# Patient Record
Sex: MALE | Race: WHITE | Employment: OTHER | URBAN - METROPOLITAN AREA
[De-identification: names, ages, dates, MRNs, and addresses within clinical notes are randomized per-mention and may not be internally consistent; named-entity substitution may affect disease eponyms.]

---

## 2017-02-02 NOTE — PROGRESS NOTES
66 y.o. white male who presents for evaluation. He seems to be doing well. Still walking 2 miles no problems about 4-5x/week. Sees cardiology at [facility] yearly now    Reports no GI or  issues. Denies polyuria, polydipsia, nocturia, vision change. FBS in the sub 100 range and no hypo episodes    Past Medical History   Diagnosis Date    Atypical chest pain      2007, 2015    Carotid duplex 01/28/2011     No significant occlusive disease bilaterally.  Colon polyps 2015     Dr Geovani Stewart [facility]    Diabetes mellitus ([address] 1/15     on basis of hba1c    Dyslipidemia     Gilbert's syndrome     Gout 2002    History of echocardiogram 12/23/2013     EF 55-60%. No WMA. BRANDIE. Mild MR. Mild TR. No significant valvular heart disease.  Hypovitaminosis D 2012     Nephrolithiasis      ureteral stone 2004 Dr. Rosalie Cogan, lithotripsy Bettina Gray 2005    Normal nuclear cardiac imaging test 12/23/2013     No evidence of ischemia or prior infarction. EF 63%. No RWMA. Neg EKG on max EST. Ex time 10 min 48 sec.  Osteoarthritis     Peripheral neuropathy Dr. Loretta Dandy, Dr. Rey Greenberg 10/11    Prediabetes     S/P cardiac cath 7/31/14     rca diffuse<20%, distal left main<20%, diffuse 20-30% LAD, no instent restenosis, lvedp 10, ef 55%, minimal anteroapical hypokinesis    S/P cardiac catheterization 07/31/2014     Patent coronary arteries. Prior pLAD stent patent. LVEDP 10 mmHg. EF 55%. Minimal anteroapical hypk.  Venous insufficiency s/p laser vein ablation Dr. Coughlin Her 7/12      left leg     Past Surgical History   Procedure Laterality Date    Hx appendectomy      Hx other surgical       surgery for undescended testicles    Hx lithotripsy  07/05    Hx coronary stent placement       severe 95% LAD disease stented to residual 0%.     Hx gi  1/08     US abd negative    Hx orthopaedic  2007     let meniscus tear Dr. Amara Henry Vascular surgery procedure unlist  2006     negative community screening for PAD/AAA   [employer] Vascular surgery procedure unlist  2008     <50% B ICA    Hx colonoscopy       Dr Moura Older 1/05 negative; Dr Karl Ayoub polyps 7/15    Pr cardiac surg procedure unlist  12/13     thallium negative, ef 63%    Pr cardiac surg procedure unlist  12/13     echo nl lv, ef 60%, mild MR, mild TR     Social History     Social History    Marital status:      Spouse name: N/A    Number of children: 2    Years of education: N/A     Occupational History     Retired     Social History Main Topics    Smoking status: Never Smoker    Smokeless tobacco: Never Used    Alcohol use Yes      Comment: rarely    Drug use: No    Sexual activity: Yes     Partners: Female      Comment: Wife     Other Topics Concern    Not on file     Social History Narrative     Current Outpatient Prescriptions   Medication Sig    metoprolol succinate (TOPROL XL) 25 mg XL tablet Take 1 Tab by mouth daily.  atorvastatin (LIPITOR) 80 mg tablet Take 1 Tab by mouth daily.  metFORMIN ER (GLUCOPHAGE XR) 500 mg tablet Take 1 Tab by mouth daily (with dinner).  clopidogrel (PLAVIX) 75 mg tablet Take 1 Tab by mouth daily.  ezetimibe (ZETIA) 10 mg tablet Take 1 Tab by mouth daily.  glucose blood VI test strips (PRECISION XTRA TEST) strip Use daily as directed    Lancets misc Use daily as directed    Cholecalciferol, Vitamin D3, 5,000 unit Tab Take  by mouth daily.  Comp. Stocking,Thigh,Long,X-Sml Misc 1 Package by Does Not Apply route daily. 30-40 mm/hg    GLUCOSAMINE HCL/CHONDRO PINEDA A (GLUCOSAMINE-CHONDROITIN PO) Take  by mouth daily.  UBIDECARENONE (COENZYME Q10 PO) Take  by mouth daily.  aspirin 81 mg tablet Take 81 mg by mouth daily. No current facility-administered medications for this visit.       LAST MEDICARE WELLNESS EXAM: 3/28/14, 4/10/15, 4/13/16    REVIEW OF SYSTEMS: sees Dr. Eber Tai 2015 Dr Cyndee Baez at [facility], no podiatry  Ophtho - no vision change or eye pain  Oral - no mouth pain, tongue or tooth problems  Ears - no hearing loss, ear pain, fullness  Throat - no swallowing problems  Cardiac - no CP, PND, orthopnea, edema, palpitations or syncope  Chest - no breast masses  Resp - no wheezing, chronic coughing, dyspnea  GI - no heartburn, nausea, vomiting, change in bowel habits, bleeding, hemorrhoids  Urinary - no dysuria, hematuria, flank pain, urgency, frequency  Constitutional - no wt loss, night sweats, unexplained fevers    Visit Vitals    /78    Pulse 60    Temp 97.9 °F (36.6 °C) (Oral)    Ht 5' 11\" (1.803 m)    Wt 163 lb (73.9 kg)    SpO2 100%    BMI 22.73 kg/m2     A&O x3  Affect is appropriate. Mood stable  No apparent distress  Anicteric, no JVD, adenopathy or thyromegaly. No carotid bruits or radiated murmur  Lungs clear to auscultation, no wheezes or rales  Heart showed regular rate and rhythm. No murmur, rubs, gallops  Abdomen soft nontender, no hepatosplenomegaly or masses.    Ext without c/c/e    LABS  From 1/11 showed    gluc 104, cr 0.90,             alt  38,                                   chol 173, tg 82, hdl 49, ldl-c 108, psa 0.90  From 7/11 showed                                 ck 48, javed 7.7  From 2/12 showed    gluc 108                               mma 116,    b12 409, fol 13.7, homo 8.9, nl esr, shannan neg, nl tsh, 2 hr gtt 98   From 4/12 showed        hba1c 6.2,                 chol 155, tg 93,   hdl 68, ldl-c 68  From 10/12 showed  gluc 98,   cr 0.80,             alt 29, hba1c 6.4  From 3/13 showed    gluc 89,   cr 0.80, gfr 91,  alt 14,                   ldl-p 867, chol 160, tg 50,   hdl 75, ldl-c 75,   psa 0.80  From 8/13 showed                                chol 167, tg 57,   hdl 79, ldl-c 77,  wbc 3.7, hb 13.1, plt 154, vit d 53  From 3/14 showed    gluc 103, cr 0.80, gfr 91,  alt 47, hba1c 6.2,               chol 164, tg 74,   hdl 74, ldl-c 72,  wbc 8.6, hb 14.0, plt 186  From 7/14 showed    gluc 89,   cr 0.94, gfr>60,     hba1c 6.4,               chol 157, tg 87,   hdl 74, ldl-c 67,  wbc 4.3, hb 13.4, plt 168, ck/trop neg  From 9/14 showed         hba1c 6.2,               psa 0.76,             ua neg  From 3/15 showed    gluc 105, cr 0.80,      alt 34, hba1c 6.4,    chol 175, tg 76,   hdl 78, ldl-c 82,   wbc 3.9, hb 14.5, plt 176  From 4/15 showed                     wbc 2.4,               plt 95  From 6/15 showed                     wbc 3.8, hb 14.4, plt 123, fe 90, %sat 30, feritin 334, b12 365, fol 14.7   From 9/15 showed         hba1c 6.3,    chol 165, tg 91,   hdl 63, ldl-c 84  From 1/16 showed    gluc 114, cr 0.80, gfr 90,  alt 63, hba1c 6.5,    chol 166, tg 100, hdl 73, ldl-c 73,  wbc 3.9, hb 14.5, plt 165  From 4/16 showed         hba1c 6.5,    chol 164, tg 61,   hdl 75, ldl-c 77,      umar 4.0  From 1/17 showed    gluc 101, cr 0.79, gfr>60, alt 33, hba1c 6.5,     chol 155, tg 70,   hdl 75, ldl-c 66,      umar 6.0    Patient Active Problem List   Diagnosis Code    Hyperlipidemia E78.5    Peripheral neuropathy Dr. Katy De La Fuente, Dr. Olive Dunbar G62.9    Hypovitaminosis D 2012 E55.9    Chest pain, atypical R07.89    Arthritis, degenerative M19.90    Coronary artery disease involving native coronary artery without angina pectoris s/p LAD stent 2007 Dr Cierra Carrillo I25.10    Advance directive discussed with patient Z71.89    Diabetes mellitus type 2, controlled ([facility] [address] E11.9    Colon polyp Dr Shanika Grover 7/15 K63.5    Sinus bradycardia R00.1     Assessment and plan:  1. CHD. F/U Dr. Cierra Carrillo, continue current regimen  2. Nephrolithiasis. Tx prn, hydration  3. Neuropathy. F/U neurology as needed, stable  4. Hypovit D. Off supplements  5. DM. Continue current regimen. 6.  Dyslipidemia. Continue current regimen. RTC 8/17    Above conditions discussed at length and patient vocalized understanding.   All questions answered to patient satifaction

## 2017-02-06 ENCOUNTER — OFFICE VISIT (OUTPATIENT)
Dept: INTERNAL MEDICINE CLINIC | Age: 72
End: 2017-02-06

## 2017-02-06 ENCOUNTER — TELEPHONE (OUTPATIENT)
Dept: INTERNAL MEDICINE CLINIC | Age: 72
End: 2017-02-06

## 2017-02-06 VITALS
BODY MASS INDEX: 22.82 KG/M2 | HEART RATE: 60 BPM | SYSTOLIC BLOOD PRESSURE: 110 MMHG | HEIGHT: 71 IN | TEMPERATURE: 97.9 F | DIASTOLIC BLOOD PRESSURE: 78 MMHG | WEIGHT: 163 LBS | OXYGEN SATURATION: 100 %

## 2017-02-06 DIAGNOSIS — E78.5 HYPERLIPIDEMIA, UNSPECIFIED HYPERLIPIDEMIA TYPE: ICD-10-CM

## 2017-02-06 DIAGNOSIS — Z23 ENCOUNTER FOR IMMUNIZATION: ICD-10-CM

## 2017-02-06 DIAGNOSIS — E11.9 CONTROLLED TYPE 2 DIABETES MELLITUS WITHOUT COMPLICATION, WITHOUT LONG-TERM CURRENT USE OF INSULIN (HCC): Primary | ICD-10-CM

## 2017-02-06 DIAGNOSIS — I25.10 CORONARY ARTERY DISEASE INVOLVING NATIVE CORONARY ARTERY OF NATIVE HEART WITHOUT ANGINA PECTORIS: ICD-10-CM

## 2017-02-06 DIAGNOSIS — G62.9 PERIPHERAL POLYNEUROPATHY: ICD-10-CM

## 2017-02-06 NOTE — PROGRESS NOTES
Front staff - pls sched rpe with labs in Aug - print out lab slip    Jessie Ramirez -pls get records dr Madyson Anderson

## 2017-02-06 NOTE — PROGRESS NOTES
1. Have you been to the ER, urgent care clinic or hospitalized since your last visit? NO.     2. Have you seen or consulted any other health care providers outside of the [address] [place] [address] since your last visit (Include any pap smears or colon screening)? NO      Do you have an Advanced Directive? Yes    Would you like information on Advanced Directives?  NO

## 2017-02-06 NOTE — MR AVS SNAPSHOT
Visit Information Date & Time Provider Department Dept. Phone Encounter #  
 2/6/2017  9:30 AM Bhavin Herman MD Internist of [address] [place] [address] [phone] Your Appointments 4/4/2017  8:00 AM  
Follow Up with Sonu Uribe MD  
[facility] ([address]) Appt Note: 1 year with an ekg [place] [address] [address] [address] [phone] [address]  
  
    
 8/23/2017 10:30 AM  
PHYSICAL with Bhavin Herman MD  
Internist of [address]) Appt Note: rpe rd  
 [address] [facility], [address] [address] [address]  
  
   
 [address], [address] Upcoming Health Maintenance Date Due DTaP/Tdap/Td series (1 - Tdap) 1/2/2005 EYE EXAM RETINAL OR DILATED Q1 1/13/2012 Pneumococcal 65+ Low/Medium Risk (2 of 2 - PPSV23) 10/1/2016 FOOT EXAM Q1 1/18/2017 HEMOGLOBIN A1C Q6M 2/15/2017 LIPID PANEL Q1 4/5/2017 MICROALBUMIN Q1 4/11/2017 FOBT Q 1 YEAR AGE 50-75 4/11/2017 MEDICARE YEARLY EXAM 4/14/2017 GLAUCOMA SCREENING Q2Y 4/11/2018 Allergies as of 2/6/2017  Review Complete On: 8/22/2016 By: Bhavin Herman MD  
 No Known Allergies Current Immunizations  Reviewed on 4/14/2016 Name Date Influenza Vaccine 10/1/2015, 10/1/2014, 11/1/2013, 10/1/2012 Influenza Vaccine Whole 9/1/2010 Pneumococcal Conjugate (PCV-13) 10/1/2015 Pneumococcal Polysaccharide (PPSV-23)  Incomplete Pneumococcal Vaccine (Unspecified Type) 1/23/2008 TD Vaccine 1/1/2005 Zoster 1/1/2008 Not reviewed this visit You Were Diagnosed With   
  
 Codes Comments Encounter for immunization     ICD-10-CM: I63 ICD-9-CM: V03.89 Vitals BP Pulse Temp Height(growth percentile) Weight(growth percentile) SpO2 110/78 60 97.9 °F (36.6 °C) (Oral) 5' 11\" (1.803 m) 163 lb (73.9 kg) 100% BMI Smoking Status 22.73 kg/m2 Never Smoker Vitals History BMI and BSA Data Body Mass Index Body Surface Area  
 22.73 kg/m 2 1.92 m 2 Preferred Pharmacy Pharmacy Name Phone [facility] [phone] Your Updated Medication List  
  
   
This list is accurate as of: 2/6/17 10:33 AM.  Always use your most recent med list.  
  
  
  
  
 aspirin 81 mg tablet Take 81 mg by mouth daily. atorvastatin 80 mg tablet Commonly known as:  LIPITOR Take 1 Tab by mouth daily. cholecalciferol (VITAMIN D3) 5,000 unit Tab tablet Commonly known as:  VITAMIN D3 Take  by mouth daily. clopidogrel 75 mg Tab Commonly known as:  PLAVIX Take 1 Tab by mouth daily. COENZYME Q10 PO Take  by mouth daily. Comp. Stocking,Thigh,Long,X-Sml Misc  
1 Package by Does Not Apply route daily. 30-40 mm/hg  
  
 ezetimibe 10 mg tablet Commonly known as:  Valdez Duane Take 1 Tab by mouth daily. GLUCOSAMINE-CHONDROITIN PO Take  by mouth daily. glucose blood VI test strips strip Commonly known as:  PRECISION XTRA TEST Use daily as directed Lancets Misc Use daily as directed  
  
 metFORMIN  mg tablet Commonly known as:  GLUCOPHAGE XR Take 1 Tab by mouth daily (with dinner). metoprolol succinate 25 mg XL tablet Commonly known as:  TOPROL XL Take 1 Tab by mouth daily. We Performed the Following ADMIN PNEUMOCOCCAL VACCINE [ [facility]] PNEUMOCOCCAL POLYSACCHARIDE VACCINE, 23-VALENT, ADULT OR IMMUNOSUPPRESSED PT DOSE, [25392 CPT(R)] Introducing [facility]! Dear Lucía Amin: Thank you for requesting a Watchsend account. Our records indicate that you already have an active Watchsend account. You can access your account anytime at [email or web address] Did you know that you can access your hospital and ER discharge instructions at any time in [employer]fe[employer]? You can also review all of your test results from your hospital stay or ER visit. Additional Information If you have questions, please visit the Frequently Asked Questions section of the Pro[employer] website at [email or web address] Remember, Profex is NOT to be used for urgent needs. For medical emergencies, dial 911. Now available from your iPhone and Android! Please provide this summary of care documentation to your next provider. Your primary care clinician is listed as Mya La. If you have any questions after today's visit, please call [phone].

## 2017-05-22 DIAGNOSIS — I65.23 CAROTID STENOSIS, BILATERAL: ICD-10-CM

## 2017-05-22 RX ORDER — CLOPIDOGREL BISULFATE 75 MG/1
75 TABLET ORAL DAILY
Qty: 90 TAB | Refills: 3 | Status: SHIPPED | OUTPATIENT
Start: 2017-05-22 | End: 2018-04-24 | Stop reason: SDUPTHER

## 2017-05-22 RX ORDER — EZETIMIBE 10 MG/1
10 TABLET ORAL DAILY
Qty: 90 TAB | Refills: 3 | Status: SHIPPED | OUTPATIENT
Start: 2017-05-22 | End: 2018-04-24 | Stop reason: SDUPTHER

## 2017-05-23 ENCOUNTER — OFFICE VISIT (OUTPATIENT)
Dept: CARDIOLOGY CLINIC | Age: 72
End: 2017-05-23

## 2017-05-23 VITALS
HEART RATE: 54 BPM | BODY MASS INDEX: 23.24 KG/M2 | WEIGHT: 166 LBS | DIASTOLIC BLOOD PRESSURE: 64 MMHG | HEIGHT: 71 IN | SYSTOLIC BLOOD PRESSURE: 106 MMHG | OXYGEN SATURATION: 98 %

## 2017-05-23 DIAGNOSIS — I25.10 CORONARY ARTERY DISEASE INVOLVING NATIVE CORONARY ARTERY OF NATIVE HEART WITHOUT ANGINA PECTORIS: Primary | ICD-10-CM

## 2017-05-23 DIAGNOSIS — E11.9 CONTROLLED TYPE 2 DIABETES MELLITUS WITHOUT COMPLICATION, WITHOUT LONG-TERM CURRENT USE OF INSULIN (HCC): ICD-10-CM

## 2017-05-23 DIAGNOSIS — E78.5 HYPERLIPIDEMIA, UNSPECIFIED HYPERLIPIDEMIA TYPE: ICD-10-CM

## 2017-05-23 DIAGNOSIS — R00.1 SINUS BRADYCARDIA: ICD-10-CM

## 2017-05-23 NOTE — PROGRESS NOTES
1. Have you been to the ER, urgent care clinic since your last visit? Hospitalized since your last visit? No     2. Have you seen or consulted any other health care providers outside of the [address] [place] [address] since your last visit? Include any pap smears or colon screening.  No

## 2017-05-23 NOTE — PROGRESS NOTES
HISTORY OF PRESENT ILLNESS  Mihir Hand is a 67 y.o. male. ASSESSMENT and PLAN    Mr. Leanne Waggoner has history of CAD. He has never had chest pains or angina equivalent. Back in 4670, he had 64 slice CT scan for unclear reasons. This reveals significant calcification. He subsequently underwent evaluation for CAD despite the fact that he had no symptoms. His evaluation revealed severe LAD disease. He subtotally had LAD stent performed in 2007 and has done fairly well since that time. He remains active physically. Because of recurrent episodes of chest pain, he was read[facility] to [facility]. This was in July of 2014 and he subsequent underwent repeat coronary angiography which revealed patent LAD stent, without significant, occlusive CAD. Reassurance was given. He presented to the hospital in [place] with abdominal discomfort. He was concerned about possible coronary syndrome. This was in early part of May 2017. He ruled out. He had a stress echocardiogram which was reported to him as normal.     CAD:   Remains clinically stable. He has not had any symptoms of chest pain or increased shortness of breath.  BP:   Well-controlled.  HR:    He has asymptomatic sinus bradycardia on low-dose Toprol-XL, 25 mg daily. If his heart rate slows further, or he becomes symptomatic, I would discontinue beta-blocker.  CHF:   Currently, there is no evidence of decompensated CHF noted.  Weight:   His weight today is 166 pounds. This is near his baseline.  Cholesterol:   Target LDL <70. He remains on Lipitor 80 mg daily and Zetia 10 mg daily.  Anti-platelet:   Remains on ASA, and Plavix. I would continue his current medication regimen. I would not pursue further coronary risk stratification at this time with his recent unremarkable stress echocardiogram.  I will plan on seeing him back annually. Thank you. Encounter Diagnoses   Name Primary?     Coronary artery disease involving native coronary artery of native heart without angina pectoris Yes    Sinus bradycardia     Hyperlipidemia, unspecified hyperlipidemia type     Controlled type 2 diabetes mellitus without complication, without long-term current use of insulin (HCC)      current treatment plan is effective, no change in therapy  lab results and schedule of future lab studies reviewed with patient  reviewed diet, exercise and weight control  cardiovascular risk and specific lipid/LDL goals reviewed  use of aspirin to prevent MI and TIA's discussed      HPI   Today, Mr. Candy Pineda has no complaints of chest pains, increased shortness of breath or decreased exercise capacity. However, last Saturday, he was in [place] where he experienced some chest discomfort as well as abdominal discomfort. He was ruled out at the hospital done in [place] and subsequently underwent stress echocardiogram.  This was reported to him as unremarkable. That information is being requested. Review of Systems   Respiratory: Negative for shortness of breath. Cardiovascular: Positive for chest pain. Negative for palpitations, orthopnea, claudication, leg swelling and PND. All other systems reviewed and are negative. Physical Exam   Constitutional: He is oriented to person, place, and time. He appears well-developed and well-nourished. HENT:   Head: Normocephalic. Eyes: Conjunctivae are normal.   Neck: Neck supple. No JVD present. Carotid bruit is not present. No thyromegaly present. Cardiovascular: Regular rhythm. Bradycardia present. Pulmonary/Chest: Breath sounds normal.   Abdominal: Bowel sounds are normal.   Musculoskeletal: He exhibits no edema. Neurological: He is alert and oriented to person, place, and time. Skin: Skin is warm and dry. Nursing note and vitals reviewed.       PCP: Karin Alpers, MD    Past Medical History:   Diagnosis Date    Atypical chest pain     2007, 2015    Cardiac catheterization 07/31/2014    LAD diffuse 20-30%. Prior pLAD stent patent. Otherwise < 20% coronary artery disease. LVEDP 10 mmHg. EF 55%. Minimal anteroapical hypk.  Cardiac echocardiogram 12/23/2013    EF 55-60%. No WMA. BRANDIE. Mild MR. Mild TR. No significant valvular heart disease.  Cardiac nuclear imaging test 12/23/2013    No evidence of ischemia or prior infarction. EF 63%. No RWMA. Neg EKG on max EST. Ex time 10 min 48 sec.  Carotid duplex 04/08/2016    Mild <50% bilateral ICA stenosis.  Colon polyps 2015    Dr Yaniv Mccray [facility]    Diabetes mellitus ([facility] [address] 1/15    on basis of hba1c    Dyslipidemia     Gilbert's syndrome     Gout 2002    Hypovitaminosis D 2012     Nephrolithiasis     ureteral stone 2004 Dr. Yvan Sheehan, lithotripsy Dalton Denis 2005    Osteoarthritis     Peripheral neuropathy Dr. Lele Villalobos, Dr. Micaela Fisher 10/11    Prediabetes     Venous insufficiency s/p laser vein ablation Dr. Archana Castro 7/12     left leg       Past Surgical History:   Procedure Laterality Date   [address]  12/13    thallium negative, ef 63%    CARDIAC SURG PROCEDURE UNLIST  12/13    echo nl lv, ef 60%, mild MR, mild TR    HX APPENDECTOMY      HX COLONOSCOPY      Dr Que Wolf 1/05 negative; Dr Jen Knutson polyps 7/15    HX CORONARY STENT PLACEMENT      severe 95% LAD disease stented to residual 0%.  HX GI  1/08    US abd negative    HX LITHOTRIPSY  07/05    HX ORTHOPAEDIC  2007    let meniscus tear Dr. Landry Peaks [address]      surgery for undescended testicles    VASCULAR SURGERY PROCEDURE UNLIST  2006    negative community screening for PAD/AAA    VASCULAR SURGERY PROCEDURE UNLIST  2008    <50% B ICA       Current Outpatient Prescriptions   Medication Sig Dispense Refill    ezetimibe (ZETIA) 10 mg tablet Take 1 Tab by mouth daily. 90 Tab 3    clopidogrel (PLAVIX) 75 mg tab Take 1 Tab by mouth daily. 90 Tab 3    metoprolol succinate (TOPROL XL) 25 mg XL tablet Take 1 Tab by mouth daily.  80 Tab 3    atorvastatin (LIPITOR) 80 mg tablet Take 1 Tab by mouth daily. 90 Tab 3    metFORMIN ER (GLUCOPHAGE XR) 500 mg tablet Take 1 Tab by mouth daily (with dinner). 90 Tab 3    glucose blood VI test strips (PRECISION XTRA TEST) strip Use daily as directed 100 Strip 11    Lancets misc Use daily as directed 1 Each 11    Cholecalciferol, Vitamin D3, 5,000 unit Tab Take  by mouth daily.  Comp. Stocking,Thigh,Long,X-Sml Misc 1 Package by Does Not Apply route daily. 30-40 mm/hg 1 Package 1    GLUCOSAMINE HCL/CHONDRO PINEDA A (GLUCOSAMINE-CHONDROITIN PO) Take  by mouth daily.  UBIDECARENONE (COENZYME Q10 PO) Take  by mouth daily.  aspirin 81 mg tablet Take 81 mg by mouth daily. The patient has a family history of    Social History   Substance Use Topics    Smoking status: Never Smoker    Smokeless tobacco: Never Used    Alcohol use Yes      Comment: rarely       Lab Results   Component Value Date/Time    Cholesterol, total 157 07/30/2014 04:14 AM    HDL Cholesterol 74 07/30/2014 04:14 AM    LDL, calculated 65.6 07/30/2014 04:14 AM    Triglyceride 87 07/30/2014 04:14 AM    CHOL/HDL Ratio 2.1 07/30/2014 04:14 AM        BP Readings from Last 3 Encounters:   05/23/17 106/64   02/06/17 110/78   08/22/16 98/60        Pulse Readings from Last 3 Encounters:   05/23/17 (!) 54   02/06/17 60   08/22/16 61       Wt Readings from Last 3 Encounters:   05/23/17 75.3 kg (166 lb)   02/06/17 73.9 kg (163 lb)   08/22/16 69.9 kg (154 lb)         EKG: unchanged from previous tracings, sinus bradycardia.

## 2017-05-23 NOTE — MR AVS SNAPSHOT
Visit Information Date & Time Provider Department Dept. Phone Encounter #  
 5/23/2017  8:40 AM Dilma Guajardo MD [facility] [id number] Your Appointments 8/23/2017 10:30 AM  
PHYSICAL with Rika Rice MD  
Internist of [facility]) Appt Note: [address]  
 [address], [address] [place] [address]  
  
   
 [address], [address] Upcoming Health Maintenance Date Due  
 FOOT EXAM Q1 1/18/2017 FOBT Q 1 YEAR AGE 50-75 4/11/2017 MEDICARE YEARLY EXAM 4/14/2017 HEMOGLOBIN A1C Q6M 7/27/2017 INFLUENZA AGE 9 TO ADULT 8/1/2017 MICROALBUMIN Q1 1/27/2018 LIPID PANEL Q1 1/27/2018 EYE EXAM RETINAL OR DILATED Q1 1/31/2018 GLAUCOMA SCREENING Q2Y 1/31/2019 DTaP/Tdap/Td series (2 - Td) 2/6/2027 Allergies as of 5/23/2017  Review Complete On: 2/6/2017 By: Rika Rice MD  
 No Known Allergies Current Immunizations  Reviewed on 4/14/2016 Name Date Influenza Vaccine 10/1/2015, 10/1/2014, 11/1/2013, 10/1/2012 Influenza Vaccine Whole 9/1/2010 Pneumococcal Conjugate (PCV-13) 10/1/2015 Pneumococcal Polysaccharide (PPSV-23) 2/6/2017 Pneumococcal Vaccine (Unspecified Type) 1/23/2008 TD Vaccine 1/1/2005 Zoster 1/1/2008 Not reviewed this visit You Were Diagnosed With   
  
 Codes Comments Sinus bradycardia    -  Primary ICD-10-CM: R00.1 ICD-9-CM: 427.89 Coronary artery disease involving native coronary artery of native heart without angina pectoris     ICD-10-CM: I25.10 ICD-9-CM: 414.01 Hyperlipidemia, unspecified hyperlipidemia type     ICD-10-CM: E78.5 ICD-9-CM: 272.4 Controlled type 2 diabetes mellitus without complication, without long-term current use of insulin ([facility]     ICD-10-CM: E11.9 ICD-9-CM: 250.00 Vitals BP Pulse Height(growth percentile) Weight(growth percentile) SpO2 BMI 106/64 (!) 54 5' 11\" (1.803 m) 166 lb (75.3 kg) 98% 23.15 kg/m2 Smoking Status Never Smoker Vitals History BMI and BSA Data Body Mass Index Body Surface Area  
 23.15 kg/m 2 1.94 m 2 Preferred Pharmacy Pharmacy Name Phone [facility] [phone] Your Updated Medication List  
  
   
This list is accurate as of: 5/23/17  9:16 AM.  Always use your most recent med list.  
  
  
  
  
 aspirin 81 mg tablet Take 81 mg by mouth daily. atorvastatin 80 mg tablet Commonly known as:  LIPITOR Take 1 Tab by mouth daily. cholecalciferol (VITAMIN D3) 5,000 unit Tab tablet Commonly known as:  VITAMIN D3 Take  by mouth daily. clopidogrel 75 mg Tab Commonly known as:  PLAVIX Take 1 Tab by mouth daily. COENZYME Q10 PO Take  by mouth daily. Comp. Stocking,Thigh,Long,X-Sml Misc  
1 Package by Does Not Apply route daily. 30-40 mm/hg  
  
 ezetimibe 10 mg tablet Commonly known as:  [place]acheco Take 1 Tab by mouth daily. GLUCOSAMINE-CHONDROITIN PO Take  by mouth daily. glucose blood VI test strips strip Commonly known as:  PRECISION XTRA TEST Use daily as directed Lancets Misc Use daily as directed  
  
 metFORMIN  mg tablet Commonly known as:  GLUCOPHAGE XR Take 1 Tab by mouth daily (with dinner). metoprolol succinate 25 mg XL tablet Commonly known as:  TOPROL XL Take 1 Tab by mouth daily. We Performed the Following AMB POC EKG ROUTINE W/ 12 LEADS, INTER & REP [35665 CPT(R)] Introducing [facility]! Dear Francisco Ibarra: Thank you for requesting a [employer] account. Our records indicate that you already have an active [employer] account. You can access your account anytime at [email or web address] Did you know that you can access your hospital and ER discharge instructions at any time in NetEffect? You can also review all of your test results from your hospital stay or ER visit. Additional Information If you have questions, please visit the Frequently Asked Questions section of the NetEffect website at [email or web address] Remember, NetEffect is NOT to be used for urgent needs. For medical emergencies, dial 911. Now available from your iPhone and Android! Please provide this summary of care documentation to your next provider. Your primary care clinician is listed as Judy Aleman. If you have any questions after today's visit, please call [phone].

## 2017-07-07 PROBLEM — E11.9 CONTROLLED TYPE 2 DIABETES MELLITUS WITHOUT COMPLICATION, WITHOUT LONG-TERM CURRENT USE OF INSULIN (HCC): Status: ACTIVE | Noted: 2017-07-07

## 2017-08-14 RX ORDER — METFORMIN HYDROCHLORIDE 500 MG/1
500 TABLET, EXTENDED RELEASE ORAL
Qty: 90 TAB | Refills: 3 | Status: SHIPPED | OUTPATIENT
Start: 2017-08-14 | End: 2018-09-07 | Stop reason: SDUPTHER

## 2017-09-12 ENCOUNTER — OFFICE VISIT (OUTPATIENT)
Dept: CARDIOLOGY CLINIC | Age: 72
End: 2017-09-12

## 2017-09-12 VITALS
OXYGEN SATURATION: 98 % | WEIGHT: 169 LBS | BODY MASS INDEX: 23.66 KG/M2 | DIASTOLIC BLOOD PRESSURE: 74 MMHG | HEIGHT: 71 IN | SYSTOLIC BLOOD PRESSURE: 122 MMHG | HEART RATE: 71 BPM

## 2017-09-12 DIAGNOSIS — R07.89 CHEST DISCOMFORT: ICD-10-CM

## 2017-09-12 DIAGNOSIS — R10.84 ABDOMINAL PAIN, GENERALIZED: ICD-10-CM

## 2017-09-12 DIAGNOSIS — R07.9 CHEST PAIN, UNSPECIFIED TYPE: ICD-10-CM

## 2017-09-12 DIAGNOSIS — I25.10 CORONARY ARTERY DISEASE INVOLVING NATIVE CORONARY ARTERY OF NATIVE HEART WITHOUT ANGINA PECTORIS: Primary | ICD-10-CM

## 2017-09-12 NOTE — PROGRESS NOTES
1. Have you been to the ER, urgent care clinic since your last visit? Hospitalized since your last visit? Yes, 9/8/17 for chest pain     2. Have you seen or consulted any other health care providers outside of the [address] [place] [address] since your last visit? Include any pap smears or colon screening.   No

## 2017-09-12 NOTE — PROGRESS NOTES
HISTORY OF PRESENT ILLNESS  Maria Isabel Ferreira is a 67 y.o. male. ASSESSMENT and PLAN    Mr. Tyler Philip has history of CAD. He has never had chest pains or angina equivalent. Back in 0483, he had 64 slice CT scan for unclear reasons. This revealed significant calcification. He subsequently underwent evaluation for CAD despite the fact that he had no symptoms. His evaluation revealed severe LAD disease. He subsequently had LAD stent performed in 2007 and has done fairly well since that time. He remains active physically. Because of recurrent episodes of chest pain, he was [facility]. This was in July of 2014 and he subsequent underwent repeat coronary angiography which revealed patent LAD stent, without significant, occlusive CAD. Reassurance was given. He presented to the hospital in [place] with abdominal discomfort. He was concerned about possible coronary syndrome. This was in early part of May 2017. He ruled out. He had a stress echocardiogram which was reported to him as normal.  In September 2017, he presented to [facility] emergency room with atypical chest pains. They ruled out acute coronary event and subsequently discharge the patient home. He has had multiple presentations to the emergency room for atypical chest pains over the last 2 years.  CAD:   Symptomatically stable. His atypical chest pains at rest is unlikely from his CAD. However, for completeness, prior to seeking other etiologies, I have requested exercise nuclear scan. If the exercise nuclear scan is unremarkable, I would pursue other etiologies; I will defer this to his PCP.  Abdominal pain: Abdominal and thoracic CT scan will be performed.  BP:   Well-controlled.  HR:    Stable.  CHF:   There is no evidence of decompensated CHF noted.  Weight:   His weight is 169 pounds. It is at baseline.  Cholesterol:   Target LDL <70.   He continues on Lipitor 80 and Zetia 10 daily.   Anti-platelet:   Remains on ASA, and Plavix. Because of his multiple presentations, lengthy discussion was carried on about his cardiac status and his atypical chest pains. All questions were answered. Greater than 25 minutes spent on discussion alone. Obviously, reassurance was given. If the above nuclear scan and CT scan are unremarkable, I will plan on seeing him back annually. Thank you. Encounter Diagnoses   Name Primary?  Coronary artery disease involving native coronary artery of native heart without angina pectoris Yes    Chest discomfort     Chest pain, unspecified type     Abdominal pain, generalized      current treatment plan is effective, no change in therapy  lab results and schedule of future lab studies reviewed with patient  reviewed diet, exercise and weight control  cardiovascular risk and specific lipid/LDL goals reviewed  use of aspirin to prevent MI and TIA's discussed      HPI  Today, Mr. Belkis Palacios has no complaints of exertional chest pains. Over the last 2-3 years, he has had episodes of rest chest discomfort for which she has gone to at least 4 different emergency rooms. Each of the presentations, he was told that he did not have ACS and was subsequently discharged home. He denies any exertional chest pains, increased shortness of breath, or decreased exercise capacity. He only has chest pains at rest without significant regularity. He denies any orthopnea or PND. He denies any dizziness. Review of Systems   Respiratory: Negative for shortness of breath. Cardiovascular: Positive for chest pain and palpitations. Negative for orthopnea, claudication, leg swelling and PND. All other systems reviewed and are negative. Physical Exam   Constitutional: He is oriented to person, place, and time. He appears well-developed and well-nourished. HENT:   Head: Normocephalic. Eyes: Conjunctivae are normal.   Neck: Neck supple. No JVD present.  Carotid bruit is not present. No thyromegaly present. Cardiovascular: Normal rate and regular rhythm. Pulmonary/Chest: Breath sounds normal.   Abdominal: Bowel sounds are normal.   Musculoskeletal: He exhibits no edema. Neurological: He is alert and oriented to person, place, and time. Skin: Skin is warm and dry. Nursing note and vitals reviewed. PCP: Josy Bardales MD    Past Medical History:   Diagnosis Date    Atypical chest pain     2007, 2015    Cardiac catheterization 07/31/2014    LAD diffuse 20-30%. Prior pLAD stent patent. Otherwise < 20% coronary artery disease. LVEDP 10 mmHg. EF 55%. Minimal anteroapical hypk.  Cardiac echocardiogram 12/23/2013    EF 55-60%. No WMA. BRANDIE. Mild MR. Mild TR. No significant valvular heart disease.  Cardiac nuclear imaging test 12/23/2013    No evidence of ischemia or prior infarction. EF 63%. No RWMA. Neg EKG on max EST. Ex time 10 min 48 sec.  Carotid duplex 04/08/2016    Mild <50% bilateral ICA stenosis.  Colon polyps 2015    Dr Vance Hill [facility]    Diabetes mellitus ([facility] 1/15    on basis of hba1c    Dyslipidemia     Gilbert's syndrome     Gout 2002    Hypovitaminosis D 2012     Nephrolithiasis     ureteral stone 2004 Dr. Deborah Wilkerson, lithotripsy [facility] 2005    Osteoarthritis     Peripheral neuropathy Dr. Jermaine Romero, Dr. Thompson Camacho 10/11    Prediabetes     Venous insufficiency s/p laser vein ablation Dr. Rosemarie Anderson 7/12     left leg       Past Surgical History:   Procedure Laterality Date   [address]  12/13    thallium negative, ef 63%    CARDIAC SURG PROCEDURE UNLIST  12/13    echo nl lv, ef 60%, mild MR, mild TR    HX APPENDECTOMY      HX COLONOSCOPY      Dr Nimisha Samuel 1/05 negative; Dr Mercer Angle polyps 7/15    HX CORONARY STENT PLACEMENT      severe 95% LAD disease stented to residual 0%.     HX GI  1/08    US abd negative    HX LITHOTRIPSY  07/05    HX ORTHOPAEDIC  2007    let meniscus tear Dr. Salo Ritchie HX OTHER SURGICAL      surgery for undescended testicles    VASCULAR SURGERY PROCEDURE UNLIST  2006    negative community screening for PAD/AAA    VASCULAR SURGERY PROCEDURE UNLIST  2008    <50% B ICA       Current Outpatient Prescriptions   Medication Sig Dispense Refill    metFORMIN ER (GLUCOPHAGE XR) 500 mg tablet Take 1 Tab by mouth daily (with dinner). 90 Tab 3    glucose blood VI test strips (PRECISION XTRA TEST) strip Pt to test blood sugar once daily. Dx: E11.9 100 Strip 3    ezetimibe (ZETIA) 10 mg tablet Take 1 Tab by mouth daily. 90 Tab 3    clopidogrel (PLAVIX) 75 mg tab Take 1 Tab by mouth daily. 90 Tab 3    metoprolol succinate (TOPROL XL) 25 mg XL tablet Take 1 Tab by mouth daily. 90 Tab 3    atorvastatin (LIPITOR) 80 mg tablet Take 1 Tab by mouth daily. 90 Tab 3    Lancets misc Use daily as directed 1 Each 11    Cholecalciferol, Vitamin D3, 5,000 unit Tab Take  by mouth daily.  Comp. Stocking,Thigh,Long,X-Sml Misc 1 Package by Does Not Apply route daily. 30-40 mm/hg 1 Package 1    GLUCOSAMINE HCL/CHONDRO PINEDA A (GLUCOSAMINE-CHONDROITIN PO) Take  by mouth daily.  UBIDECARENONE (COENZYME Q10 PO) Take  by mouth daily.  aspirin 81 mg tablet Take 81 mg by mouth daily.          The patient has a family history of    Social History   Substance Use Topics    Smoking status: Never Smoker    Smokeless tobacco: Never Used    Alcohol use Yes      Comment: rarely       Lab Results   Component Value Date/Time    Cholesterol, total 157 07/30/2014 04:14 AM    HDL Cholesterol 74 07/30/2014 04:14 AM    LDL, calculated 65.6 07/30/2014 04:14 AM    Triglyceride 87 07/30/2014 04:14 AM    CHOL/HDL Ratio 2.1 07/30/2014 04:14 AM        BP Readings from Last 3 Encounters:   09/12/17 122/74   05/23/17 106/64   02/06/17 110/78        Pulse Readings from Last 3 Encounters:   09/12/17 71   05/23/17 (!) 54   02/06/17 60       Wt Readings from Last 3 Encounters:   09/12/17 76.7 kg (169 lb)   05/23/17 75.3 kg (166 lb)   02/06/17 73.9 kg (163 lb)         EKG: No ECG done today.

## 2017-09-25 ENCOUNTER — HOSPITAL ENCOUNTER (OUTPATIENT)
Dept: NON INVASIVE DIAGNOSTICS | Age: 72
Discharge: HOME | End: 2017-09-25
Attending: INTERNAL MEDICINE
Payer: MEDICARE

## 2017-09-25 DIAGNOSIS — I25.10 CORONARY ARTERY DISEASE INVOLVING NATIVE CORONARY ARTERY OF NATIVE HEART WITHOUT ANGINA PECTORIS: ICD-10-CM

## 2017-09-25 PROCEDURE — 78452 HT MUSCLE IMAGE SPECT MULT: CPT | Performed by: INTERNAL MEDICINE

## 2017-09-25 PROCEDURE — A9500 TC99M SESTAMIBI: HCPCS

## 2017-09-25 PROCEDURE — 93017 CV STRESS TEST TRACING ONLY: CPT | Performed by: INTERNAL MEDICINE

## 2017-09-25 PROCEDURE — 74011000258 HC RX REV CODE- 258: Performed by: INTERNAL MEDICINE

## 2017-09-25 RX ORDER — SODIUM CHLORIDE 9 MG/ML
100 INJECTION, SOLUTION INTRAVENOUS ONCE
Status: COMPLETED | OUTPATIENT
Start: 2017-09-25 | End: 2017-09-25

## 2017-09-25 RX ADMIN — SODIUM CHLORIDE 100 ML/HR: 900 INJECTION, SOLUTION INTRAVENOUS at 08:50

## 2017-09-25 NOTE — PROGRESS NOTES
Patient was given 11.0 milliCuries of 99mTc-Sestamibi for the resting images. Patient was also given 33.0 milliCuries of 99mTc-Sestamibi for the stress images. Patient walked on treadmill during nuclear stress test.  Patient's armband was discarded and shredded.

## 2017-09-26 LAB
ATTENDING PHYSICIAN, CST07: NORMAL
DIAGNOSIS, 93000: NORMAL
DUKE TM SCORE RESULT, CST14: NORMAL
DUKE TREADMILL SCORE, CST13: NORMAL
ECG INTERP BEFORE EX, CST11: NORMAL
ECG INTERP DURING EX, CST12: NORMAL
FUNCTIONAL CAPACITY, CST17: NORMAL
KNOWN CARDIAC CONDITION, CST08: NORMAL
MAX. DIASTOLIC BP, CST04: 60 MMHG
MAX. HEART RATE, CST05: 131 BPM
MAX. SYSTOLIC BP, CST03: 130 MMHG
OVERALL BP RESPONSE TO EXERCISE, CST16: NORMAL
OVERALL HR RESPONSE TO EXERCISE, CST15: NORMAL
PEAK EX METS, CST10: 13.4 METS
PROTOCOL NAME, CST01: NORMAL
TEST INDICATION, CST09: NORMAL

## 2017-09-26 NOTE — PROCEDURES
[address] STRESS    Name:  Gabby Smith  MR#:  [id number]  :  1945  Account #:  [de-identified]  Date of Adm:  2017  Date of Service:  2017      NUCLEAR MYOCARDIAL PERFUSION STUDY    ORDERING PHYSICIAN: Jorje Gilman MD    PRIMARY CARE PHYSICIAN: Josué Morales MD    REASON FOR STUDY: Chronic coronary artery disease. EKG RESTING: Demonstrated sinus bradycardia with rate and some  premature atrial complexes, as well as an RSR-prime normal variant in  V1 and V2. PROCEDURE: The patient received 11 mCi of technetium-99m  sestamibi intravenously via a left arm IV and had a resting myocardial  perfusion study completed. He subsequently underwent stress testing  using the standard Emir protocol. Total exercise time was 10 minutes  and 52 seconds. Achieved heart rate was 131, 89% of predicted  maximum. REASON FOR TERMINATION: Fatigue and shortness of breath. ABNORMALITY SYMPTOMS: No chest pain. ST CHANGES: 1 mm of upsloping ST depression inferiorly, which was  felt to be within normal limits. BLOOD PRESSURE RESPONSE: Normal.    ARRHYTHMIAS: Occasional PVCs. STRESS TEST FINDINGS: Negative maximal stress test. The patient  received 33 mCi of technetium-99m sestamibi intravenously and had a  stress myocardial perfusion study completed and compared to the  resting study. FINDINGS: There appeared to be normal perfusion throughout the left  ventricular myocardium without signs of ischemia or infarction. There  was a transient ischemic dilatation index at 1.09, which is borderline  positive for possible 3-vessel disease in a stress nuclear myocardial  perfusion study. Gated SPECT imaging demonstrated normal left  ventricular volumes with some very mild inferoseptal hypokinesia and  otherwise normal wall motion and normal overall left ventricular  function, with an ejection fraction of 59%.     SUMMARY  1. Mildly abnormal but presumably remaining low risk Lexiscan  Cardiolite myocardial perfusion study. 2. Normal perfusion throughout the left ventricular myocardium without  signs of ischemia or infarction. 3. There was a transient ischemic dilatation index at 1.09, which is  borderline positive for transient ischemic dilatation on a stress  myocardial perfusion study to suggest the possibility of 3-vessel  coronary artery disease. 4. Normal left ventricular volumes with normal wall motion except for  some very mild distal inferoseptal hypokinesia and normal overall  ejection fraction of 59%. Compared to the study of December 23, 2013, the patient exercised over a similar period of time to a slightly  higher heart rate with a completely normal-appearing study and  ejection fraction of 63% on gated SPECT imaging at that time. The  current study also appeared to be presumably within normal limits with  an ejection fraction of 59%, but the transient ischemic dilatation index  at 1.09 appears to be new on this study and, although borderline, could  suggest the possibility of 3-vessel disease if clinically suspected and   would then make this an intermediate risk study.         DO DEONTE Jarvis MD  D:  09/25/2017   18:55  T:  09/26/2017   03:11  Job #:  [id number]

## 2017-09-27 ENCOUNTER — HOSPITAL ENCOUNTER (OUTPATIENT)
Dept: CT IMAGING | Age: 72
Discharge: HOME | End: 2017-09-27
Attending: INTERNAL MEDICINE
Payer: MEDICARE

## 2017-09-27 DIAGNOSIS — R07.9 CHEST PAIN, UNSPECIFIED TYPE: ICD-10-CM

## 2017-09-27 DIAGNOSIS — R10.84 ABDOMINAL PAIN, GENERALIZED: ICD-10-CM

## 2017-09-27 DIAGNOSIS — I25.10 CORONARY ARTERY DISEASE INVOLVING NATIVE CORONARY ARTERY OF NATIVE HEART WITHOUT ANGINA PECTORIS: ICD-10-CM

## 2017-09-27 DIAGNOSIS — R07.89 CHEST DISCOMFORT: ICD-10-CM

## 2017-09-27 PROCEDURE — 74011636320 HC RX REV CODE- 636/320: Performed by: INTERNAL MEDICINE

## 2017-09-27 PROCEDURE — 74175 CTA ABDOMEN W/CONTRAST: CPT

## 2017-09-27 RX ADMIN — IOPAMIDOL 100 ML: 612 INJECTION, SOLUTION INTRAVENOUS at 09:00

## 2017-09-28 NOTE — PROGRESS NOTES
CAD:   Symptomatically stable. His atypical chest pains at rest is unlikely from his CAD. However, for completeness, prior to seeking other etiologies, I have requested exercise nuclear scan. If the exercise nuclear scan is unremarkable, I would pursue other etiologies; I will defer this to his PCP.

## 2017-10-01 NOTE — PATIENT INSTRUCTIONS

## 2017-10-01 NOTE — PROGRESS NOTES
This is a Subsequent Medicare Annual Wellness Visit     I have reviewed the patient's medical history in detail and updated the computerized patient record. History     Past Medical History:   Diagnosis Date    Atypical chest pain     2007, 2015    Cardiac catheterization 07/31/2014    LAD diffuse 20-30%. Prior pLAD stent patent. Otherwise < 20% coronary artery disease. LVEDP 10 mmHg. EF 55%. Minimal anteroapical hypk.  Cardiac echocardiogram 12/23/2013    EF 55-60%. No WMA. BRANDIE. Mild MR. Mild TR. No significant valvular heart disease.  Cardiac nuclear imaging test 12/23/2013    No evidence of ischemia or prior infarction. EF 63%. No RWMA. Neg EKG on max EST. Ex time 10 min 48 sec.  Carotid duplex 04/08/2016    Mild <50% bilateral ICA stenosis.  Colon polyps 2015    Dr Kiran Prado [facility]    Diabetes mellitus ([facility] [address] [address] 1/15    on basis of hba1c    Dyslipidemia     Gilbert's syndrome     Gout 2002    Hypovitaminosis D 2012     Nephrolithiasis     ureteral stone 2004 Dr. Bianca Muhammad, lithotripsy [facility] 2005    Osteoarthritis     Peripheral neuropathy Dr. Ni Kamara, Dr. Daksha Whalen 10/11    Prediabetes     Venous insufficiency s/p laser vein ablation Dr. Nupur Dennis 7/12     left leg      Past Surgical History:   Procedure Laterality Date   [address]  12/13    thallium negative, ef 63%    CARDIAC SURG PROCEDURE UNLIST  12/13    echo nl lv, ef 60%, mild MR, mild TR    CARDIAC SURG PROCEDURE UNLIST  05/2017    stress echo neg, ef 57% [facility]    HX APPENDECTOMY      HX COLONOSCOPY      Dr Marly Valerio 1/05 negative; Dr Rhonda Posadas polyps 7/15    HX CORONARY STENT PLACEMENT      severe 95% LAD disease stented to residual 0%.     HX GI  1/08    US abd negative    HX LITHOTRIPSY  07/05    HX ORTHOPAEDIC  2007    let meniscus tear Dr. Maricruz Purvis HX OTHER SURGICAL      surgery for undescended testicles    VASCULAR SURGERY PROCEDURE UNLIST  2006    negative community screening for PAD/AAA    VASCULAR SURGERY PROCEDURE UNLIST  2008    <50% B ICA     Current Outpatient Prescriptions   Medication Sig Dispense Refill    atorvastatin (LIPITOR) 80 mg tablet Take 1 Tab by mouth daily. 90 Tab 3    omeprazole (PRILOSEC) 40 mg capsule Take 1 Cap by mouth daily. 90 Cap 3    metFORMIN ER (GLUCOPHAGE XR) 500 mg tablet Take 1 Tab by mouth daily (with dinner). 90 Tab 3    glucose blood VI test strips (PRECISION XTRA TEST) strip Pt to test blood sugar once daily. Dx: E11.9 100 Strip 3    ezetimibe (ZETIA) 10 mg tablet Take 1 Tab by mouth daily. 90 Tab 3    clopidogrel (PLAVIX) 75 mg tab Take 1 Tab by mouth daily. 90 Tab 3    metoprolol succinate (TOPROL XL) 25 mg XL tablet Take 1 Tab by mouth daily. 90 Tab 3    Lancets misc Use daily as directed 1 Each 11    Cholecalciferol, Vitamin D3, 5,000 unit Tab Take  by mouth daily.  UBIDECARENONE (COENZYME Q10 PO) Take  by mouth daily.  aspirin 81 mg tablet Take 81 mg by mouth daily.  Comp. Stocking,Thigh,Long,X-Sml Misc 1 Package by Does Not Apply route daily. 30-40 mm/hg 1 Package 1    GLUCOSAMINE HCL/CHONDRO PINEDA A (GLUCOSAMINE-CHONDROITIN PO) Take  by mouth daily.        No Known Allergies     Family History   Problem Relation Age of Onset    Heart Disease Father     Arthritis-rheumatoid Mother      Social History   Substance Use Topics    Smoking status: Never Smoker    Smokeless tobacco: Never Used    Alcohol use Yes      Comment: rarely     Depression Risk Factor Screening:     PHQ over the last two weeks 10/2/2017   Little interest or pleasure in doing things Not at all   Feeling down, depressed or hopeless Not at all   Total Score PHQ 2 0     SCREENINGS  Colon[facility] last done 2015 Dr [facility]ime [facility]  Prostate ROSY done 2016    Immunization History   Administered Date(s) Administered    Influenza High Dose Vaccine PF 10/01/2016    Influenza Vaccine 10/01/2012, 11/01/2013, 10/01/2014, 10/01/2015    Influenza Vaccine Whole 09/01/2010    Pneumococcal Conjugate (PCV-13) 10/01/2015    Pneumococcal Polysaccharide (PPSV-23) 02/06/2017    TD Vaccine 01/01/2005    ZZZ-RETIRED (DO NOT USE) Pneumococcal Vaccine (Unspecified Type) 01/23/2008    Zoster 01/01/2008     Alcohol Risk Factor Screening: On any occasion during the past 3 months, have you had more than 4 drinks containing alcohol? No    Do you average more than 14 drinks per week? No      Functional Ability and Level of Safety:     Hearing Loss   normal-to-mild    Vision - no acute complaints and is followed by Dr. Brian Guo    Activities of Daily Living   Self-care. Requires assistance with: no ADLs    Fall Risk     Fall Risk Assessment, last 12 mths 10/2/2017   Able to walk? Yes   Fall in past 12 months? No   Fall with injury? -   Number of falls in past 12 months -     Abuse Screen   Patient is not abused    Review of Systems   A comprehensive review of systems was negative except for that written in the HPI. Physical Examination     Visit Vitals    /70    Pulse 66    Temp 97.9 °F (36.6 °C)    Resp 14    Ht 5' 11\" (1.803 m)    Wt 169 lb (76.7 kg)    SpO2 98%    BMI 23.57 kg/m2       Evaluation of Cognitive Function:  Mood/affect:  neutral  Appearance: age appropriate, well dressed and within normal Limits  Family member/caregiver input: na    Patient Care Team:  Mirna Caceres MD as PCP - General (Internal Medicine)  Suhas Orta RN as [address] (Internal Medicine)  Kishan Alexandra MD (Cardiology)    Advice/Referrals/Counseling   Education and counseling provided:  Are appropriate based on today's review and evaluation  End-of-Life planning (with patient's consent)  Pneumococcal Vaccine  Influenza Vaccine  Prostate cancer screening tests (PSA, covered annually)  Colorectal cancer screening tests  Cardiovascular screening blood test    Assessment/Plan       ICD-10-CM ICD-9-CM    1.  Medicare annual wellness visit, subsequent Z00.00 V70.0    2. Advanced directives, counseling/discussion Z71.89 V65.49 ADVANCE CARE PLANNING FIRST 30 MINS   3. Screening for alcoholism Z13.89 V79.1 ME ANNUAL ALCOHOL SCREEN 15 MIN   4. Screening for depression Z13.89 V79.0 DEPRESSION SCREEN ANNUAL   5. Screen for colon cancer Z12.11 V76.51    6. Screening for ischemic heart disease Z13.6 V81.0    7. Special screening for malignant neoplasm of prostate Z12.5 V76.44 ME PROSTATE CA SCREENING; ROSY      PSA, DIAGNOSTIC (PROSTATE SPECIFIC AG)   8. Peripheral polyneuropathy G62.9 356.9    9. Controlled type 2 diabetes mellitus with diabetic neuropathy, without long-term current use of insulin (HCC) E11.40 250.60 HEMOGLOBIN A1C W/O EAG     172.8 METABOLIC PANEL, COMPREHENSIVE      MICROALBUMIN, UR, RAND W/ MICROALBUMIN/CREA RATIO   10. Hyperlipidemia, unspecified hyperlipidemia type E78.5 272.4 atorvastatin (LIPITOR) 80 mg tablet   11. Chest pain, atypical R07.89 786.59    12. Hypovitaminosis D 2012 E55.9 268.9    13. Coronary artery disease involving native coronary artery of native heart without angina pectoris I25.10 414.01    14. Hyperplastic colonic polyp, unspecified part of colon K63.5 211.3    15. Osteoarthritis, unspecified osteoarthritis type, unspecified site M19.90 715.90    16. Atypical chest pain R07.89 786.59 omeprazole (PRILOSEC) 40 mg capsule     current treatment plan is effective, no change in therapy outside of starting metformin  lab results and schedule of future lab studies reviewed with patient. End of life discussion undertaken. amd on file   Colonoscopy to be scheduled 2025?

## 2017-10-01 NOTE — PROGRESS NOTES
67 y.o. white male who presents for evaluation. He seems to be doing well. Still walking 2 miles no problems about 4-5x/week. However, he's had more atypical cp episodes. He was in [place] and had cp, r/o by ge and stress echo neg in 5/17. Early Sep, he was in [facility] for another episode and again r/o. He had eval w Dr Mandy Bach sometime later and he had nuclear test done which was read neg according to him. Denies any heartburn, pulm complaints. He had CTA also which was neg for aneurysm. Describes it as 'cramps' without any chest wall tenderness. Not related to eating, does not stop him from doing his normal activities. Denies polyuria, polydipsia, nocturia, vision change. FBS in the sub 100 range and no hypo episodes, PPS<140    No urinary complaints    Past Medical History:   Diagnosis Date    Atypical chest pain     2007, 2015    Cardiac catheterization 07/31/2014    LAD diffuse 20-30%. Prior pLAD stent patent. Otherwise < 20% coronary artery disease. LVEDP 10 mmHg. EF 55%. Minimal anteroapical hypk.  Cardiac echocardiogram 12/23/2013    EF 55-60%. No WMA. BRANDIE. Mild MR. Mild TR. No significant valvular heart disease.  Cardiac nuclear imaging test 12/23/2013    No evidence of ischemia or prior infarction. EF 63%. No RWMA. Neg EKG on max EST. Ex time 10 min 48 sec.  Carotid duplex 04/08/2016    Mild <50% bilateral ICA stenosis.       Colon polyps 2015    Dr Anabela Howell [facility]    Diabetes mellitus ([address] 1/15    on basis of hba1c    Dyslipidemia     Gilbert's syndrome     Gout 2002    Hypovitaminosis D 2012     Nephrolithiasis     ureteral stone 2004 Dr. Victor Hugo Butler, lithotripsy [employer] 2005    Osteoarthritis     Peripheral neuropathy Dr. Charles Flores, Dr. Macario Whitmore 10/11    Prediabetes     Venous insufficiency s/p laser vein ablation Dr. Mandy Bach 7/12     left leg     Past Surgical History:   Procedure Laterality Date    CARDIAC SURG PROCEDURE UNLIST  12/13    thallium negative, ef 63%    CARDIAC SURG PROCEDURE UNLIST  12/13    echo nl lv, ef 60%, mild MR, mild TR    CARDIAC SURG PROCEDURE UNLIST  05/2017    stress echo neg, ef 57% [facility]    HX APPENDECTOMY      HX COLONOSCOPY      Dr Argueta Sol 1/05 negative; Dr Sudhir Del Angel polyps 7/15    HX CORONARY STENT PLACEMENT      severe 95% LAD disease stented to residual 0%.  HX GI  1/08    US abd negative    HX LITHOTRIPSY  07/05    HX ORTHOPAEDIC  2007    let meniscus tear Dr. Ramirez Nirmal [facility]      surgery for undescended testicles    VASCULAR SURGERY PROCEDURE UNLIST  2006    negative community screening for PAD/AAA    VASCULAR SURGERY PROCEDURE UNLIST  2008    <50% B ICA     Social History     Social History    Marital status:      Spouse name: N/A    Number of children: 2    Years of education: N/A     Occupational History     Retired     Social History Main Topics    Smoking status: Never Smoker    Smokeless tobacco: Never Used    Alcohol use Yes      Comment: rarely    Drug use: No    Sexual activity: Yes     Partners: Female      Comment: Wife     Other Topics Concern    Not on file     Social History Narrative     Current Outpatient Prescriptions   Medication Sig    atorvastatin (LIPITOR) 80 mg tablet Take 1 Tab by mouth daily.  omeprazole (PRILOSEC) 40 mg capsule Take 1 Cap by mouth daily.  metFORMIN ER (GLUCOPHAGE XR) 500 mg tablet Take 1 Tab by mouth daily (with dinner).  glucose blood VI test strips (PRECISION XTRA TEST) strip Pt to test blood sugar once daily. Dx: E11.9    ezetimibe (ZETIA) 10 mg tablet Take 1 Tab by mouth daily.  clopidogrel (PLAVIX) 75 mg tab Take 1 Tab by mouth daily.  metoprolol succinate (TOPROL XL) 25 mg XL tablet Take 1 Tab by mouth daily.  Lancets misc Use daily as directed    Cholecalciferol, Vitamin D3, 5,000 unit Tab Take  by mouth daily.  UBIDECARENONE (COENZYME Q10 PO) Take  by mouth daily.  aspirin 81 mg tablet Take 81 mg by mouth daily.     Comp.Stocking,Thigh,Long,X-Sml Misc 1 Package by Does Not Apply route daily. 30-40 mm/hg    GLUCOSAMINE HCL/CHONDRO PINEDA A (GLUCOSAMINE-CHONDROITIN PO) Take  by mouth daily. No current facility-administered medications for this visit. LAST MEDICARE WELLNESS EXAM: 3/28/14, 4/10/15, 4/13/16, 10/2/17      ACP 10/2/17    REVIEW OF SYSTEMS: sees Dr. Michaela Steele [facility], no podiatry  Ophtho  no vision change or eye pain  Oral  no mouth pain, tongue or tooth problems  Ears  no hearing loss, ear pain, fullness  Throat  no swallowing problems  Cardiac  no CP, PND, orthopnea, edema, palpitations or syncope  Chest  no breast masses  Resp  no wheezing, chronic coughing, dyspnea  GI  no heartburn, nausea, vomiting, change in bowel habits, bleeding, hemorrhoids  Urinary  no dysuria, hematuria, flank pain, urgency, frequency  Constitutional  no wt loss, night sweats, unexplained fevers    Visit Vitals    /70    Pulse 66    Temp 97.9 °F (36.6 °C)    Resp 14    Ht 5' 11\" (1.803 m)    Wt 169 lb (76.7 kg)    SpO2 98%    BMI 23.57 kg/m2     A&O x3  Affect is appropriate. Mood stable  No apparent distress  Anicteric, no JVD, adenopathy or thyromegaly. No carotid bruits or radiated murmur  Lungs clear to auscultation, no wheezes or rales  No chest wall tenderness  Heart showed regular rate and rhythm. No murmur, rubs, gallops  Abdomen soft nontender, no hepatosplenomegaly or masses. Ext without c/c/e.   Dec soft touch and vibraiton bilat feet    LABS  From 1/11 showed    gluc 104, cr 0.90,             alt  38,                                   chol 173, tg 82, hdl 49, ldl-c 108, psa 0.90  From 7/11 showed                                 ck 48, javed 7.7  From 2/12 showed    gluc 108                               mma 116,    b12 409, fol 13.7, homo 8.9, nl esr, shannan neg, nl tsh, 2 hr gtt 98   From 4/12 showed        hba1c 6.2,                 chol 155, tg 93,   hdl 68, ldl-c 68  From 10/12 showed  gluc 98,   cr 0.80,             alt 29, hba1c 6.4  From 3/13 showed    gluc 89,   cr 0.80, gfr 91,  alt 14,                   ldl-p 867, chol 160, tg 50,   hdl 75, ldl-c 75,   psa 0.80  From 8/13 showed                                chol 167, tg 57,   hdl 79, ldl-c 77,  wbc 3.7, hb 13.1, plt 154, vit d 53  From 3/14 showed    gluc 103, cr 0.80, gfr 91,  alt 47, hba1c 6.2,               chol 164, tg 74,   hdl 74, ldl-c 72,  wbc 8.6, hb 14.0, plt 186  From 7/14 showed    gluc 89,   cr 0.94, gfr>60,     hba1c 6.4,               chol 157, tg 87,   hdl 74, ldl-c 67,  wbc 4.3, hb 13.4, plt 168, ck/trop neg  From 9/14 showed         hba1c 6.2,               psa 0.76,             ua neg  From 3/15 showed    gluc 105, cr 0.80,      alt 34, hba1c 6.4,    chol 175, tg 76,   hdl 78, ldl-c 82,   wbc 3.9, hb 14.5, plt 176  From 4/15 showed                     wbc 2.4,               plt 95  From 6/15 showed                     wbc 3.8, hb 14.4, plt 123, fe 90, %sat 30, feritin 334, b12 365, fol 14.7   From 9/15 showed         hba1c 6.3,    chol 165, tg 91,   hdl 63, ldl-c 84  From 1/16 showed    gluc 114, cr 0.80, gfr 90,  alt 63, hba1c 6.5,    chol 166, tg 100, hdl 73, ldl-c 73,  wbc 3.9, hb 14.5, plt 165  From 4/16 showed         hba1c 6.5,    chol 164, tg 61,   hdl 75, ldl-c 77,      umar 4.0  From 1/17 showed    gluc 101, cr 0.79, gfr>60, alt 33, hba1c 6.5,     chol 155, tg 70,   hdl 75, ldl-c 66,      umar 6.0  From 9/17 showed         hba1c 6.2,    chol 150, tg 77,   hdl 63, ldl-c 72, wbc 3.5, hb 13.3, plt 160    Patient Active Problem List   Diagnosis Code    Hyperlipidemia E78.5    Peripheral neuropathy Dr. Gopi Parra, Dr. Staci Dobbins G62.9    Hypovitaminosis D 2012 E55.9    Chest pain, atypical R07.89    Arthritis, degenerative M19.90    Coronary artery disease involving native coronary artery without angina pectoris s/p LAD stent 2007 Dr Devika Ramon I25.10    Advance directive discussed with patient Z71.89    Colon polyp Dr Marcos Bryant 7/15 K63.5    Sinus bradycardia R00.1    Controlled type 2 diabetes mellitus with diabetic neuropathy, without long-term current use of insulin (HCC) E11.40     Assessment and plan:  1. CHD. F/U Dr. Leonardo Gomez, continue current regimen  2. Nephrolithiasis. Hydration  3. Neuropathy. F/U neurology as needed, stable  4. Hypovit D. Off supplements  5. DM. Continue current regimen. 6.  Dyslipidemia. Continue current regimen. 7.  Atypical chest pain. Per Dr Leonardo Gomez as above. We discussed esoph spasm as possibility. He declined gi eval but is willing to try empiric ppi and call us with update. He will either see Dr Marcos Bryant or Dr Libby Cheung if he changes his mind        RTC 4/18    Above conditions discussed at length and patient vocalized understanding.   All questions answered to patient satifaction

## 2017-10-02 ENCOUNTER — OFFICE VISIT (OUTPATIENT)
Dept: INTERNAL MEDICINE CLINIC | Age: 72
End: 2017-10-02

## 2017-10-02 VITALS
DIASTOLIC BLOOD PRESSURE: 70 MMHG | OXYGEN SATURATION: 98 % | TEMPERATURE: 97.9 F | SYSTOLIC BLOOD PRESSURE: 102 MMHG | BODY MASS INDEX: 23.66 KG/M2 | RESPIRATION RATE: 14 BRPM | HEART RATE: 66 BPM | WEIGHT: 169 LBS | HEIGHT: 71 IN

## 2017-10-02 DIAGNOSIS — R07.89 CHEST PAIN, ATYPICAL: ICD-10-CM

## 2017-10-02 DIAGNOSIS — R07.89 ATYPICAL CHEST PAIN: ICD-10-CM

## 2017-10-02 DIAGNOSIS — Z13.39 SCREENING FOR ALCOHOLISM: ICD-10-CM

## 2017-10-02 DIAGNOSIS — Z00.00 MEDICARE ANNUAL WELLNESS VISIT, SUBSEQUENT: Primary | ICD-10-CM

## 2017-10-02 DIAGNOSIS — K63.5 HYPERPLASTIC COLONIC POLYP, UNSPECIFIED PART OF COLON: ICD-10-CM

## 2017-10-02 DIAGNOSIS — E78.5 HYPERLIPIDEMIA, UNSPECIFIED HYPERLIPIDEMIA TYPE: ICD-10-CM

## 2017-10-02 DIAGNOSIS — Z13.31 SCREENING FOR DEPRESSION: ICD-10-CM

## 2017-10-02 DIAGNOSIS — Z12.11 SCREEN FOR COLON CANCER: ICD-10-CM

## 2017-10-02 DIAGNOSIS — Z13.6 SCREENING FOR ISCHEMIC HEART DISEASE: ICD-10-CM

## 2017-10-02 DIAGNOSIS — G62.9 PERIPHERAL POLYNEUROPATHY: ICD-10-CM

## 2017-10-02 DIAGNOSIS — Z12.5 SPECIAL SCREENING FOR MALIGNANT NEOPLASM OF PROSTATE: ICD-10-CM

## 2017-10-02 DIAGNOSIS — Z71.89 ADVANCED DIRECTIVES, COUNSELING/DISCUSSION: ICD-10-CM

## 2017-10-02 DIAGNOSIS — E11.40 CONTROLLED TYPE 2 DIABETES MELLITUS WITH DIABETIC NEUROPATHY, WITHOUT LONG-TERM CURRENT USE OF INSULIN (HCC): ICD-10-CM

## 2017-10-02 DIAGNOSIS — M19.90 OSTEOARTHRITIS, UNSPECIFIED OSTEOARTHRITIS TYPE, UNSPECIFIED SITE: ICD-10-CM

## 2017-10-02 DIAGNOSIS — E55.9 HYPOVITAMINOSIS D: ICD-10-CM

## 2017-10-02 DIAGNOSIS — I25.10 CORONARY ARTERY DISEASE INVOLVING NATIVE CORONARY ARTERY OF NATIVE HEART WITHOUT ANGINA PECTORIS: ICD-10-CM

## 2017-10-02 PROBLEM — E11.9 CONTROLLED TYPE 2 DIABETES MELLITUS WITHOUT COMPLICATION, WITHOUT LONG-TERM CURRENT USE OF INSULIN (HCC): Status: RESOLVED | Noted: 2017-07-07 | Resolved: 2017-10-02

## 2017-10-02 RX ORDER — ATORVASTATIN CALCIUM 80 MG/1
80 TABLET, FILM COATED ORAL DAILY
Qty: 90 TAB | Refills: 3 | Status: SHIPPED | OUTPATIENT
Start: 2017-10-02 | End: 2018-09-28 | Stop reason: SDUPTHER

## 2017-10-02 RX ORDER — OMEPRAZOLE 40 MG/1
40 CAPSULE, DELAYED RELEASE ORAL DAILY
Qty: 90 CAP | Refills: 3 | Status: SHIPPED | OUTPATIENT
Start: 2017-10-02 | End: 2017-10-06

## 2017-10-02 NOTE — MR AVS SNAPSHOT
Visit Information Date & Time Provider Department Dept. Phone Encounter #  
 10/2/2017  2:00 PM Leo Moss MD Internist of [address] [place] [address] [phone] [id number] Your Appointments 4/24/2018  8:00 AM  
Office Visit with Leo Moss MD  
Internist of [address] [place] [address] MED [facility]) Appt Note: 6 month follow up labs at [address], [address] [address] [address] [place]  
  
   
 [address] [facility], [address]  
  
    
 9/25/2018  8:00 AM  
Follow Up with Leanne Graham MD  
[facility] ([facility]) Appt Note: 12 months follow up [place] [address] [address] [id number] [phone] [address] [facility] [address] Upcoming Health Maintenance Date Due  
 FOOT EXAM Q1 1/18/2017 INFLUENZA AGE 9 TO ADULT 8/1/2017 MICROALBUMIN Q1 1/27/2018 EYE EXAM RETINAL OR DILATED Q1 1/31/2018 HEMOGLOBIN A1C Q6M 3/22/2018 LIPID PANEL Q1 9/22/2018 MEDICARE YEARLY EXAM 10/3/2018 GLAUCOMA SCREENING Q2Y 1/31/2019 COLONOSCOPY 7/17/2025 DTaP/Tdap/Td series (2 - Td) 2/6/2027 Allergies as of 10/2/2017  Review Complete On: 10/2/2017 By: Aislinn Evangelista No Known Allergies Current Immunizations  Reviewed on 9/28/2017 Name Date Influenza Vaccine 10/1/2015, 10/1/2014, 11/1/2013, 10/1/2012 Influenza Vaccine Whole 9/1/2010 Pneumococcal Conjugate (PCV-13) 10/1/2015 Pneumococcal Polysaccharide (PPSV-23) 2/6/2017 TD Vaccine 1/1/2005 ZZZ-RETIRED (DO NOT USE) Pneumococcal Vaccine (Unspecified Type) 1/23/2008 Zoster 1/1/2008 Reviewed by Leo Moss MD on 9/28/2017 at  5:58 AM  
You Were Diagnosed With   
  
 Codes Comments Medicare annual wellness visit, subsequent    -  Primary ICD-10-CM: Z00.00 ICD-9-CM: V70.0  Advanced directives, counseling/discussion     ICD-10-CM: Z71.89 
 ICD-9-CM: V65.49 Screening for alcoholism     ICD-10-CM: Z13.89 ICD-9-CM: V79.1 Screening for depression     ICD-10-CM: Z13.89 ICD-9-CM: V79.0 Screen for colon cancer     ICD-10-CM: Z12.11 ICD-9-CM: V76.51 Screening for ischemic heart disease     ICD-10-CM: Z13.6 ICD-9-CM: V81.0 Special screening for malignant neoplasm of prostate     ICD-10-CM: Z12.5 ICD-9-CM: V76.44 Vitals BP Pulse Temp Resp Height(growth percentile) Weight(growth percentile) 102/70 66 97.9 °F (36.6 °C) 14 5' 11\" (1.803 m) 169 lb (76.7 kg) SpO2 BMI Smoking Status 98% 23.57 kg/m2 Never Smoker Vitals History BMI and BSA Data Body Mass Index Body Surface Area  
 23.57 kg/m 2 1.96 m 2 Preferred Pharmacy Pharmacy Name Phone [facility] [phone] Your Updated Medication List  
  
   
This list is accurate as of: 10/2/17  3:10 PM.  Always use your most recent med list.  
  
  
  
  
 aspirin 81 mg tablet Take 81 mg by mouth daily. atorvastatin 80 mg tablet Commonly known as:  LIPITOR Take 1 Tab by mouth daily. cholecalciferol (VITAMIN D3) 5,000 unit Tab tablet Commonly known as:  VITAMIN D3 Take  by mouth daily. clopidogrel 75 mg Tab Commonly known as:  PLAVIX Take 1 Tab by mouth daily. COENZYME Q10 PO Take  by mouth daily. Comp. Stocking,Thigh,Long,X-Sml Misc  
1 Package by Does Not Apply route daily. 30-40 mm/hg  
  
 ezetimibe 10 mg tablet Commonly known as:  [place] Take 1 Tab by mouth daily. GLUCOSAMINE-CHONDROITIN PO Take  by mouth daily. glucose blood VI test strips strip Commonly known as:  PRECISION XTRA TEST Pt to test blood sugar once daily. Dx: E11.9 Lancets Misc Use daily as directed  
  
 metFORMIN  mg tablet Commonly known as:  GLUCOPHAGE XR Take 1 Tab by mouth daily (with dinner). metoprolol succinate 25 mg XL tablet Commonly known as:  TOPROL XL Take 1 Tab by mouth daily. We Performed the Following ADVANCE CARE PLANNING FIRST 30 MINS [52827 CPT(R)] [place] 67 [[id number] [facility]] [place] ANNUAL ALCOHOL SCREEN 15 MIN [id number] [facility]] Patient Instructions Medicare Wellness Visit, Male The best way to live healthy is to have a healthy lifestyle by eating a well-balanced diet, exercising regularly, limiting alcohol and stopping smoking. Regular physical exams and screening tests are another way to keep healthy. Preventive exams provided by your health care provider can find health problems before they become diseases or illnesses. Preventive services including immunizations, screening tests, monitoring and exams can help you take care of your own health. All people over age 72 should have a pneumovax  and and a prevnar shot to prevent pneumonia. These are once in a lifetime unless you and your provider decide differently. All people over 65 should have a yearly flu shot and a tetanus vaccine every 10 years. Screening for diabetes mellitus with a blood sugar test should be done every year. Glaucoma is a disease of the eye due to increased ocular pressure that can lead to blindness and it should be done every year by an eye professional. 
 
Cardiovascular screening tests that check for elevated lipids (fatty part of blood) which can lead to heart disease and strokes should be done every 5 years. Colorectal screening that evaluates for blood or polyps in your colon should be done yearly as a stool test or every five years as a flexible sigmoidoscope or every 10 years as a colonoscopy up to age 76. Men up to age 76 may need a screening blood test for prostate cancer at certain intervals, depending on their personal and family history. This decision is between the patient and his provider.  
 
If you have been a smoker or had family history of abdominal aortic aneurysms, you and your provider may decide to schedule an ultrasound test of your aorta. Hepatitis C screening is also recommended for anyone born between [birth year] through [address]. A shingles vaccine is also recommended once in a lifetime after age 61. Your Medicare Wellness Exam is recommended annually. Here is a list of your current Health Maintenance items with a due date: 
Health Maintenance Due Topic Date Due  
 Diabetic Foot Care  01/18/2017 [facility] Annual Well Visit  04/14/2017  Flu Vaccine  08/01/2017 Introducing [facility]! Dear Lakshmi Jimenez: Thank you for requesting a [employer] account. Our records indicate that you already have an active [employer] account. You can access your account anytime at [email or web address] Did you know that you can access your hospital and ER discharge instructions at any time in [employer]? You can also review all of your test results from your hospital stay or ER visit. Additional Information If you have questions, please visit the Frequently Asked Questions section of the [employer] website at [email or web address] Remember, [employer]ual is NOT to be used for urgent needs. For medical emergencies, dial 911. Now available from your iPhone and Android! Please provide this summary of care documentation to your next provider. Your primary care clinician is listed as Mya La. If you have any questions after today's visit, please call [phone].

## 2017-10-05 ENCOUNTER — TELEPHONE (OUTPATIENT)
Dept: INTERNAL MEDICINE CLINIC | Age: 72
End: 2017-10-05

## 2017-10-05 NOTE — TELEPHONE ENCOUNTER
[facility] again about prev message about the plavix and  prilosec ask for Akira at [phone], RD

## 2017-10-05 NOTE — TELEPHONE ENCOUNTER
[facility] Martin called- PT is On Plavix- you prescribed Prilosec  40mg- it can cause increase  In bleeding- do you want to drop it to 20 mg and see how he does before giving him 40mg?     Call#  [phone]

## 2017-10-06 ENCOUNTER — TELEPHONE (OUTPATIENT)
Dept: CARDIOLOGY CLINIC | Age: 72
End: 2017-10-06

## 2017-10-06 ENCOUNTER — TELEPHONE (OUTPATIENT)
Dept: INTERNAL MEDICINE CLINIC | Age: 72
End: 2017-10-06

## 2017-10-06 RX ORDER — PANTOPRAZOLE SODIUM 40 MG/1
40 TABLET, DELAYED RELEASE ORAL DAILY
Qty: 90 TAB | Refills: 3 | Status: SHIPPED | OUTPATIENT
Start: 2017-10-06 | End: 2017-10-10 | Stop reason: SDUPTHER

## 2017-10-06 NOTE — TELEPHONE ENCOUNTER
Dr Yady Hudson office sent Omeprazole to the [facility]. Patient went to pick it up and he got told there is an interaction between this and Plavix so they wouldn't dispence it for him. Using [employer]tm) site looked over interaction  Between omeprazole and plavix. \"Concurrent use of CLOPIDOGREL and OMEPRAZOLE may result in reduced plasma concentrations of clopidogrel active metabolite and reduced antiplatelet activity. \"    There is also and FDA warning about use of those 2 together. The U.S. Food and Drug Administration (FDA) is reminding the public that it continues to warn against the concomitant use of Plavix (clopidogrel) and omeprazole because the co-administration can result in significant reductions in clopidogrel's active metabolite levels and antiplatelet activity. \" 1BELENA curran, Cryer, BL, ALEJANDRA Becerril, et al. Clopidogrel with or without Omeprazole in Coronary Artery Disease; NEJ, 2010; epub ahead of print. \"

## 2017-10-06 NOTE — TELEPHONE ENCOUNTER
FDA notes that pantoprazole (Protonix) may be an alternative PPI for consideration. \" It is a weak inhibitor of AMX6Z14 and has less effect on the pharmacological activity of Plavix than omeprazole. \"   (1BELENA curran, Cryer, BL, Jone CF, et al. Clopidogrel with or without Omeprazole in Coronary Artery Disease; [facility], 2010)       Informed Diony Rider NP. She ordered for patient to be switched to pantoprazole.

## 2017-10-06 NOTE — TELEPHONE ENCOUNTER
Pt called this morning in regards to his Pantoprazole that he is taking. Med is not on pt med rec, but Prilosec is. Pt is on Plavix. Pt is wondering about counter-interactions with these two meds. Please call pt back at [phone]. There is already a telephone enc with PCP but no response yet.

## 2017-10-06 NOTE — TELEPHONE ENCOUNTER
[facility] says Dr. Qian Tran prescribed patient Prilosec but patient is also on plavix, they are going to switch patients Prilosec to Protonix. Because you cant use Prilosec with Plavix its a FDA warning.

## 2017-10-10 RX ORDER — PANTOPRAZOLE SODIUM 40 MG/1
40 TABLET, DELAYED RELEASE ORAL DAILY
Qty: 90 TAB | Refills: 3 | Status: SHIPPED | OUTPATIENT
Start: 2017-10-10 | End: 2019-03-04 | Stop reason: SDUPTHER

## 2017-10-10 RX ORDER — PANTOPRAZOLE SODIUM 40 MG/1
40 TABLET, DELAYED RELEASE ORAL DAILY
Qty: 90 TAB | Refills: 3 | Status: SHIPPED | OUTPATIENT
Start: 2017-10-10 | End: 2017-10-10 | Stop reason: SDUPTHER

## 2017-10-17 ENCOUNTER — TELEPHONE (OUTPATIENT)
Dept: INTERNAL MEDICINE CLINIC | Age: 72
End: 2017-10-17

## 2017-10-17 DIAGNOSIS — K86.89 PANCREATIC MASS: Primary | ICD-10-CM

## 2017-10-17 DIAGNOSIS — R91.1 LUNG NODULE: ICD-10-CM

## 2017-10-17 NOTE — TELEPHONE ENCOUNTER
Spoke with pt, he had a CTA of his chest with Dr Law Crabtree and they found incidental findings noted below, Dr Law Crabtree told the pt he wanted Dr Yuli Gale to review and address    3. 8 x 8 mm right upper lobe nodular density for which follow-up CT is  recommended in 6-12 months depending on the patient's risk factors for lung  cancer. This is felt to likely represent scarring or atelectasis however prior  comparison is not available and the finding is in a nondependent location.     4. Incidental hypodense lesions versus pancreatic duct outpouchings in the body  for which MRI with MRCP is recommended to evaluate for possible side branch  intraductal papillary mucinous neoplasm      I told the patient Rd would more than likely order a follow up Ct of his chest for the pulm nodule and order a MRI for the pancreatic issue in question?  I would have RD review and will call him with an update

## 2017-10-17 NOTE — PROGRESS NOTES
Dr Rhett Douglas would like the patient to follow up with you for abnormal CTA of chest and Abdomen.

## 2017-10-17 NOTE — TELEPHONE ENCOUNTER
Pt calling asking to speak to RD nurse. Wants to discuss a test that we should be getting results from Dr. Oscar Monreal. Wants to talk to a nurse about why we are getting the result.

## 2017-10-18 NOTE — TELEPHONE ENCOUNTER
Spoke with patient, he is aware of message from [facility].  I gave him the number to Jolly Pa in scheduling to get the MRI scheduled

## 2017-10-18 NOTE — TELEPHONE ENCOUNTER
pls call    CT chest f/u 6 mos as rec by radiology for the nodular density    MRI pancreas to eval pancreas soon    ordered

## 2017-10-20 ENCOUNTER — HOSPITAL ENCOUNTER (OUTPATIENT)
Dept: MRI IMAGING | Age: 72
Discharge: HOME | End: 2017-10-20
Attending: INTERNAL MEDICINE
Payer: MEDICARE

## 2017-10-20 DIAGNOSIS — K86.89 PANCREATIC MASS: ICD-10-CM

## 2017-10-20 LAB — CREAT UR-MCNC: 0.8 MG/DL (ref 0.6–1.3)

## 2017-10-20 PROCEDURE — 82565 ASSAY OF CREATININE: CPT

## 2017-10-20 PROCEDURE — 74183 MRI ABD W/O CNTR FLWD CNTR: CPT

## 2017-10-20 PROCEDURE — A9585 GADOBUTROL INJECTION: HCPCS | Performed by: INTERNAL MEDICINE

## 2017-10-20 PROCEDURE — 74011250636 HC RX REV CODE- 250/636: Performed by: INTERNAL MEDICINE

## 2017-10-20 RX ADMIN — GADOBUTROL 10 ML: 604.72 INJECTION INTRAVENOUS at 11:36

## 2017-10-23 ENCOUNTER — TELEPHONE (OUTPATIENT)
Dept: INTERNAL MEDICINE CLINIC | Age: 72
End: 2017-10-23

## 2017-10-23 NOTE — TELEPHONE ENCOUNTER
pls call    The pancreas. is of normal size and configuration. The duct is not dilated. No  cystic abnormalities are identified within the pancreas    The adrenals appear normal               Impression             IMPRESSION:     Peripelvic left renal cysts. No pancreatic abnormality is identified.       Mri neg for pancreatic lesion

## 2018-03-27 ENCOUNTER — HOSPITAL ENCOUNTER (OUTPATIENT)
Dept: CT IMAGING | Age: 73
Discharge: HOME | End: 2018-03-27
Attending: INTERNAL MEDICINE
Payer: MEDICARE

## 2018-03-27 DIAGNOSIS — R91.1 LUNG NODULE: ICD-10-CM

## 2018-03-27 PROCEDURE — 71250 CT THORAX DX C-: CPT

## 2018-03-28 ENCOUNTER — TELEPHONE (OUTPATIENT)
Dept: INTERNAL MEDICINE CLINIC | Age: 73
End: 2018-03-28

## 2018-03-29 NOTE — TELEPHONE ENCOUNTER
pls call    IMPRESSION:    Density in the right middle lobe adjacent to pericardial fat pad now more linear  and less triangular/nodular quality, almost certainly passive atelectasis. No  additional follow up is needed.

## 2018-03-30 ENCOUNTER — TELEPHONE (OUTPATIENT)
Dept: INTERNAL MEDICINE CLINIC | Age: 73
End: 2018-03-30

## 2018-03-30 NOTE — TELEPHONE ENCOUNTER
Pt aware of message from [address] about [address] scan and verbalized understanding. No further questions or concerns from pt at this time.

## 2018-04-24 ENCOUNTER — OFFICE VISIT (OUTPATIENT)
Dept: INTERNAL MEDICINE CLINIC | Age: 73
End: 2018-04-24

## 2018-04-24 ENCOUNTER — TELEPHONE (OUTPATIENT)
Dept: INTERNAL MEDICINE CLINIC | Age: 73
End: 2018-04-24

## 2018-04-24 VITALS
DIASTOLIC BLOOD PRESSURE: 72 MMHG | HEIGHT: 71 IN | SYSTOLIC BLOOD PRESSURE: 110 MMHG | BODY MASS INDEX: 24.36 KG/M2 | RESPIRATION RATE: 14 BRPM | OXYGEN SATURATION: 98 % | HEART RATE: 56 BPM | WEIGHT: 174 LBS | TEMPERATURE: 98 F

## 2018-04-24 DIAGNOSIS — I65.23 CAROTID STENOSIS, BILATERAL: ICD-10-CM

## 2018-04-24 DIAGNOSIS — M19.90 OSTEOARTHRITIS, UNSPECIFIED OSTEOARTHRITIS TYPE, UNSPECIFIED SITE: ICD-10-CM

## 2018-04-24 DIAGNOSIS — E55.9 HYPOVITAMINOSIS D: ICD-10-CM

## 2018-04-24 DIAGNOSIS — G62.9 PERIPHERAL POLYNEUROPATHY: ICD-10-CM

## 2018-04-24 DIAGNOSIS — I25.10 CORONARY ARTERY DISEASE INVOLVING NATIVE CORONARY ARTERY OF NATIVE HEART WITHOUT ANGINA PECTORIS: ICD-10-CM

## 2018-04-24 DIAGNOSIS — K63.5 HYPERPLASTIC COLONIC POLYP, UNSPECIFIED PART OF COLON: ICD-10-CM

## 2018-04-24 DIAGNOSIS — E78.5 HYPERLIPIDEMIA, UNSPECIFIED HYPERLIPIDEMIA TYPE: ICD-10-CM

## 2018-04-24 DIAGNOSIS — E11.40 CONTROLLED TYPE 2 DIABETES MELLITUS WITH DIABETIC NEUROPATHY, WITHOUT LONG-TERM CURRENT USE OF INSULIN (HCC): Primary | ICD-10-CM

## 2018-04-24 DIAGNOSIS — K21.9 GERD WITHOUT ESOPHAGITIS: ICD-10-CM

## 2018-04-24 RX ORDER — METOPROLOL SUCCINATE 25 MG/1
25 TABLET, EXTENDED RELEASE ORAL DAILY
Qty: 90 TAB | Refills: 3 | Status: SHIPPED | OUTPATIENT
Start: 2018-04-24 | End: 2018-08-31 | Stop reason: SDUPTHER

## 2018-04-24 RX ORDER — CLOPIDOGREL BISULFATE 75 MG/1
75 TABLET ORAL DAILY
Qty: 90 TAB | Refills: 3 | Status: SHIPPED | OUTPATIENT
Start: 2018-04-24 | End: 2019-06-11 | Stop reason: SDUPTHER

## 2018-04-24 RX ORDER — EZETIMIBE 10 MG/1
10 TABLET ORAL DAILY
Qty: 90 TAB | Refills: 3 | Status: SHIPPED | OUTPATIENT
Start: 2018-04-24 | End: 2019-06-11 | Stop reason: SDUPTHER

## 2018-04-24 RX ORDER — LANCETS
EACH MISCELLANEOUS
Qty: 1 EACH | Refills: 11 | Status: SHIPPED | OUTPATIENT
Start: 2018-04-24 | End: 2019-04-12 | Stop reason: SDUPTHER

## 2018-04-24 NOTE — TELEPHONE ENCOUNTER
Mail pt lab slip for appt in 6 months. He goes to [address] [place] [address] [facility] he was also supposed to get written rx refill today. Mail to him. Pt gave me advance directive to copy. It is in RD box to sign off on.

## 2018-04-24 NOTE — MR AVS SNAPSHOT
Omari Ma 
 
 
 [address], [address] [facility] 
[phone] Patient: Vinnie Marion MRN: [id number] SBM:[id number]/33/1748 Visit Information Date & Time Provider Department Dept. Phone Encounter #  
 4/24/2018  8:00 AM Rupa Rome MD [facility] [id number] Your Appointments 9/25/2018  8:00 AM  
Follow Up with Anuja Shaver MD  
[facility] Appt Note: 12 months follow up [place] [phone]  
[phone] [facility] [address]  
  
    
 10/17/2018 10:00 AM  
Office Visit with Rupa Rome MD  
[facility]) Appt Note:   
 [id number] [place], [address] [place] [address] [place]  
  
   
 [address], [address] Upcoming Health Maintenance Date Due Influenza Age 5 to Adult 8/1/2017 MICROALBUMIN Q1 1/27/2018 HEMOGLOBIN A1C Q6M 3/22/2018 LIPID PANEL Q1 9/22/2018 FOOT EXAM Q1 10/2/2018 MEDICARE YEARLY EXAM 10/3/2018 EYE EXAM RETINAL OR DILATED Q1 3/6/2019 GLAUCOMA SCREENING Q2Y 3/6/2020 COLONOSCOPY 7/17/2025 DTaP/Tdap/Td series (2 - Td) 2/6/2027 Allergies as of 4/24/2018  Review Complete On: 4/24/2018 By: Rupa Rome MD  
 No Known Allergies Current Immunizations  Reviewed on 10/2/2017 Name Date Influenza High Dose Vaccine PF 10/1/2016 Influenza Vaccine 10/1/2015, 10/1/2014, 11/1/2013, 10/1/2012 Influenza Vaccine Whole 9/1/2010 Pneumococcal Conjugate (PCV-13) 10/1/2015 Pneumococcal Polysaccharide (PPSV-23) 2/6/2017 TD Vaccine 1/1/2005 ZZZ-RETIRED (DO NOT USE) Pneumococcal Vaccine (Unspecified Type) 1/23/2008 Zoster 1/1/2008 Not reviewed this visit You Were Diagnosed With   
  
 Codes Comments Carotid stenosis, bilateral     ICD-10-CM: I65.23 ICD-9-CM: 433.10, 433.30 Vitals BP Pulse Temp Resp Height(growth percentile) Weight(growth percentile) 110/72 (BP 1 Location: Right arm, BP Patient Position: Sitting) (!) 56 98 °F (36.7 °C) (Oral) 14 5' 11\" (1.803 m) 174 lb (78.9 kg) SpO2 BMI Smoking Status 98% 24.27 kg/m2 Never Smoker Vitals History BMI and BSA Data Body Mass Index Body Surface Area  
 24.27 kg/m 2 1.99 m 2 Preferred Pharmacy Pharmacy Name Phone [facility] [address] [phone] Your Updated Medication List  
  
   
This list is accurate as of 4/24/18  8:45 AM.  Always use your most recent med list.  
  
  
  
  
 aspirin 81 mg tablet Take 81 mg by mouth daily. atorvastatin 80 mg tablet Commonly known as:  LIPITOR Take 1 Tab by mouth daily. cholecalciferol (VITAMIN D3) 5,000 unit Tab tablet Commonly known as:  VITAMIN D3 Take  by mouth daily. clopidogrel 75 mg Tab Commonly known as:  PLAVIX Take 1 Tab by mouth daily. COENZYME Q10 PO Take  by mouth daily. Comp. Stocking,Thigh,Long,X-Sml Misc  
1 Package by Does Not Apply route daily. 30-40 mm/hg  
  
 ezetimibe 10 mg tablet Commonly known as:  Min Spencer Take 1 Tab by mouth daily. GLUCOSAMINE-CHONDROITIN PO Take  by mouth daily. glucose blood VI test strips strip Commonly known as:  PRECISION XTRA TEST Pt to test blood sugar once daily. Dx: E11.9 Lancets Misc Use daily as directed  
  
 metFORMIN  mg tablet Commonly known as:  GLUCOPHAGE XR Take 1 Tab by mouth daily (with dinner). metoprolol succinate 25 mg XL tablet Commonly known as:  TOPROL XL Take 1 Tab by mouth daily. pantoprazole 40 mg tablet Commonly known as:  PROTONIX Take 1 Tab by mouth daily. Introducing [facility]! Dear Ranjan Stanford: Thank you for requesting a [employer] account. Our records indicate that you already have an active [employer] account. You can access your account anytime at [email or web address] Did you know that you can access your hospital and ER discharge instructions at any time in [employer]? You can also review all of your test results from your hospital stay or ER visit. Additional Information If you have questions, please visit the Frequently Asked Questions section of the [employer] website at [email or web address] Remember, [employer] is NOT to be used for urgent needs. For medical emergencies, dial 911. Now available from your iPhone and Android! Please provide this summary of care documentation to your next provider. Your primary care clinician is listed as Chilo Cardona. If you have any questions after today's visit, please call [phone].

## 2018-04-24 NOTE — PROGRESS NOTES
1. Have you been to the ER, urgent care clinic or hospitalized since your last visit? NO.     2. Have you seen or consulted any other health care providers outside of the [address] [place] [address] since your last visit (Include any pap smears or colon screening)? NO      Chief Complaint   Patient presents with    Cholesterol Problem     6 month follow up.  Pt stated he had labs done at [facility]

## 2018-04-24 NOTE — PROGRESS NOTES
75 y.o. white male who presents for evaluation. At the last visit, he was having some atypical cp. We empiorically treated w ppi and 'it worked, no more symptoms'. He's off the ppi    Denies any other cardiovascular complaints. He's walking 2 miles several times a week. He had CT abd/lung from Dr Jordana Irizarry in Sept which showed questionable lung nodule but f/u testing negative. We went over the testing in detail. He really feels well    Denies polyuria, polydipsia, nocturia, vision change. FBS in the sub 100 range     The neuropathy sx are about the same, sometimes he feels like he's stepping on ishan but not actual pain. No claudication as above    No urinary complaints. Wants prostate check today. CT above showed asymptomatic 2mm right sided non obst nephrolithiasis    LAST MEDICARE WELLNESS EXAM: 3/28/14, 4/10/15, 4/13/16, 10/2/17         ACP 10/2/17    Past Medical History:   Diagnosis Date    Atypical chest pain     2007, 2015    Cardiac catheterization 07/31/2014    LAD diffuse 20-30%. Prior pLAD stent patent. Otherwise < 20% coronary artery disease. LVEDP 10 mmHg. EF 55%. Minimal anteroapical hypk.  Cardiac echocardiogram 12/23/2013    EF 55-60%. No WMA. BRANDIE. Mild MR. Mild TR. No significant valvular heart disease.  Cardiac nuclear imaging test 12/23/2013    No evidence of ischemia or prior infarction. EF 63%. No RWMA. Neg EKG on max EST. Ex time 10 min 48 sec.  Carotid duplex 04/08/2016    Mild <50% bilateral ICA stenosis.       Colon polyps 2015    Dr Fatuma Dorsey [facility]    Diabetes mellitus ([facility] [address] 1/15    on basis of hba1c    Dyslipidemia     GERD (gastroesophageal reflux disease) 10/2017    Gilbert's syndrome     Gout 2002    Hypovitaminosis D 2012     Lung nodule 09/2017    on CT; questionable 8mm RUL, neg aneurysm; subsequent CT 3/18 showed no nodule, just atelectasis    Nephrolithiasis     ureteral stone 2004 Dr. Mitch Sen, lithotripsy Sandralee Eis 2005    Osteoarthritis     Peripheral neuropathy Dr. Massiel Recio, Dr. Ellen Stafford 10/11    Prediabetes     Venous insufficiency s/p laser vein ablation  [place] [employer] 7/12     left leg     Past Surgical History:   Procedure Laterality Date    ABDOMEN SURGERY [address] UNLISTED  10/2017    MRI abd neg    CARDIAC SURG PROCEDURE UNLIST  12/13    thallium negative, ef 63%    CARDIAC SURG PROCEDURE UNLIST  12/13    echo nl lv, ef 60%, mild MR, mild TR    CARDIAC SURG PROCEDURE UNLIST  05/2017    stress echo neg, ef 57% [facility] CARDIAC SURG PROCEDURE UNLIST  09/2017    NST neg, ef 57%    HX APPENDECTOMY      HX COLONOSCOPY      Dr Jasson Smith 1/05 negative; Dr Phan Michele polyps 7/15    HX CORONARY STENT PLACEMENT      severe 95% LAD disease stented to residual 0%.  HX GI  1/08    US abd negative    HX LITHOTRIPSY  07/05    HX ORTHOPAEDIC  2007    let meniscus tear Dr. Markos Sandhu [facility]      surgery for undescended testicles    VASCULAR SURGERY PROCEDURE UNLIST  2006    negative community screening for PAD/AAA    VASCULAR SURGERY PROCEDURE UNLIST  2008    <50% B ICA     Social History     Social History    Marital status:      Spouse name: N/A    Number of children: 2    Years of education: N/A     Occupational History     Retired     Social History Main Topics    Smoking status: Never Smoker    Smokeless tobacco: Never Used    Alcohol use Yes      Comment: rarely    Drug use: No    Sexual activity: Yes     Partners: Female      Comment: Wife     Other Topics Concern    Not on file     Social History Narrative     Current Outpatient Prescriptions   Medication Sig    atorvastatin (LIPITOR) 80 mg tablet Take 1 Tab by mouth daily.  metFORMIN ER (GLUCOPHAGE XR) 500 mg tablet Take 1 Tab by mouth daily (with dinner).  glucose blood VI test strips (PRECISION XTRA TEST) strip Pt to test blood sugar once daily. Dx: E11.9    ezetimibe (ZETIA) 10 mg tablet Take 1 Tab by mouth daily.     clopidogrel (PLAVIX) 75 mg tab Take 1 Tab by mouth daily.  metoprolol succinate (TOPROL XL) 25 mg XL tablet Take 1 Tab by mouth daily.  Lancets misc Use daily as directed    Cholecalciferol, Vitamin D3, 5,000 unit Tab Take  by mouth daily.  UBIDECARENONE (COENZYME Q10 PO) Take  by mouth daily.  aspirin 81 mg tablet Take 81 mg by mouth daily.  pantoprazole (PROTONIX) 40 mg tablet Take 1 Tab by mouth daily.  Comp. Stocking,Thigh,Long,X-Sml Misc 1 Package by Does Not Apply route daily. 30-40 mm/hg    GLUCOSAMINE HCL/CHONDRO PINEDA A (GLUCOSAMINE-CHONDROITIN PO) Take  by mouth daily. No current facility-administered medications for this visit. REVIEW OF SYSTEMS: sees Dr. Aguirre Cons 2015 Dr Tanja Alexander at [facility], no podiatry  Ophtho  no vision change or eye pain  Oral  no mouth pain, tongue or tooth problems  Ears  no hearing loss, ear pain, fullness  Throat  no swallowing problems  Cardiac  no CP, PND, orthopnea, edema, palpitations or syncope  Chest  no breast masses  Resp  no wheezing, chronic coughing, dyspnea  GI  no heartburn, nausea, vomiting, change in bowel habits, bleeding, hemorrhoids  Urinary  no dysuria, hematuria, flank pain, urgency, frequency  Constitutional  no wt loss, night sweats, unexplained fevers    Visit Vitals    /72 (BP 1 Location: Right arm, BP Patient Position: Sitting)    Pulse (!) 56    Temp 98 °F (36.7 °C) (Oral)    Resp 14    Ht 5' 11\" (1.803 m)    Wt 174 lb (78.9 kg)    SpO2 98%    BMI 24.27 kg/m2     A&O x3  Affect is appropriate. Mood stable  No apparent distress  Anicteric, no JVD, adenopathy or thyromegaly. No carotid bruits or radiated murmur  Lungs clear to auscultation, no wheezes or rales  No chest wall tenderness  Heart showed regular rate and rhythm. No murmur, rubs, gallops  Abdomen soft nontender, no hepatosplenomegaly or masses. Rectal showed guaiac neg brown stool, normal tone  Prostate about 30 gm, no asymmetry, nodularity, tenderness  Ext without c/c/e.   Dec soft touch and vibration bilat feet    LABS  From 1/11 showed    gluc 104, cr 0.90,             alt  38,                                   chol 173, tg 82, hdl 49, ldl-c 108, psa 0.90  From 7/11 showed                                 ck 48, javed 7.7  From 2/12 showed    gluc 108                               mma 116,    b12 409, fol 13.7, homo 8.9, nl esr, shannan neg, nl tsh, 2 hr gtt 98   From 4/12 showed        hba1c 6.2,                 chol 155, tg 93,   hdl 68, ldl-c 68  From 10/12 showed  gluc 98,   cr 0.80,             alt 29, hba1c 6.4  From 3/13 showed    gluc 89,   cr 0.80, gfr 91,  alt 14,                   ldl-p 867, chol 160, tg 50,   hdl 75, ldl-c 75,   psa 0.80  From 8/13 showed                                chol 167, tg 57,   hdl 79, ldl-c 77,  wbc 3.7, hb 13.1, plt 154, vit d 53  From 3/14 showed    gluc 103, cr 0.80, gfr 91,  alt 47, hba1c 6.2,               chol 164, tg 74,   hdl 74, ldl-c 72,  wbc 8.6, hb 14.0, plt 186  From 7/14 showed    gluc 89,   cr 0.94, gfr>60,     hba1c 6.4,               chol 157, tg 87,   hdl 74, ldl-c 67,  wbc 4.3, hb 13.4, plt 168, ck/trop neg  From 9/14 showed         hba1c 6.2,               psa 0.76,             ua neg  From 3/15 showed    gluc 105, cr 0.80,      alt 34, hba1c 6.4,    chol 175, tg 76,   hdl 78, ldl-c 82,   wbc 3.9, hb 14.5, plt 176  From 4/15 showed                     wbc 2.4,               plt 95  From 6/15 showed                     wbc 3.8, hb 14.4, plt 123, fe 90, %sat 30, feritin 334, b12 365, fol 14.7   From 9/15 showed         hba1c 6.3,    chol 165, tg 91,   hdl 63, ldl-c 84  From 1/16 showed    gluc 114, cr 0.80, gfr 90,  alt 63, hba1c 6.5,    chol 166, tg 100, hdl 73, ldl-c 73,  wbc 3.9, hb 14.5, plt 165  From 4/16 showed         hba1c 6.5,    chol 164, tg 61,   hdl 75, ldl-c 77,      umar 4.0  From 1/17 showed    gluc 101, cr 0.79, gfr>60, alt 33, hba1c 6.5,     chol 155, tg 70,   hdl 75, ldl-c 66,      umar 6.0  From 9/17 showed         hba1c 6.2,    chol 150, tg 77,   hdl 63, ldl-c 72,  wbc 3.5, hb 13.3, plt 160  From 4/18 showed    gluc 107, cr 0.83, gfr>60, alt 35, hba1c 6.2,            umar 9.5, psa 1.06    Patient Active Problem List   Diagnosis Code    Hyperlipidemia E78.5    Peripheral neuropathy Dr. Domenica Lees, Dr. Larisa Perez G62.9    Hypovitaminosis D 2012 E55.9    Chest pain, atypical R07.89    Arthritis, degenerative M19.90    CAD s/p LAD stent 2007 Dr Shayy García I25.10    Advance directive discussed with patient Z71.89    Colon polyp Dr Yvette Whatley 7/15 K63.5    Sinus bradycardia R00.1    Controlled type 2 diabetes mellitus with diabetic neuropathy, without long-term current use of insulin (HCC) E11.40    GERD without esophagitis K21.9     Assessment and plan:  1. CHD. F/U Dr. Shayy García, continue current regimen  2. Nephrolithiasis. Hydration  3. Neuropathy. F/U neurology as needed, stable  4. Hypovit D. Check level next draw  5. DM. Continue current regimen and doing well, f/u Dr Alycia Vincent  6. Dyslipidemia. Continue current regimen. 7.  GERD. Avoidance measures, ppi as needed      RTC 10/18    Above conditions discussed at length and patient vocalized understanding.   All questions answered to patient satifaction        TOTAL time 40 min spent of which at least 30 min was on counseling, answering questions and coordination of care

## 2018-07-17 ENCOUNTER — TELEPHONE (OUTPATIENT)
Dept: INTERNAL MEDICINE CLINIC | Age: 73
End: 2018-07-17

## 2018-07-17 DIAGNOSIS — E78.5 HYPERLIPIDEMIA, UNSPECIFIED HYPERLIPIDEMIA TYPE: Primary | ICD-10-CM

## 2018-07-17 RX ORDER — ATORVASTATIN CALCIUM 80 MG/1
80 TABLET, FILM COATED ORAL DAILY
Qty: 14 TAB | Refills: 1 | Status: SHIPPED | OUTPATIENT
Start: 2018-07-17 | End: 2018-09-25 | Stop reason: SDUPTHER

## 2018-07-17 NOTE — TELEPHONE ENCOUNTER
Patient is out of town in [address] because his wife had to have emergency surgery.  He is asking for 10 tabs of the Atorvastatin be called in to the [facility] on file in [facility]

## 2018-07-17 NOTE — TELEPHONE ENCOUNTER
Pt called about prev message concerning getting a few pills of atorvastatin sent to a pharmacy in [place] where his wife had [address]

## 2018-07-24 ENCOUNTER — HOSPITAL ENCOUNTER (OUTPATIENT)
Dept: LAB | Age: 73
Discharge: HOME | End: 2018-07-24
Payer: MEDICARE

## 2018-07-24 LAB
ANION GAP SERPL CALC-SCNC: 7 MMOL/L (ref 3–18)
BUN SERPL-MCNC: 19 MG/DL (ref 7–18)
BUN/CREAT SERPL: 18 (ref 12–20)
CALCIUM SERPL-MCNC: 8.4 MG/DL (ref 8.5–10.1)
CHLORIDE SERPL-SCNC: 102 MMOL/L (ref 100–108)
CO2 SERPL-SCNC: 32 MMOL/L (ref 21–32)
CREAT SERPL-MCNC: 1.04 MG/DL (ref 0.6–1.3)
GLUCOSE SERPL-MCNC: 110 MG/DL (ref 74–99)
POTASSIUM SERPL-SCNC: 4.3 MMOL/L (ref 3.5–5.5)
SODIUM SERPL-SCNC: 141 MMOL/L (ref 136–145)

## 2018-07-24 PROCEDURE — 36415 COLL VENOUS BLD VENIPUNCTURE: CPT | Performed by: PLASTIC SURGERY

## 2018-07-24 PROCEDURE — 80048 BASIC METABOLIC PNL TOTAL CA: CPT | Performed by: PLASTIC SURGERY

## 2018-07-31 ENCOUNTER — HOSPITAL ENCOUNTER (OUTPATIENT)
Dept: LAB | Age: 73
Discharge: HOME | End: 2018-07-31
Payer: MEDICARE

## 2018-07-31 PROCEDURE — 88302 TISSUE EXAM BY PATHOLOGIST: CPT | Performed by: PLASTIC SURGERY

## 2018-08-31 DIAGNOSIS — I25.10 CORONARY ARTERY DISEASE INVOLVING NATIVE CORONARY ARTERY OF NATIVE HEART WITHOUT ANGINA PECTORIS: ICD-10-CM

## 2018-08-31 RX ORDER — METOPROLOL SUCCINATE 25 MG/1
25 TABLET, EXTENDED RELEASE ORAL DAILY
Qty: 90 TAB | Refills: 3 | Status: SHIPPED | OUTPATIENT
Start: 2018-08-31 | End: 2019-08-28 | Stop reason: SDUPTHER

## 2018-08-31 NOTE — TELEPHONE ENCOUNTER
Last Visit: 04/24/2018 with MD Suzanne Riddle    Next Appointment: 10/17/2018 with MD Suzanne Riddle     Requested Prescriptions     Pending Prescriptions Disp Refills    metoprolol succinate (TOPROL XL) 25 mg XL tablet 90 Tab 3     Sig: Take 1 Tab by mouth daily.

## 2018-09-07 RX ORDER — METFORMIN HYDROCHLORIDE 500 MG/1
500 TABLET, EXTENDED RELEASE ORAL
Qty: 90 TAB | Refills: 3 | Status: SHIPPED | OUTPATIENT
Start: 2018-09-07 | End: 2019-08-28 | Stop reason: SDUPTHER

## 2018-09-07 NOTE — TELEPHONE ENCOUNTER
Last Visit: 04/24/2018 with MD Kathy العراقي    Next Appointment: 10/17/2018 with MD Kathy العراقي   Previous Refill Encounters: 08/14/2017 per MD Kathy العراقي #[phone] with 3 refills     Requested Prescriptions     Pending Prescriptions Disp Refills    metFORMIN ER (GLUCOPHAGE XR) 500 mg tablet 90 Tab 3     Sig: Take 1 Tab by mouth daily (with dinner).

## 2018-09-07 NOTE — TELEPHONE ENCOUNTER
Pt says he picked up rx yesterday but it was for the wrong med was supposed to be Metformin. Needs sent asap so he can go . Wants call when sent.

## 2018-09-25 ENCOUNTER — OFFICE VISIT (OUTPATIENT)
Dept: CARDIOLOGY CLINIC | Age: 73
End: 2018-09-25

## 2018-09-25 VITALS
WEIGHT: 152 LBS | OXYGEN SATURATION: 97 % | HEIGHT: 71 IN | SYSTOLIC BLOOD PRESSURE: 108 MMHG | DIASTOLIC BLOOD PRESSURE: 70 MMHG | HEART RATE: 61 BPM | BODY MASS INDEX: 21.28 KG/M2

## 2018-09-25 DIAGNOSIS — E11.40 CONTROLLED TYPE 2 DIABETES MELLITUS WITH DIABETIC NEUROPATHY, WITHOUT LONG-TERM CURRENT USE OF INSULIN (HCC): ICD-10-CM

## 2018-09-25 DIAGNOSIS — E78.5 HYPERLIPIDEMIA, UNSPECIFIED HYPERLIPIDEMIA TYPE: ICD-10-CM

## 2018-09-25 DIAGNOSIS — I25.10 CORONARY ARTERY DISEASE INVOLVING NATIVE CORONARY ARTERY OF NATIVE HEART WITHOUT ANGINA PECTORIS: Primary | ICD-10-CM

## 2018-09-25 DIAGNOSIS — R00.1 SINUS BRADYCARDIA: ICD-10-CM

## 2018-09-25 NOTE — PROGRESS NOTES
Baldemar Villa presents today for   Chief Complaint   Patient presents with    Coronary Artery Disease     1 year follow up - no cardiac complaints        Baldemar Villa preferred language for health care discussion is english/other. Is someone accompanying this pt? No     Is the patient using any DME equipment during OV? No     Depression Screening:  PHQ over the last two weeks 4/24/2018   Little interest or pleasure in doing things Not at all   Feeling down, depressed, irritable, or hopeless Not at all   Total Score PHQ 2 0       Learning Assessment:  Learning Assessment 9/12/2017   PRIMARY LEARNER Patient   PRIMARY LANGUAGE ENGLISH   LEARNER PREFERENCE PRIMARY DEMONSTRATION   ANSWERED BY Patient   RELATIONSHIP SELF       Abuse Screening:  Abuse Screening Questionnaire 4/24/2018   Do you ever feel afraid of your partner? N   Are you in a relationship with someone who physically or mentally threatens you? N   Is it safe for you to go home? Y       Fall Risk  Fall Risk Assessment, last 12 mths 4/24/2018   Able to walk? Yes   Fall in past 12 months? No   Fall with injury? -   Number of falls in past 12 months -       Pt currently taking Anticoagulant therapy? Plavix and ASA 81mg daily     Coordination of Care:  1. Have you been to the ER, urgent care clinic since your last visit? Hospitalized since your last visit? No     2. Have you seen or consulted any other health care providers outside of the [address] [place] [address] since your last visit? Include any pap smears or colon screening.  No

## 2018-09-25 NOTE — PROGRESS NOTES
HISTORY OF PRESENT ILLNESS  Magui Lamar is a 75 y.o. male. ASSESSMENT and PLAN    Mr. Vishnu Lopez has history of CAD. He has never had chest pains or angina equivalent. Back in 2463, he had 64 slice CT scan for unclear reasons. This revealed significant calcification. He subsequently underwent evaluation for CAD despite the fact that he had no symptoms. His evaluation revealed severe LAD disease. He subsequently had LAD stent performed in 2007 and has done fairly well since that time. He remains active physically. Because of recurrent episodes of chest pain, he was read[facility] to [facility]. This was in July of 2014 and he subsequent underwent repeat coronary angiography which revealed patent LAD stent, without significant, occlusive CAD. Reassurance was given. He presented to the hospital in [place] with abdominal discomfort.  He was concerned about possible coronary syndrome.  This was in early part of May 2017. [facility] ruled out. [facility] had a stress echocardiogram which was reported to him as normal.  In September 2017, he presented to [facility] emergency room with atypical chest pains. They ruled out acute coronary event and subsequently discharge the patient home. He has had multiple presentations to the emergency room for atypical chest pains over the last 2 years. Because of atypical chest pains, he had nuclear scan performed in September 2017. This showed normal perfusion with EF of 59%. Because of some atypical abdominal pains, he had chest CT back in March 2018. This does not show any significant abnormalities.  CAD:   Symptomatically stable. He remains active physically. His nuclear scan from September 2017 was unremarkable. His ejection fraction was maintained at 59%.  BP:   Well-controlled.  HR:    Stable.  CHF:   Currently, there is no evidence of decompensated CHF noted.  Weight:   His weight today is 152 pounds. His baseline weight is 169 pounds.   I will defer this to you. He should not lose any further weight.  Cholesterol:   Target LDL <70. Lipitor 80 and Zetia 10.  Anti-platelet:   Remains on ASA. I will see him back annually. Thank you. Encounter Diagnoses   Name Primary?  Coronary artery disease involving native coronary artery of native heart without angina pectoris Yes    Sinus bradycardia     Hyperlipidemia, unspecified hyperlipidemia type     Controlled type 2 diabetes mellitus with diabetic neuropathy, without long-term current use of insulin (HCC)      current treatment plan is effective, no change in therapy  lab results and schedule of future lab studies reviewed with patient  reviewed diet, exercise and weight control  cardiovascular risk and specific lipid/LDL goals reviewed  use of aspirin to prevent MI and TIA's discussed      HPI  Today, Mr. Aurea Ford has no complaints of chest pains, increased shortness of breath or decreased exercise capacity. He denies any orthopnea or PND. He denies any palpitations or dizziness. Unfortunately, over the last 12 months, he has lost about 15 pounds. He does not think that he has been dieting. Review of Systems   Constitutional: Positive for weight loss. Respiratory: Negative for shortness of breath. Cardiovascular: Negative for chest pain, palpitations, orthopnea, claudication, leg swelling and PND. All other systems reviewed and are negative. Physical Exam   Constitutional: He is oriented to person, place, and time. He appears well-developed and well-nourished. HENT:   Head: Normocephalic. Eyes: Conjunctivae are normal.   Neck: Neck supple. No JVD present. Carotid bruit is not present. No thyromegaly present. Cardiovascular: Normal rate and regular rhythm. Pulmonary/Chest: Breath sounds normal.   Abdominal: Bowel sounds are normal.   Musculoskeletal: He exhibits no edema. Neurological: He is alert and oriented to person, place, and time. Skin: Skin is warm and dry. Nursing note and vitals reviewed. PCP: Ana Kirby MD    Past Medical History:   Diagnosis Date    Atypical chest pain     2007, 2015    Cardiac catheterization 07/31/2014    LAD diffuse 20-30%. Prior pLAD stent patent. Otherwise < 20% coronary artery disease. LVEDP 10 mmHg. EF 55%. Minimal anteroapical hypk.  Cardiac echocardiogram 12/23/2013    EF 55-60%. No WMA. BRANDIE. Mild MR. Mild TR. No significant valvular heart disease.  Cardiac nuclear imaging test 12/23/2013    No evidence of ischemia or prior infarction. EF 63%. No RWMA. Neg EKG on max EST. Ex time 10 min 48 sec.  Carotid duplex 04/08/2016    Mild <50% bilateral ICA stenosis.  Colon polyps 2015    Dr Marcos Bryant [facility]    Diabetes mellitus ([facility] [address] 1/15    on basis of hba1c    Dyslipidemia     GERD (gastroesophageal reflux disease) 10/2017    Gilbert's syndrome     Gout 2002    Hypovitaminosis D 2012     Lung nodule 09/2017    on CT; questionable 8mm RUL, neg aneurysm; subsequent CT 3/18 showed no nodule, just atelectasis    Nephrolithiasis     ureteral stone 2004 Dr. Nhaomi Norman, lithotripsy [facility] Lackey 2005    Osteoarthritis     Peripheral neuropathy Dr. Tammy Jorgensen,  Ila Drivers 10/11    Prediabetes     Venous insufficiency s/p laser vein ablation Dr. Leonardo Gomez 7/12     left leg       Past Surgical History:   Procedure Laterality Date    ABDOMEN SURGERY [address] UNLISTED  10/2017    MRI abd neg    CARDIAC SURG PROCEDURE UNLIST  12/13    thallium negative, ef 63%    CARDIAC SURG PROCEDURE UNLIST  12/13    echo nl lv, ef 60%, mild MR, mild TR    CARDIAC SURG PROCEDURE UNLIST  05/2017    stress echo neg, ef 57% [facility] CARDIAC SURG PROCEDURE UNLIST  09/2017    NST neg, ef 57%    HX APPENDECTOMY      HX COLONOSCOPY      Dr Libby Cheung 1/05 negative; Dr Derrick Wong polyps 7/15    HX CORONARY STENT PLACEMENT      severe 95% LAD disease stented to residual 0%.     HX GI  1/08    US abd negative    HX LITHOTRIPSY  07/05    HX ORTHOPAEDIC 2007    let meniscus tear Dr. Umana Hews [facility]      surgery for undescended testicles    VASCULAR SURGERY PROCEDURE UNLIST  2006    negative community screening for PAD/AAA    VASCULAR SURGERY PROCEDURE UNLIST  2008    <50% B ICA       Current Outpatient Prescriptions   Medication Sig Dispense Refill    metFORMIN ER (GLUCOPHAGE XR) 500 mg tablet Take 1 Tab by mouth daily (with dinner). 90 Tab 3    metoprolol succinate (TOPROL XL) 25 mg XL tablet Take 1 Tab by mouth daily. 90 Tab 3    clopidogrel (PLAVIX) 75 mg tab Take 1 Tab by mouth daily. 90 Tab 3    ezetimibe (ZETIA) 10 mg tablet Take 1 Tab by mouth daily. 90 Tab 3    glucose blood VI test strips (PRECISION XTRA TEST) strip Pt to test blood sugar once daily. Dx: E11.9 100 Strip 3    Lancets misc Use daily as directed 1 Each 11    pantoprazole (PROTONIX) 40 mg tablet Take 1 Tab by mouth daily. 90 Tab 3    atorvastatin (LIPITOR) 80 mg tablet Take 1 Tab by mouth daily. 90 Tab 3    Cholecalciferol, Vitamin D3, 5,000 unit Tab Take  by mouth daily.  Comp. Stocking,Thigh,Long,X-Sml Misc 1 Package by Does Not Apply route daily. 30-40 mm/hg 1 Package 1    GLUCOSAMINE HCL/CHONDRO PINEDA A (GLUCOSAMINE-CHONDROITIN PO) Take  by mouth daily.  UBIDECARENONE (COENZYME Q10 PO) Take  by mouth daily.  aspirin 81 mg tablet Take 81 mg by mouth daily.          The patient has a family history of    Social History   Substance Use Topics    Smoking status: Never Smoker    Smokeless tobacco: Never Used    Alcohol use Yes      Comment: rarely       Lab Results   Component Value Date/Time    Cholesterol, total 157 07/30/2014 04:14 AM    HDL Cholesterol 74 (H) 07/30/2014 04:14 AM    LDL, calculated 65.6 07/30/2014 04:14 AM    Triglyceride 87 07/30/2014 04:14 AM    CHOL/HDL Ratio 2.1 07/30/2014 04:14 AM        BP Readings from Last 3 Encounters:   09/25/18 108/70   04/24/18 110/72   10/02/17 102/70        Pulse Readings from Last 3 Encounters: 09/25/18 61   04/24/18 (!) 56   10/02/17 66       Wt Readings from Last 3 Encounters:   09/25/18 68.9 kg (152 lb)   04/24/18 78.9 kg (174 lb)   10/02/17 76.7 kg (169 lb)         EKG: unchanged from previous tracings, normal sinus rhythm, RBBB.

## 2018-09-25 NOTE — MR AVS SNAPSHOT
[address] [address] [address] [address] [phone] Patient: Steph Kevin MRN: [id number] XZA:[birth year] Visit Information Date & Time Provider Department Dept. Phone Encounter #  
 9/25/2018  8:00 AM Tori Moore MD [facility] [address] [id number] Your Appointments 10/17/2018 10:00 AM  
Office Visit with Estrella Rhoades MD  
[facility] [address]) Appt Note:   
 [address] [place], [address] [address] [address] [place]  
  
   
 [address], [address] Upcoming Health Maintenance Date Due Shingrix Vaccine Age 50> (1 of 2) 3/11/1994 Influenza Age 5 to Adult 8/1/2018 LIPID PANEL Q1 9/22/2018 HEMOGLOBIN A1C Q6M 10/17/2018 MEDICARE YEARLY EXAM 10/3/2018 EYE EXAM RETINAL OR DILATED Q1 3/6/2019 MICROALBUMIN Q1 4/17/2019 GLAUCOMA SCREENING Q2Y 3/6/2020 COLONOSCOPY 7/17/2025 DTaP/Tdap/Td series (2 - Td) 2/6/2027 Allergies as of 9/25/2018  Review Complete On: 4/24/2018 By: Estrella Rhoades MD  
 No Known Allergies Current Immunizations  Reviewed on 4/24/2018 Name Date Influenza High Dose Vaccine PF 10/1/2017, 10/1/2016 Influenza Vaccine 10/1/2015, 10/1/2014, 11/1/2013, 10/1/2012 Influenza Vaccine Whole 9/1/2010 Pneumococcal Conjugate (PCV-13) 10/1/2015 Pneumococcal Polysaccharide (PPSV-23) 2/6/2017 TD Vaccine 1/1/2005 ZZZ-RETIRED (DO NOT USE) Pneumococcal Vaccine (Unspecified Type) 1/23/2008 Zoster 1/1/2008 Not reviewed this visit You Were Diagnosed With   
  
 Codes Comments Sinus bradycardia    -  Primary ICD-10-CM: R00.1 ICD-9-CM: 427.89 Coronary artery disease involving native coronary artery of native heart without angina pectoris     ICD-10-CM: I25.10 ICD-9-CM: 414.01   
 Hyperlipidemia, unspecified hyperlipidemia type     ICD-10-CM: E78.5 ICD-9-CM: 272.4 Controlled type 2 diabetes mellitus with diabetic neuropathy, without long-term current use of insulin (HCC)     ICD-10-CM: E11.40 ICD-9-CM: 250.60, 357.2 Vitals BP Pulse Height(growth percentile) Weight(growth percentile) SpO2 BMI  
 108/70 61 5' 11\" (1.803 m) 152 lb (68.9 kg) 97% 21.2 kg/m2 Smoking Status Never Smoker Vitals History BMI and BSA Data Body Mass Index Body Surface Area  
 21.2 kg/m 2 1.86 m 2 Preferred Pharmacy Pharmacy Name Phone Cres[facility] [address] [phone] Your Updated Medication List  
  
   
This list is accurate as of 9/25/18  8:40 AM.  Always use your most recent med list.  
  
  
  
  
 aspirin 81 mg tablet Take 81 mg by mouth daily. atorvastatin 80 mg tablet Commonly known as:  LIPITOR Take 1 Tab by mouth daily. cholecalciferol (VITAMIN D3) 5,000 unit Tab tablet Commonly known as:  VITAMIN D3 Take  by mouth daily. clopidogrel 75 mg Tab Commonly known as:  PLAVIX Take 1 Tab by mouth daily. COENZYME Q10 PO Take  by mouth daily. Comp. Stocking,Thigh,Long,X-Sml Misc  
1 Package by Does Not Apply route daily. 30-40 mm/hg  
  
 ezetimibe 10 mg tablet Commonly known as:  Ronnie Och Take 1 Tab by mouth daily. GLUCOSAMINE-CHONDROITIN PO Take  by mouth daily. glucose blood VI test strips strip Commonly known as:  PRECISION XTRA TEST Pt to test blood sugar once daily. Dx: E11.9 Lancets Misc Use daily as directed  
  
 metFORMIN  mg tablet Commonly known as:  GLUCOPHAGE XR Take 1 Tab by mouth daily (with dinner). metoprolol succinate 25 mg XL tablet Commonly known as:  TOPROL XL Take 1 Tab by mouth daily. pantoprazole 40 mg tablet Commonly known as:  PROTONIX Take 1 Tab by mouth daily. We Performed the Following AMB POC EKG ROUTINE W/ 12 LEADS, INTER & REP [10120 CPT(R)] Introducing [facility]! Dear Nils Hu: Thank you for requesting a [employer] account. Our records indicate that you already have an active [employer] account. You can access your account anytime at [email or web address] Did you know that you can access your hospital and ER discharge instructions at any time in [employer]? You can also review all of your test results from your hospital stay or ER visit. Additional Information If you have questions, please visit the Frequently Asked Questions section of the [employer] website at [email or web address] Remember, [employer] is NOT to be used for urgent needs. For medical emergencies, dial 911. Now available from your iPhone and Android! Please provide this summary of care documentation to your next provider. Your primary care clinician is listed as Karol Rouse. If you have any questions after today's visit, please call [phone].

## 2018-09-28 DIAGNOSIS — E78.5 HYPERLIPIDEMIA, UNSPECIFIED HYPERLIPIDEMIA TYPE: ICD-10-CM

## 2018-09-28 RX ORDER — ATORVASTATIN CALCIUM 80 MG/1
80 TABLET, FILM COATED ORAL DAILY
Qty: 90 TAB | Refills: 3 | Status: SHIPPED | OUTPATIENT
Start: 2018-09-28 | End: 2019-09-27 | Stop reason: SDUPTHER

## 2018-09-28 NOTE — TELEPHONE ENCOUNTER
Last Visit: 04/24/2018 with MD Lizandro Pompa    Next Appointment: 10/17/2018 with MD Lizandro Pompa   Previous Refill Encounters: 10/02/2017 per MD Lizandro Pompa #[phone] with 3 refills     Requested Prescriptions     Pending Prescriptions Disp Refills    atorvastatin (LIPITOR) 80 mg tablet 90 Tab 3     Sig: Take 1 Tab by mouth daily.

## 2018-10-14 NOTE — ACP (ADVANCE CARE PLANNING)
Advance Care Planning    Advance Care Planning (ACP) Provider Note - Comprehensive     Date of ACP Conversation: 10/17/18  Persons included in Conversation:  patient  Length of ACP Conversation in minutes:  16 minutes    Authorized Decision Maker (if patient is incapable of making informed decisions): This person is: wife  Healthcare Agent/Medical Power of  under Advance Directive          General ACP for ALL Patients with Decision Making Capacity:   Importance of advance care planning, including choosing a healthcare agent to communicate patient's healthcare decisions if patient lost the ability to make decisions, such as after a sudden illness or accident  Understanding of the healthcare agent role was assessed and information provided  Exploration of values, goals, and preferences if recovery is not expected, even with continued medical treatment in the event of: Imminent death  Severe, permanent brain injury    Review of Existing Advance Directive:  Does this advance directive still reflect your preferences? Yes (Provide new form/Refer for assistance in updating)    For Serious or Chronic Illness:  Understanding of medical condition    Understanding of CPR, goals and expected outcomes, benefits and burdens discussed.     Interventions Provided:  Recommended completion of Advance Directive form after review of ACP materials and conversation with prospective healthcare agent   Recommended communicating the plan and making copies for the healthcare agent, personal physician, and others as appropriate (e.g., health system)  Recommended review of completed ACP document annually or upon change in health status

## 2018-10-14 NOTE — PROGRESS NOTES
This is a Subsequent [facility] Wellness Visit I have reviewed the patient's medical history in detail and updated the computerized patient record. History Past Medical History:  
Diagnosis Date  Atypical chest pain 2007, 2015  Cardiac catheterization 07/31/2014 LAD diffuse 20-30%. Prior pLAD stent patent. Otherwise < 20% coronary artery disease. LVEDP 10 mmHg. EF 55%. Minimal anteroapical hypk.  Cardiac echocardiogram 12/23/2013 EF 55-60%. No WMA. BRANDIE. Mild MR. Mild TR. No significant valvular heart disease.  Cardiac nuclear imaging test 12/23/2013 No evidence of ischemia or prior infarction. EF 63%. No RWMA. Neg EKG on max EST. Ex time 10 min 48 sec.  Carotid duplex 04/08/2016 Mild <50% bilateral ICA stenosis.  Colon polyps 2015 Dr Camarena [facility]  Diabetes mellitus ([facility] [address] 1/15 on basis of hba1c  Dyslipidemia  GERD (gastroesophageal reflux disease) 10/2017  Gilbert's syndrome  Gout 2002  Hypovitaminosis D 2012  Lung nodule 09/2017  
 on CT; questionable 8mm RUL, neg aneurysm; subsequent CT 3/18 showed no nodule, just atelectasis  Nephrolithiasis   
 ureteral stone 2004 Dr. Jalen Reyes, lithotripsy [facility] 2005  Osteoarthritis  Peripheral neuropathy Dr. Shawn Johnson, Dr. Francesco Shipley 10/11  Prediabetes  Venous insufficiency s/p laser vein ablation Dr. Cindy Hamilton 7/12   
 left leg Past Surgical History:  
Procedure Laterality Date  ABDOMEN SURGERY PROC UNLISTED  10/2017 MRI abd neg  CARDIAC SURG PROCEDURE UNLIST  12/13  
 thallium negative, ef 63%  CARDIAC SURG PROCEDURE UNLIST  12/13  
 echo nl lv, ef 60%, mild MR, mild TR  
 CARDIAC SURG PROCEDURE UNLIST  05/2017  
 stress echo neg, ef 57% [facility]  CARDIAC SURG PROCEDURE UNLIST  09/2017 NST neg, ef 57%  HX APPENDECTOMY  HX COLONOSCOPY Dr Nicholas Fuller 1/05 negative; Dr Duarte Reason polyps 7/15 [facility] severe 95% LAD disease stented to residual 0%.  HX GI    
 US abd negative 1/08; MRI abd neg 10/17  HX LITHOTRIPSY  07/05  HX ORTHOPAEDIC  2007  
 let meniscus tear Dr. Vivien Field  HX OTHER SURGICAL    
 surgery for undescended testicles  VASCULAR SURGERY PROCEDURE UNLIST  2006  
 negative community screening for PAD/AAA  VASCULAR SURGERY PROCEDURE UNLIST  2008  
 <50% B ICA Current Outpatient Medications Medication Sig Dispense Refill  atorvastatin (LIPITOR) 80 mg tablet Take 1 Tab by mouth daily. 90 Tab 3  
 metFORMIN ER (GLUCOPHAGE XR) 500 mg tablet Take 1 Tab by mouth daily (with dinner). 90 Tab 3  
 metoprolol succinate (TOPROL XL) 25 mg XL tablet Take 1 Tab by mouth daily. 90 Tab 3  clopidogrel (PLAVIX) 75 mg tab Take 1 Tab by mouth daily. 90 Tab 3  
 ezetimibe (ZETIA) 10 mg tablet Take 1 Tab by mouth daily. 90 Tab 3  
 glucose blood VI test strips (PRECISION XTRA TEST) strip Pt to test blood sugar once daily. Dx: E11.9 100 Strip 3  Lancets misc Use daily as directed 1 Each 11  Cholecalciferol, Vitamin D3, 5,000 unit Tab Take  by mouth daily.  UBIDECARENONE (COENZYME Q10 PO) Take  by mouth daily.  aspirin 81 mg tablet Take 81 mg by mouth daily.  pantoprazole (PROTONIX) 40 mg tablet Take 1 Tab by mouth daily. (Patient taking differently: Take 40 mg by mouth daily.) 90 Tab 3  
 Comp. Stocking,Thigh,Long,X-Sml Misc 1 Package by Does Not Apply route daily. 30-40 mm/hg (Patient taking differently: 1 Package by Does Not Apply route daily. 30-40 mm/hg) 1 Package 1  
 GLUCOSAMINE HCL/CHONDRO PINEDA A (GLUCOSAMINE-CHONDROITIN PO) Take  by mouth daily. No Known Allergies Family History Problem Relation Age of Onset  Heart Disease Father  Arthritis-rheumatoid Mother Social History Tobacco Use  Smoking status: Never Smoker  Smokeless tobacco: Never Used Substance Use Topics  Alcohol use: Yes Comment: rarely Depression Risk Factor Screening: PHQ over the last two weeks 10/17/2018 Little interest or pleasure in doing things Not at all Feeling down, depressed, irritable, or hopeless Not at all Total Score PHQ 2 0 SCREENINGS Colonoscopy last done 2015 Dr Pretty Causey at [address] Prostate ROSY done 4/18 Immunization History Administered Date(s) Administered  Influenza High Dose Vaccine PF 10/01/2016, 10/01/2017  Influenza Vaccine 10/01/2012, 11/01/2013, 10/01/2014, 10/01/2015  Influenza Vaccine (Tri) Adjuvanted 10/17/2018  Influenza Vaccine Whole 09/01/2010  Pneumococcal Conjugate (PCV-13) 10/01/2015  Pneumococcal Polysaccharide (PPSV-23) 02/06/2017  TD Vaccine 01/01/2005  ZZZ-RETIRED (DO NOT USE) Pneumococcal Vaccine (Unspecified Type) 01/23/2008  Zoster 01/01/2008 Alcohol Risk Factor Screening: On any occasion during the past 3 months, have you had more than 4 drinks containing alcohol? No 
 
Do you average more than 14 drinks per week? No 
 
 
Functional Ability and Level of Safety:  
 
Hearing Loss  
normal-to-mild Vision - no acute complaints and is followed by Dr. Shubham Zavaleta Activities of Daily Living Self-care. Requires assistance with: no ADLs Fall Risk Fall Risk Assessment, last 12 mths 10/17/2018 Able to walk? Yes Fall in past 12 months? No  
Fall with injury? -  
Number of falls in past 12 months -  
 
Abuse Screen Patient is not abused Review of Systems A comprehensive review of systems was negative except for that written in the HPI. Physical Examination Visit Vitals /64 Pulse (!) 54 Temp 98.2 °F (36.8 °C) (Oral) Resp 14 Ht 5' 11\" (1.803 m) Wt 169 lb (76.7 kg) SpO2 96% BMI 23.57 kg/m² Evaluation of Cognitive Function: 
Mood/affect:  neutral 
Appearance: age appropriate, well dressed and within normal Limits Family member/caregiver input: na 
 
Patient Care Team: Aydee Smith MD as PCP - General (Internal Medicine) Mandy Hearn, RN as Ambulatory Care Navigator (Internal Medicine) Deanna Malik MD (Cardiology) Advice/Referrals/Counseling Education and counseling provided: 
Are appropriate based on today's review and evaluation End-of-Life planning (with patient's consent) Pneumococcal Vaccine Influenza Vaccine Prostate cancer screening tests (PSA, covered annually) Colorectal cancer screening tests Cardiovascular screening blood test 
 
Assessment/Plan ICD-10-CM ICD-9-CM 1. Medicare annual wellness visit, subsequent Z00.00 V70.0 2. Advanced directives, counseling/discussion Z71.89 V65.49 ADVANCE CARE PLANNING FIRST 30 MINS 3. Screening for alcoholism Z13.39 V79.1 [place] ANNUAL ALCOHOL SCREEN 15 MIN 4. Screening for depression Z13.31 V79.0 [address] 5. Screen for colon cancer Z12.11 V76.51   
6. Screening for diabetes mellitus Z13.1 V77.1 7. Screening for ischemic heart disease Z13.6 V81.0 8. Special screening for malignant neoplasm of prostate Z12.5 V76.44 [place] PROSTATE CA SCREENING; ROSY  
9. Encounter for immunization Z23 V03.89 INFLUENZA VACCINE INACTIVATED (IIV), SUBUNIT, ADJUVANTED, IM  
   ADMIN INFLUENZA VIRUS VAC 10. Hyperlipidemia, unspecified hyperlipidemia type E78.5 272.4 11. Coronary artery disease involving native coronary artery of native heart without angina pectoris I25.10 414.01   
12. Controlled type 2 diabetes mellitus with diabetic neuropathy, without long-term current use of insulin (HCC) E11.40 250.60   
  357.2 13. GERD without esophagitis K21.9 530.81   
14. Hypovitaminosis D 2012 E55.9 268.9 15. Peripheral polyneuropathy G62.9 356.9   
 
current treatment plan is effective 
lab results and schedule of future lab studies reviewed with patient. End of life discussion undertaken. amd on file Colonoscopy to be scheduled 2025?

## 2018-10-14 NOTE — PROGRESS NOTES
75 y.o. white male who presents for evaluation. No cardiovascular complaints and walking 2 miles several times a week. Denies polyuria, polydipsia, nocturia, vision change. FBS in the 100-120 range No gi or gu issues The neuropathy sx are about the same LAST MEDICARE WELLNESS EXAM: 3/28/14, 4/10/15, 4/13/16, 10/2/17, 10/17/18 Past Medical History:  
Diagnosis Date  Atypical chest pain 2007, 2015  Cardiac catheterization 07/31/2014 LAD diffuse 20-30%. Prior pLAD stent patent. Otherwise < 20% coronary artery disease. LVEDP 10 mmHg. EF 55%. Minimal anteroapical hypk.  Cardiac echocardiogram 12/23/2013 EF 55-60%. No WMA. BRANDIE. Mild MR. Mild TR. No significant valvular heart disease.  Cardiac nuclear imaging test 12/23/2013 No evidence of ischemia or prior infarction. EF 63%. No RWMA. Neg EKG on max EST. Ex time 10 min 48 sec.  Carotid duplex 04/08/2016 Mild <50% bilateral ICA stenosis.  Colon polyps 2015 Dr Timothy Goodwin [facility]  Diabetes mellitus ([facility] [address] 1/15 on basis of hba1c  Dyslipidemia  GERD (gastroesophageal reflux disease) 10/2017  Gilbert's syndrome  Gout 2002  Hypovitaminosis D 2012  Lung nodule 09/2017  
 on CT; questionable 8mm RUL, neg aneurysm; subsequent CT 3/18 showed no nodule, just atelectasis  Nephrolithiasis   
 ureteral stone 2004 Dr. Jose Porras, lithotripsy [facility] 2005  Osteoarthritis  Peripheral neuropathy Dr. Amaris Silveira, Dr. Titus August 10/11  Prediabetes  Venous insufficiency s/p laser vein ablation Dr. Falk [facility] 7/12   
 left leg Past Surgical History:  
Procedure Laterality Date  ABDOMEN SURGERY PROC UNLISTED  10/2017 MRI abd neg  CARDIAC SURG PROCEDURE UNLIST  12/13  
 thallium negative, ef 63%  CARDIAC SURG PROCEDURE UNLIST  12/13  
 echo nl lv, ef 60%, mild MR, mild TR  
 CARDIAC SURG PROCEDURE UNLIST  05/2017  
 stress echo neg, ef 57% [facility]  CARDIAC SURG PROCEDURE UNLIST  09/2017 NST neg, ef 57%  HX APPENDECTOMY  HX COLONOSCOPY Dr Marybeth Cherry 1/05 negative; Dr Benja Taylor polyps 7/15  HX CORONARY STENT PLACEMENT    
 severe 95% LAD disease stented to residual 0%.  HX GI    
 US abd negative 1/08; MRI abd neg 10/17  HX LITHOTRIPSY  07/05  HX ORTHOPAEDIC  2007  
 let meniscus tear Dr. Adam Dance  HX OTHER SURGICAL    
 surgery for undescended testicles  VASCULAR SURGERY PROCEDURE UNLIST  2006  
 negative community screening for PAD/AAA  VASCULAR SURGERY PROCEDURE UNLIST  2008  
 <50% B ICA Social History Socioeconomic History  Marital status:  Spouse name: Not on file  Number of children: 2  
 Years of education: Not on file  Highest education level: Not on file Social Needs  Financial resource strain: Not on file  Food insecurity - worry: Not on file  Food insecurity - inability: Not on file  Transportation needs - medical: Not on file  Transportation needs - non-medical: Not on file Occupational History  Occupation:  Employer: [employer] Tobacco Use  Smoking status: Never Smoker  Smokeless tobacco: Never Used Substance and Sexual Activity  Alcohol use: Yes Comment: rarely  Drug use: No  
 Sexual activity: Yes  
  Partners: Female Comment: Wife Other Topics Concern  Not on file Social History Narrative  Not on file Current Outpatient Medications Medication Sig  
 atorvastatin (LIPITOR) 80 mg tablet Take 1 Tab by mouth daily.  metFORMIN ER (GLUCOPHAGE XR) 500 mg tablet Take 1 Tab by mouth daily (with dinner).  metoprolol succinate (TOPROL XL) 25 mg XL tablet Take 1 Tab by mouth daily.  clopidogrel (PLAVIX) 75 mg tab Take 1 Tab by mouth daily.  ezetimibe (ZETIA) 10 mg tablet Take 1 Tab by mouth daily.  glucose blood VI test strips (PRECISION XTRA TEST) strip Pt to test blood sugar once daily. Dx: E11.9  Lancets misc Use daily as directed  Cholecalciferol, Vitamin D3, 5,000 unit Tab Take  by mouth daily.  UBIDECARENONE (COENZYME Q10 PO) Take  by mouth daily.  aspirin 81 mg tablet Take 81 mg by mouth daily.  pantoprazole (PROTONIX) 40 mg tablet Take 1 Tab by mouth daily. (Patient taking differently: Take 40 mg by mouth daily.)  Comp. Stocking,Thigh,Long,X-Sml Misc 1 Package by Does Not Apply route daily. 30-40 mm/hg (Patient taking differently: 1 Package by Does Not Apply route daily. 30-40 mm/hg)  GLUCOSAMINE HCL/CHONDRO PINEDA A (GLUCOSAMINE-CHONDROITIN PO) Take  by mouth daily. No current facility-administered medications for this visit. REVIEW OF SYSTEMS: sees Dr. Satish Moreno 2015 Dr Elisha H[facility], no podiatry Ophtho  no vision change or eye pain Oral  no mouth pain, tongue or tooth problems Ears  no hearing loss, ear pain, fullness Throat  no swallowing problems Cardiac  no CP, PND, orthopnea, edema, palpitations or syncope Chest  no breast masses Resp  no wheezing, chronic coughing, dyspnea GI  no heartburn, nausea, vomiting, change in bowel habits, bleeding, hemorrhoids Urinary  no dysuria, hematuria, flank pain, urgency, frequency Constitutional  no wt loss, night sweats, unexplained fevers Visit Vitals /64 Pulse (!) 54 Temp 98.2 °F (36.8 °C) (Oral) Resp 14 Ht 5' 11\" (1.803 m) Wt 169 lb (76.7 kg) SpO2 96% BMI 23.57 kg/m² A&O x3 Affect is appropriate. Mood stable No apparent distress Anicteric, no JVD, adenopathy or thyromegaly. No carotid bruits or radiated murmur Lungs clear to auscultation, no wheezes or rales No chest wall tenderness Heart showed regular rate and rhythm. No murmur, rubs, gallops Abdomen soft nontender, no hepatosplenomegaly or masses. Ext without c/c/e. LABS From 1/11 showed    gluc 104, cr 0.90,             alt  38,                                   chol 173, tg 82, hdl 49, ldl-c 108, psa 0.90 From 7/11 showed                                 ck 48, javed 7.7 From 2/12 showed    gluc 108                               mma 116,    b12 409, fol 13.7, homo 8.9, nl esr, shannan neg, nl tsh, 2 hr gtt 98 From 4/12 showed        hba1c 6.2,                 chol 155, tg 93,   hdl 68, ldl-c 68 From 10/12 showed  gluc 98,   cr 0.80,             alt 29, hba1c 6.4 From 3/13 showed    gluc 89,   cr 0.80, gfr 91,  alt 14,                   ldl-p 867, chol 160, tg 50,   hdl 75, ldl-c 75,   psa 0.80 From 8/13 showed                                chol 167, tg 57,   hdl 79, ldl-c 77,  wbc 3.7, hb 13.1, plt 154, vit d 53 From 3/14 showed    gluc 103, cr 0.80, gfr 91,  alt 47, hba1c 6.2,               chol 164, tg 74,   hdl 74, ldl-c 72,  wbc 8.6, hb 14.0, plt 186 From 7/14 showed    gluc 89,   cr 0.94, gfr>60,     hba1c 6.4,               chol 157, tg 87,   hdl 74, ldl-c 67,  wbc 4.3, hb 13.4, plt 168, ck/trop neg From 9/14 showed         hba1c 6.2,               psa 0.76,             ua neg From 3/15 showed    gluc 105, cr 0.80,      alt 34, hba1c 6.4,    chol 175, tg 76,   hdl 78, ldl-c 82,   wbc 3.9, hb 14.5, plt 176 From 4/15 showed                     wbc 2.4,               plt 95 From 6/15 showed                     wbc 3.8, hb 14.4, plt 123, fe 90, %sat 30, feritin 334, b12 365, fol 14.7 From 9/15 showed         hba1c 6.3,    chol 165, tg 91,   hdl 63, ldl-c 84 From 1/16 showed    gluc 114, cr 0.80, gfr 90,  alt 63, hba1c 6.5,    chol 166, tg 100, hdl 73, ldl-c 73,  wbc 3.9, hb 14.5, plt 165 From 4/16 showed         hba1c 6.5,    chol 164, tg 61,   hdl 75, ldl-c 77,      umar 4.0 From 1/17 showed    gluc 101, cr 0.79, gfr>60, alt 33, hba1c 6.5,     chol 155, tg 70,   hdl 75, ldl-c 66,      umar 6.0 From 9/17 showed         hba1c 6.2,    chol 150, tg 77,   hdl 63, ldl-c 72,  wbc 3.5, hb 13.3, plt 160 From 4/18 showed    gluc 107, cr 0.83, gfr>60, alt 35, hba1c 6.2,            umar 9.5, psa 1.06 
 From 10/18 showed         hba1c 6.6,    chol 172, tg 85,   hdl 79, ldl-c 76, wbc 3.9, hb 14.2, plt 172, vit d 42.9 Patient Active Problem List  
Diagnosis Code  Hyperlipidemia E78.5  Peripheral neuropathy Dr. Rajiv Estes, Dr. V[place] Mills G62.9  Hypovitaminosis D 2012 E55.9  Arthritis, degenerative M19.90  
 CAD s/p LAD stent 2007 Dr Marylou Nicole I25.10  Advance directive discussed with patient Z70.80  
 Colon polyp Dr Carline Woodard 7/15 K63.5  Sinus bradycardia R00.1  Controlled type 2 diabetes mellitus with diabetic neuropathy, without long-term current use of insulin (HCC) E11.40  GERD without esophagitis K21.9 Assessment and plan: 1. CHD. F/U Dr. Marylou Nicole, continue current regimen 2. Nephrolithiasis. Hydration 3. Neuropathy. F/U neurology as needed, stable 4. Hypovit D. Check level next draw 5. DM. Continue current regimen and doing well, f/u Dr Lauren Roman 6. Dyslipidemia. Continue current regimen. 7.  GERD. Avoidance measures, ppi as needed RTC 4/19 Above conditions discussed at length and patient vocalized understanding. All questions answered to patient satifaction TOTAL time 40 min spent of which at least 30 min was on counseling, answering questions and coordination of care

## 2018-10-14 NOTE — PATIENT INSTRUCTIONS
Medicare Wellness Visit, Male The best way to live healthy is to have a lifestyle where you eat a well-balanced diet, exercise regularly, limit alcohol use, and quit all forms of tobacco/nicotine, if applicable. Regular preventive services are another way to keep healthy. Preventive services (vaccines, screening tests, monitoring & exams) can help personalize your care plan, which helps you manage your own care. Screening tests can find health problems at the earliest stages, when they are easiest to treat. [address] follows the current, evidence-based guidelines published by the [facility] ([facility]) when recommending preventive services for our patients. Because we follow these guidelines, sometimes recommendations change over time as research supports it. (For example, a prostate screening blood test is no longer routinely recommended for men with no symptoms.) Of course, you and your doctor may decide to screen more often for some diseases, based on your risk and co-morbidities (chronic disease you are already diagnosed with). Preventive services for you include: - Medicare offers their members a free annual wellness visit, which is time for you and your primary care provider to discuss and plan for your preventive service needs. Take advantage of this benefit every year! 
-All adults over age 72 should receive the recommended pneumonia vaccines. Current USPSTF guidelines recommend a series of two vaccines for the best pneumonia protection.  
-All adults should have a flu vaccine yearly and an ECG.  All adults age 61 and older should receive a shingles vaccine once in their lifetime.   
-All adults age 38-68 who are overweight should have a diabetes screening test once every three years.  
-Other screening tests & preventive services for persons with diabetes include: an eye exam to screen for diabetic retinopathy, a kidney function test, a foot exam, and stricter control over your cholesterol.  
-Cardiovascular screening for adults with routine risk involves an electrocardiogram (ECG) at intervals determined by the provider.  
-Colorectal cancer screening should be done for adults age 54-65 with no increased risk factors for colorectal cancer. There are a number of acceptable methods of screening for this type of cancer. Each test has its own benefits and drawbacks. Discuss with your provider what is most appropriate for you during your annual wellness visit. The different tests include: colonoscopy (considered the best screening method), a fecal occult blood test, a fecal DNA test, and sigmoidoscopy. 
-All adults born between [facility] should be screened once for Hepatitis C. 
-An Abdominal Aortic Aneurysm (AAA) Screening is recommended for men age 73-68 who has ever smoked in their lifetime. Here is a list of your current Health Maintenance items (your personalized list of preventive services) with a due date: 
Health Maintenance Due Topic Date Due  Shingles Vaccine (1 of 2) 03/11/1994  Flu Vaccine  08/01/2018  Cholesterol Test   09/22/2018 [address] [place] [address] Annual Well Visit  10/03/2018  Hemoglobin A1C    10/17/2018 Vaccine Information Statement Influenza (Flu) Vaccine (Inactivated or Recombinant): What you need to know Many Vaccine Information Statements are available in [place] and other languages. See www.immunize.org/vis Hojas de Información Sobre Vacunas están disponibles en Español y en muchos otros idiomas. Visite www.immunize.org/vis 1. Why get vaccinated? Influenza (flu) is a contagious disease that spreads around the [place] every year, usually between October and May. Flu is caused by influenza viruses, and is spread mainly by coughing, sneezing, and close contact. Anyone can get flu. Flu strikes suddenly and can last several days. Symptoms vary by age, but can include: 
 fever/chills  sore throat  muscle aches  fatigue  cough  headache  runny or stuffy nose Flu can also lead to pneumonia and blood infections, and cause diarrhea and seizures in children. If you have a medical condition, such as heart or lung disease, flu can make it worse. Flu is more dangerous for some people. Infants and young children, people 72years of age and older, pregnant women, and people with certain health conditions or a weakened immune system are at greatest risk. Each year thousands of people in the [facility] die from flu, and many more are hospitalized. Flu vaccine can: 
 keep you from getting flu, 
 make flu less severe if you do get it, and 
 keep you from spreading flu to your family and other people. 2. Inactivated and recombinant flu vaccines A dose of flu vaccine is recommended every flu season. Children 6 months through 6years of age may need two doses during the same flu season. Everyone else needs only one dose each flu season. Some inactivated flu vaccines contain a very small amount of a mercury-based preservative called thimerosal. Studies have not shown thimerosal in vaccines to be harmful, but flu vaccines that do not contain thimerosal are available. There is no live flu virus in flu shots. They cannot cause the flu. There are many flu viruses, and they are always changing. Each year a new flu vaccine is made to protect against three or four viruses that are likely to cause disease in the upcoming flu season. But even when the vaccine doesnt exactly match these viruses, it may still provide some protection Flu vaccine cannot prevent: 
 flu that is caused by a virus not covered by the vaccine, or 
 illnesses that look like flu but are not. It takes about 2 weeks for protection to develop after vaccination, and protection lasts through the flu season. 3. Some people should not get this vaccine Tell the person who is giving you the vaccine:  If you have any severe, life-threatening allergies. If you ever had a life-threatening allergic reaction after a dose of flu vaccine, or have a severe allergy to any part of this vaccine, you may be advised not to get vaccinated. Most, but not all, types of flu vaccine contain a small amount of egg protein.  If you ever had Guillain-Barré Syndrome (also called GBS). Some people with a history of GBS should not get this vaccine. This should be discussed with your doctor.  If you are not feeling well. It is usually okay to get flu vaccine when you have a mild illness, but you might be asked to come back when you feel better. 4. Risks of a vaccine reaction With any medicine, including vaccines, there is a chance of reactions. These are usually mild and go away on their own, but serious reactions are also possible. Most people who get a flu shot do not have any problems with it. Minor problems following a flu shot include:  
 soreness, redness, or swelling where the shot was given  hoarseness  sore, red or itchy eyes  cough  fever  aches  headache  itching  fatigue If these problems occur, they usually begin soon after the shot and last 1 or 2 days. More serious problems following a flu shot can include the following:  There may be a small increased risk of Guillain-Barré Syndrome (GBS) after inactivated flu vaccine. This risk has been estimated at 1 or 2 additional cases per million people vaccinated. This is much lower than the risk of severe complications from flu, which can be prevented by flu vaccine.  Young children who get the flu shot along with pneumococcal vaccine (PCV13) and/or DTaP vaccine at the same time might be slightly more likely to have a seizure caused by fever. Ask your doctor for more information. Tell your doctor if a child who is getting flu vaccine has ever had a seizure. Problems that could happen after any injected vaccine:  People sometimes faint after a medical procedure, including vaccination. Sitting or lying down for about 15 minutes can help prevent fainting, and injuries caused by a fall. Tell your doctor if you feel dizzy, or have vision changes or ringing in the ears.  Some people get severe pain in the shoulder and have difficulty moving the arm where a shot was given. This happens very rarely.  Any medication can cause a severe allergic reaction. Such reactions from a vaccine are very rare, estimated at about 1 in a million doses, and would happen within a few minutes to a few hours after the vaccination. As with any medicine, there is a very remote chance of a vaccine causing a serious injury or death. The safety of vaccines is always being monitored. For more information, visit: www.cdc.gov/vaccinesafety/ 
 
 
6. The National Vaccine Injury Compensation Program 
 
The [facility] Vaccine Injury Compensation Program (VICP) is a federal program that was created to compensate people who may have been injured by certain vaccines. Persons who believe they may have been injured by a vaccine can learn about the program and about filing a claim by calling 1-380.342.2793 or visiting the Arash Jerez website at www.[facility].gov/vaccinecompensation. There is a time limit to file a claim for compensation. 7. How can I learn more?  Ask your healthcare provider. He or she can give you the vaccine package insert or suggest other sources of information.  Call your local or state health department.  Contact the Centers for Disease Control and Prevention (CDC): 
- Call 3-112.562.9123 (1-800-CDC-INFO) or 
- Visit CDCs website at www.cdc.gov/flu Vaccine Information Statement Inactivated Influenza Vaccine 8/7/2015 
[address] [id number] Department of Health and [place] [employer] Centers for Disease Control and Prevention Office Use Only

## 2018-10-17 ENCOUNTER — TELEPHONE (OUTPATIENT)
Dept: INTERNAL MEDICINE CLINIC | Age: 73
End: 2018-10-17

## 2018-10-17 ENCOUNTER — OFFICE VISIT (OUTPATIENT)
Dept: INTERNAL MEDICINE CLINIC | Age: 73
End: 2018-10-17

## 2018-10-17 VITALS
RESPIRATION RATE: 14 BRPM | BODY MASS INDEX: 23.66 KG/M2 | SYSTOLIC BLOOD PRESSURE: 112 MMHG | TEMPERATURE: 98.2 F | HEIGHT: 71 IN | HEART RATE: 54 BPM | WEIGHT: 169 LBS | DIASTOLIC BLOOD PRESSURE: 64 MMHG | OXYGEN SATURATION: 96 %

## 2018-10-17 DIAGNOSIS — Z13.31 SCREENING FOR DEPRESSION: ICD-10-CM

## 2018-10-17 DIAGNOSIS — I25.10 CORONARY ARTERY DISEASE INVOLVING NATIVE CORONARY ARTERY OF NATIVE HEART WITHOUT ANGINA PECTORIS: ICD-10-CM

## 2018-10-17 DIAGNOSIS — Z71.89 ADVANCED DIRECTIVES, COUNSELING/DISCUSSION: ICD-10-CM

## 2018-10-17 DIAGNOSIS — Z12.5 SPECIAL SCREENING FOR MALIGNANT NEOPLASM OF PROSTATE: ICD-10-CM

## 2018-10-17 DIAGNOSIS — E55.9 HYPOVITAMINOSIS D: ICD-10-CM

## 2018-10-17 DIAGNOSIS — Z12.11 SCREEN FOR COLON CANCER: ICD-10-CM

## 2018-10-17 DIAGNOSIS — E11.40 CONTROLLED TYPE 2 DIABETES MELLITUS WITH DIABETIC NEUROPATHY, WITHOUT LONG-TERM CURRENT USE OF INSULIN (HCC): ICD-10-CM

## 2018-10-17 DIAGNOSIS — G62.9 PERIPHERAL POLYNEUROPATHY: ICD-10-CM

## 2018-10-17 DIAGNOSIS — Z00.00 MEDICARE ANNUAL WELLNESS VISIT, SUBSEQUENT: Primary | ICD-10-CM

## 2018-10-17 DIAGNOSIS — Z13.39 SCREENING FOR ALCOHOLISM: ICD-10-CM

## 2018-10-17 DIAGNOSIS — E78.5 HYPERLIPIDEMIA, UNSPECIFIED HYPERLIPIDEMIA TYPE: ICD-10-CM

## 2018-10-17 DIAGNOSIS — Z13.6 SCREENING FOR ISCHEMIC HEART DISEASE: ICD-10-CM

## 2018-10-17 DIAGNOSIS — K21.9 GERD WITHOUT ESOPHAGITIS: ICD-10-CM

## 2018-10-17 DIAGNOSIS — Z13.1 SCREENING FOR DIABETES MELLITUS: ICD-10-CM

## 2018-10-17 DIAGNOSIS — Z23 ENCOUNTER FOR IMMUNIZATION: ICD-10-CM

## 2018-10-17 NOTE — PROGRESS NOTES
1. Have you been to the ER, urgent care clinic or hospitalized since your last visit? NO.  
 
2. Have you seen or consulted any other health care providers outside of the [address] [place] [address] since your last visit (Include any pap smears or colon screening)? NO Do you have an Advanced Directive? NO Would you like information on Advanced Directives? NO Chief Complaint Patient presents with  Cholesterol Problem 6 month follow up with labs Jethro Reyes Annual Wellness Visit Osvaldo Ruiz is a 75 y.o. male who presents for routine immunizations. Per verbal order from [address]. Influenza given in left deltoid. He denies any symptoms , reactions or allergies that would exclude them from being immunized today. Risks and adverse reactions were discussed and the VIS was given to them. All questions were addressed. He was observed for 10 min post injection. There were no reactions observed.  
 
Ruthy Randall LPN

## 2018-12-31 DIAGNOSIS — E11.40 CONTROLLED TYPE 2 DIABETES MELLITUS WITH DIABETIC NEUROPATHY, WITHOUT LONG-TERM CURRENT USE OF INSULIN (HCC): ICD-10-CM

## 2018-12-31 NOTE — TELEPHONE ENCOUNTER
PCP: Isaias Lang MD    Last appt: 10/17/2018  Future Appointments   Date Time Provider Emily Domignuez   4/24/2019  8:00 AM Isaias Lang MD [facility]   10/1/2019  8:00 AM Babette Leyden, MD [address] [place] [address]       Requested Prescriptions     Pending Prescriptions Disp Refills    glucose blood VI test strips (PRECISION XTRA TEST) strip 100 Strip 3     Sig: Pt to test blood sugar once daily.   Dx: E11.9

## 2019-03-04 RX ORDER — PANTOPRAZOLE SODIUM 40 MG/1
40 TABLET, DELAYED RELEASE ORAL DAILY
Qty: 90 TAB | Refills: 3 | Status: SHIPPED | OUTPATIENT
Start: 2019-03-04 | End: 2020-04-28 | Stop reason: SDUPTHER

## 2019-03-29 RX ORDER — NITROGLYCERIN 0.4 MG/1
0.4 TABLET SUBLINGUAL
Qty: 1 BOTTLE | Refills: 3 | Status: SHIPPED | OUTPATIENT
Start: 2019-03-29 | End: 2022-04-13 | Stop reason: SDUPTHER

## 2019-04-12 ENCOUNTER — OFFICE VISIT (OUTPATIENT)
Dept: INTERNAL MEDICINE CLINIC | Age: 74
End: 2019-04-12

## 2019-04-12 ENCOUNTER — TELEPHONE (OUTPATIENT)
Dept: INTERNAL MEDICINE CLINIC | Age: 74
End: 2019-04-12

## 2019-04-12 VITALS
SYSTOLIC BLOOD PRESSURE: 109 MMHG | BODY MASS INDEX: 23.55 KG/M2 | HEIGHT: 71 IN | OXYGEN SATURATION: 98 % | WEIGHT: 168.2 LBS | DIASTOLIC BLOOD PRESSURE: 77 MMHG | TEMPERATURE: 98.1 F | RESPIRATION RATE: 16 BRPM | HEART RATE: 66 BPM

## 2019-04-12 DIAGNOSIS — E11.40 CONTROLLED TYPE 2 DIABETES MELLITUS WITH DIABETIC NEUROPATHY, WITHOUT LONG-TERM CURRENT USE OF INSULIN (HCC): ICD-10-CM

## 2019-04-12 RX ORDER — LANCETS
EACH MISCELLANEOUS
Qty: 1 EACH | Refills: 11 | Status: SHIPPED | OUTPATIENT
Start: 2019-04-12 | End: 2020-04-28 | Stop reason: SDUPTHER

## 2019-04-12 NOTE — TELEPHONE ENCOUNTER
Mail lab slip and rx for lancets. He goes to the [facility] for both. Pt says he was supposed to get at check out.

## 2019-04-12 NOTE — PROGRESS NOTES
1. Have you been to the ER, urgent care clinic or hospitalized since your last visit? NO.  
 
2. Have you seen or consulted any other health care providers outside of the [address] [place] [address] since your last visit (Include any pap smears or colon screening)?  NO

## 2019-04-13 NOTE — PROGRESS NOTES
75 y.o. white male who presents for evaluation. No cardiovascular complaints and he continues to walk 2 miles several times a week. No polyuria, polydipsia, nocturia, vision change. FBS in the 100-120 range, postprandials in the 100-140 range No gi or gu issues The neuropathy sx are about the same limited to the feet and no progression over the years LAST MEDICARE WELLNESS EXAM: 3/28/14, 4/10/15, 4/13/16, 10/2/17, 10/17/18 Past Medical History:  
Diagnosis Date  Atypical chest pain 2007, 2015  Cardiac catheterization 07/31/2014 LAD diffuse 20-30%. Prior pLAD stent patent. Otherwise < 20% coronary artery disease. LVEDP 10 mmHg. EF 55%. Minimal anteroapical hypk.  Cardiac echocardiogram 12/23/2013 EF 55-60%. No WMA. BRANDIE. Mild MR. Mild TR. No significant valvular heart disease.  Cardiac nuclear imaging test 12/23/2013 No evidence of ischemia or prior infarction. EF 63%. No RWMA. Neg EKG on max EST. Ex time 10 min 48 sec.  Carotid duplex 04/08/2016 Mild <50% bilateral ICA stenosis.  Colon polyps 2015 Dr Gabe Morse [facility]  Diabetes mellitus ([facility] [address] 1/15 on basis of hba1c  Dyslipidemia  GERD (gastroesophageal reflux disease) 10/2017  Gilbert's syndrome  Gout 2002  Hypovitaminosis D 2012  Lung nodule 09/2017  
 on CT; questionable 8mm RUL, neg aneurysm; subsequent CT 3/18 showed no nodule, just atelectasis  Nephrolithiasis   
 ureteral stone 2004 Dr. Savana Bee, lithotripsy [facility] 2005  Osteoarthritis  Peripheral neuropathy  [place], Dr. Rojas Maya 10/11  Prediabetes  Venous insufficiency s/p laser vein ablation Dr. Ml Vidal 7/12   
 left leg Past Surgical History:  
Procedure Laterality Date  ABDOMEN SURGERY PROC UNLISTED  10/2017 MRI abd neg  CARDIAC SURG PROCEDURE UNLIST  12/13  
 thallium negative, ef 63%  CARDIAC SURG PROCEDURE UNLIST  12/13  
 echo nl lv, ef 60%, mild MR, mild TR  
  CARDIAC SURG PROCEDURE UNLIST  05/2017  
 stress echo neg, ef 57% [facility]  CARDIAC SURG PROCEDURE UNLIST  09/2017 NST neg, ef 57%  HX APPENDECTOMY  HX COLONOSCOPY Dr Yanez Showers 1/05 negative; Dr Merritt Gosselin polyps 7/15  HX CORONARY STENT PLACEMENT    
 severe 95% LAD disease stented to residual 0%.  HX GI    
 US abd negative 1/08; MRI abd neg 10/17  HX LITHOTRIPSY  07/05  HX ORTHOPAEDIC  2007  
 let meniscus tear Dr. Marilu Morley  HX OTHER SURGICAL    
 surgery for undescended testicles  VASCULAR SURGERY PROCEDURE UNLIST  2006  
 negative community screening for PAD/AAA  VASCULAR SURGERY PROCEDURE UNLIST  2008  
 <50% B ICA Social History Socioeconomic History  Marital status:  Spouse name: Not on file  Number of children: 2  
 Years of education: Not on file  Highest education level: Not on file Occupational History  Occupation:  Employer: RETIRED Social Needs  Financial resource strain: Not on file  Food insecurity:  
  Worry: Not on file Inability: Not on file  Transportation needs:  
  Medical: Not on file Non-medical: Not on file Tobacco Use  Smoking status: Never Smoker  Smokeless tobacco: Never Used Substance and Sexual Activity  Alcohol use: Yes Comment: rarely  Drug use: No  
 Sexual activity: Yes  
  Partners: Female Comment: Wife Lifestyle  Physical activity:  
  Days per week: Not on file Minutes per session: Not on file  Stress: Not on file Relationships  Social connections:  
  Talks on phone: Not on file Gets together: Not on file Attends Cheondoism service: Not on file Active member of club or organization: Not on file Attends meetings of clubs or organizations: Not on file Relationship status: Not on file  Intimate partner violence:  
  Fear of current or ex partner: Not on file Emotionally abused: Not on file Physically abused: Not on file Forced sexual activity: Not on file Other Topics Concern  Not on file Social History Narrative  Not on file Current Outpatient Medications Medication Sig  
 lancets misc Use daily as directed  nitroglycerin (NITROSTAT) 0.4 mg SL tablet 1 Tab by SubLINGual route every five (5) minutes as needed for Chest Pain. Up to 3 doses.  pantoprazole (PROTONIX) 40 mg tablet Take 1 Tab by mouth daily.  glucose blood VI test strips (PRECISION XTRA TEST) strip Pt to test blood sugar once daily. Dx: E11.9  
 atorvastatin (LIPITOR) 80 mg tablet Take 1 Tab by mouth daily.  metFORMIN ER (GLUCOPHAGE XR) 500 mg tablet Take 1 Tab by mouth daily (with dinner).  metoprolol succinate (TOPROL XL) 25 mg XL tablet Take 1 Tab by mouth daily.  clopidogrel (PLAVIX) 75 mg tab Take 1 Tab by mouth daily.  ezetimibe (ZETIA) 10 mg tablet Take 1 Tab by mouth daily.  Cholecalciferol, Vitamin D3, 5,000 unit Tab Take  by mouth daily.  UBIDECARENONE (COENZYME Q10 PO) Take  by mouth daily.  aspirin 81 mg tablet Take 81 mg by mouth daily.  Comp. Stocking,Thigh,Long,X-Sml Misc 1 Package by Does Not Apply route daily. 30-40 mm/hg (Patient taking differently: 1 Package by Does Not Apply route daily. 30-40 mm/hg)  GLUCOSAMINE HCL/CHONDRO PINEDA A (GLUCOSAMINE-CHONDROITIN PO) Take  by mouth daily. No current facility-administered medications for this visit. REVIEW OF SYSTEMS: sees Dr. Kalyan Hale 2015 Dr Anabela Howell at [facility], no podiatry Ophtho  no vision change or eye pain Oral  no mouth pain, tongue or tooth problems Ears  no hearing loss, ear pain, fullness Throat  no swallowing problems Cardiac  no CP, PND, orthopnea, edema, palpitations or syncope Chest  no breast masses Resp  no wheezing, chronic coughing, dyspnea GI  no heartburn, nausea, vomiting, change in bowel habits, bleeding, hemorrhoids Urinary  no dysuria, hematuria, flank pain, urgency, frequency Constitutional  no wt loss, night sweats, unexplained fevers Visit Vitals /77 (BP 1 Location: Left arm, BP Patient Position: Sitting) Pulse 66 Temp 98.1 °F (36.7 °C) (Oral) Resp 16 Ht 5' 11\" (1.803 m) Wt 168 lb 3.2 oz (76.3 kg) SpO2 98% BMI 23.46 kg/m² A&O x3 Affect is appropriate. Mood stable No apparent distress Anicteric, no JVD, adenopathy or thyromegaly. No carotid bruits or radiated murmur Lungs clear to auscultation, no wheezes or rales No chest wall tenderness Heart showed regular rate and rhythm. No murmur, rubs, gallops Abdomen soft nontender, no hepatosplenomegaly or masses. Ext without c/c/e. LABS From 1/11 showed    gluc 104, cr 0.90,             alt  38,                                   chol 173, tg 82, hdl 49, ldl-c 108, psa 0.90 From 7/11 showed                                 ck 48, javed 7.7 From 2/12 showed    gluc 108                               mma 116,    b12 409, fol 13.7, homo 8.9, nl esr, shannan neg, nl tsh, 2 hr gtt 98 From 4/12 showed        hba1c 6.2,                 chol 155, tg 93,   hdl 68, ldl-c 68 From 10/12 showed  gluc 98,   cr 0.80,             alt 29, hba1c 6.4 From 3/13 showed    gluc 89,   cr 0.80, gfr 91,  alt 14,                   ldl-p 867, chol 160, tg 50,   hdl 75, ldl-c 75,   psa 0.80 From 8/13 showed                                chol 167, tg 57,   hdl 79, ldl-c 77,  wbc 3.7, hb 13.1, plt 154, vit d 53 From 3/14 showed    gluc 103, cr 0.80, gfr 91,  alt 47, hba1c 6.2,               chol 164, tg 74,   hdl 74, ldl-c 72,  wbc 8.6, hb 14.0, plt 186 From 7/14 showed    gluc 89,   cr 0.94, gfr>60,     hba1c 6.4,               chol 157, tg 87,   hdl 74, ldl-c 67,  wbc 4.3, hb 13.4, plt 168, ck/trop neg From 9/14 showed         hba1c 6.2,               psa 0.76,             ua neg From 3/15 showed    gluc 105, cr 0.80,      alt 34, hba1c 6.4,    chol 175, tg 76,   hdl 78, ldl-c 82,   wbc 3.9, hb 14.5, plt 176 From 4/15 showed                     wbc 2.4,               plt 95 From 6/15 showed                     wbc 3.8, hb 14.4, plt 123, fe 90, %sat 30, feritin 334, b12 365, fol 14.7 From 9/15 showed         hba1c 6.3,    chol 165, tg 91,   hdl 63, ldl-c 84 From 1/16 showed    gluc 114, cr 0.80, gfr 90,  alt 63, hba1c 6.5,    chol 166, tg 100, hdl 73, ldl-c 73,  wbc 3.9, hb 14.5, plt 165 From 4/16 showed         hba1c 6.5,    chol 164, tg 61,   hdl 75, ldl-c 77,      umar 4.0 From 1/17 showed    gluc 101, cr 0.79, gfr>60, alt 33, hba1c 6.5,     chol 155, tg 70,   hdl 75, ldl-c 66,      umar 6.0 From 9/17 showed         hba1c 6.2,    chol 150, tg 77,   hdl 63, ldl-c 72,  wbc 3.5, hb 13.3, plt 160 From 4/18 showed    gluc 107, cr 0.83, gfr>60, alt 35, hba1c 6.2,            umar 9.5, psa 1.06 From 10/18 showed         hba1c 6.6,    chol 172, tg 85,   hdl 79, ldl-c 76,  wbc 3.9, hb 14.2, plt 172,       vit d 42.9 From 4/19 showed   gluc 101, cr 0.73, gfr>60, alt 21, hba1c 6.2,        chol 164, tg 76,   hdl 79, ldl-c 70,  wbc 3.1, hb 13.5, plt 162, umar 7.0, vit d 37.3 Patient Active Problem List  
Diagnosis Code  Hyperlipidemia E78.5  Peripheral neuropathy Dr. Savage Fajardo, Dr. Veronika Oseguera G62.9  Hypovitaminosis D 2012 E55.9  Arthritis, degenerative M19.90  
 CAD s/p LAD stent 2007 Dr Elo Giron I25.10  Advance directive discussed with patient Z70.80  
 Colon polyp Dr Inga Armstrong 7/15 K63.5  Sinus bradycardia R00.1  Controlled type 2 diabetes mellitus with diabetic neuropathy, without long-term current use of insulin (HCC) E11.40  GERD without esophagitis K21.9 Assessment and plan: 1. CHD. F/U Dr. Elo Giron, continue current regimen 2. Nephrolithiasis. Hydration 3. Neuropathy. F/U neurology as needed, stable 4. Hypovit D. Check level next draw 5. DM. Continue current regimen and doing well, f/u Dr Randi Louis 6.  Dyslipidemia. Continue current regimen. 7.  GERD. Avoidance measures, ppi as needed RTC 10/19 Above conditions discussed at length and patient vocalized understanding. All questions answered to patient satifaction TOTAL time 40 min spent of which at least 30 min was on counseling, answering questions and coordination of care

## 2019-05-02 ENCOUNTER — OFFICE VISIT (OUTPATIENT)
Dept: INTERNAL MEDICINE CLINIC | Age: 74
End: 2019-05-02

## 2019-05-02 VITALS
TEMPERATURE: 98.3 F | HEIGHT: 71 IN | OXYGEN SATURATION: 98 % | RESPIRATION RATE: 14 BRPM | SYSTOLIC BLOOD PRESSURE: 103 MMHG | BODY MASS INDEX: 23.1 KG/M2 | WEIGHT: 165 LBS | HEART RATE: 66 BPM | DIASTOLIC BLOOD PRESSURE: 78 MMHG

## 2019-05-02 DIAGNOSIS — J40 BRONCHITIS: Primary | ICD-10-CM

## 2019-05-02 RX ORDER — AZITHROMYCIN 250 MG/1
TABLET, FILM COATED ORAL
Qty: 6 TAB | Refills: 0 | Status: SHIPPED | OUTPATIENT
Start: 2019-05-02 | End: 2019-10-28 | Stop reason: ALTCHOICE

## 2019-05-02 NOTE — PROGRESS NOTES
75 y.o. WHITE OR  male who presents for evaluation. He was in [facility] visiting family when he came down with URI symptoms. Apparently, his wife and several other people came down right around the same time with similar complaints. He had low-grade temps, dry cough, no ear pain, facial pain. He went to a local urgent care, they gave him amoxicillin and he is better but not completely resolved, he is 12 days into s. He will be going on a [place] cruise in the near future and just wanted to be checked out. No wheezing, dyspnea, pleurisy, sinus complaint. Still no fevers, he did cough up a few times minimally discolored sputum    Past Medical History:   Diagnosis Date    Atypical chest pain     2007, 2015    Cardiac catheterization 07/31/2014    LAD diffuse 20-30%. Prior pLAD stent patent. Otherwise < 20% coronary artery disease. LVEDP 10 mmHg. EF 55%. Minimal anteroapical hypk.  Cardiac echocardiogram 12/23/2013    EF 55-60%. No WMA. BRANDIE. Mild MR. Mild TR. No significant valvular heart disease.  Cardiac nuclear imaging test 12/23/2013    No evidence of ischemia or prior infarction. EF 63%. No RWMA. Neg EKG on max EST. Ex time 10 min 48 sec.  Carotid duplex 04/08/2016    Mild <50% bilateral ICA stenosis.       Colon polyps 2015    Dr Natasha Lewis [facility]    Diabetes mellitus ([facility] [address] 1/15    on basis of hba1c    Dyslipidemia     GERD (gastroesophageal reflux disease) 10/2017    Gilbert's syndrome     Gout 2002    Hypovitaminosis D 2012     Lung nodule 09/2017    on CT; questionable 8mm RUL, neg aneurysm; subsequent CT 3/18 showed no nodule, just atelectasis    Nephrolithiasis     ureteral stone 2004 Dr. Migue Trammell, lithotripsy [facility] 2005    Osteoarthritis     Peripheral neuropathy Dr. Juan Celeste, Dr. Tay Moreno 10/11    Prediabetes     Venous insufficiency s/p laser vein ablation Dr. Ev Nash 7/12     left leg     Current Outpatient Medications   Medication Sig    azithromycin (ZITHROMAX) 250 mg tablet Take 2 tablets today, then take 1 tablet daily    lancets misc Use daily as directed    nitroglycerin (NITROSTAT) 0.4 mg SL tablet 1 Tab by SubLINGual route every five (5) minutes as needed for Chest Pain. Up to 3 doses.  glucose blood VI test strips (PRECISION XTRA TEST) strip Pt to test blood sugar once daily. Dx: E11.9    atorvastatin (LIPITOR) 80 mg tablet Take 1 Tab by mouth daily.  metFORMIN ER (GLUCOPHAGE XR) 500 mg tablet Take 1 Tab by mouth daily (with dinner).  metoprolol succinate (TOPROL XL) 25 mg XL tablet Take 1 Tab by mouth daily.  clopidogrel (PLAVIX) 75 mg tab Take 1 Tab by mouth daily.  ezetimibe (ZETIA) 10 mg tablet Take 1 Tab by mouth daily.  Cholecalciferol, Vitamin D3, 5,000 unit Tab Take  by mouth daily.  UBIDECARENONE (COENZYME Q10 PO) Take  by mouth daily.  aspirin 81 mg tablet Take 81 mg by mouth daily.  pantoprazole (PROTONIX) 40 mg tablet Take 1 Tab by mouth daily.  Comp. Stocking,Thigh,Long,X-Sml Misc 1 Package by Does Not Apply route daily. 30-40 mm/hg (Patient taking differently: 1 Package by Does Not Apply route daily. 30-40 mm/hg)     No current facility-administered medications for this visit. No Known Allergies    Visit Vitals  /78   Pulse 66   Temp 98.3 °F (36.8 °C) (Oral)   Resp 14   Ht 5' 11\" (1.803 m)   Wt 165 lb (74.8 kg)   SpO2 98%   BMI 23.01 kg/m²   Tympanic membranes normal, sinuses nontender, oropharynx otherwise benign, no adenopathy. Lungs are clear with good breath sounds. Heart showed regular rate rhythm. Abdomen was soft and nontender    Assessment and plan:  1. Probable bronchitis. Symptomatic treatment for now although we did give him a prescription for Zithromax in case he has a recrudescence so he can take it on the cruise with him. Still has some leftover cough medicine given by the urgent care.   Call if no improvement        Above conditions discussed at length and patient vocalized understanding.   All questions answered to patient satisfaction

## 2019-05-02 NOTE — PROGRESS NOTES
Alvarado Haque presents today for   Chief Complaint   Patient presents with    Cough     onset 12 days ago. pt was seen at [facility] on 4/19/19 was given amoxil. pt stated cough not any better              Depression Screening:  3 most recent PHQ Screens 4/12/2019   Little interest or pleasure in doing things Not at all   Feeling down, depressed, irritable, or hopeless Not at all   Total Score PHQ 2 0       Learning Assessment:  Learning Assessment 9/12/2017   PRIMARY LEARNER Patient   PRIMARY LANGUAGE ENGLISH   LEARNER PREFERENCE PRIMARY DEMONSTRATION   ANSWERED BY Patient   RELATIONSHIP SELF       Abuse Screening:  Abuse Screening Questionnaire 4/12/2019   Do you ever feel afraid of your partner? N   Are you in a relationship with someone who physically or mentally threatens you? N   Is it safe for you to go home? Y       Fall Risk  Fall Risk Assessment, last 12 mths 4/12/2019   Able to walk? Yes   Fall in past 12 months? No   Fall with injury? -   Number of falls in past 12 months -           Coordination of Care:  1. Have you been to the ER, urgent care clinic since your last visit? Hospitalized since your last visit? [facility]    2. Have you seen or consulted any other health care providers outside of the [address] [place] [address] Dave since your last visit? Include any pap smears or colon screening.  NO

## 2019-06-11 DIAGNOSIS — I25.10 CORONARY ARTERY DISEASE INVOLVING NATIVE CORONARY ARTERY OF NATIVE HEART WITHOUT ANGINA PECTORIS: ICD-10-CM

## 2019-06-11 DIAGNOSIS — I65.23 CAROTID STENOSIS, BILATERAL: ICD-10-CM

## 2019-06-11 DIAGNOSIS — E78.5 HYPERLIPIDEMIA, UNSPECIFIED HYPERLIPIDEMIA TYPE: ICD-10-CM

## 2019-06-11 RX ORDER — CLOPIDOGREL BISULFATE 75 MG/1
75 TABLET ORAL DAILY
Qty: 90 TAB | Refills: 3 | Status: SHIPPED | OUTPATIENT
Start: 2019-06-11 | End: 2020-07-31 | Stop reason: SDUPTHER

## 2019-06-11 RX ORDER — EZETIMIBE 10 MG/1
10 TABLET ORAL DAILY
Qty: 90 TAB | Refills: 3 | Status: SHIPPED | OUTPATIENT
Start: 2019-06-11 | End: 2020-07-31 | Stop reason: SDUPTHER

## 2019-06-11 NOTE — TELEPHONE ENCOUNTER
Last Visit: 5/2/19 with MD Manuelito Morton  Next Appointment: 10/28/19 with MD Manuelito Morton  Previous Refill Encounter(s): 4/24/18 Plavix & Zetia #90 with 3 refills    Requested Prescriptions     Pending Prescriptions Disp Refills    clopidogrel (PLAVIX) 75 mg tab 90 Tab 3     Sig: Take 1 Tab by mouth daily.  ezetimibe (ZETIA) 10 mg tablet 90 Tab 3     Sig: Take 1 Tab by mouth daily.

## 2019-08-28 DIAGNOSIS — I25.10 CORONARY ARTERY DISEASE INVOLVING NATIVE CORONARY ARTERY OF NATIVE HEART WITHOUT ANGINA PECTORIS: ICD-10-CM

## 2019-08-28 NOTE — TELEPHONE ENCOUNTER
Last Visit: 5/2/19 with MD Lis Villanueva  Next Appointment: 10/28/19 with MD Lis Villanueva  Previous Refill Encounter(s): 9/7/18 Glucophage #90 with 3 refills, 8/31/18 Toprol #90 with 3 refills    Requested Prescriptions     Pending Prescriptions Disp Refills    metFORMIN ER (GLUCOPHAGE XR) 500 mg tablet 90 Tab 3     Sig: Take 1 Tab by mouth daily (with dinner).  metoprolol succinate (TOPROL XL) 25 mg XL tablet 90 Tab 3     Sig: Take 1 Tab by mouth daily.

## 2019-08-29 RX ORDER — METOPROLOL SUCCINATE 25 MG/1
25 TABLET, EXTENDED RELEASE ORAL DAILY
Qty: 90 TAB | Refills: 3 | Status: SHIPPED | OUTPATIENT
Start: 2019-08-29 | End: 2019-12-09 | Stop reason: SDUPTHER

## 2019-08-29 RX ORDER — METFORMIN HYDROCHLORIDE 500 MG/1
500 TABLET, EXTENDED RELEASE ORAL
Qty: 90 TAB | Refills: 3 | Status: SHIPPED | OUTPATIENT
Start: 2019-08-29 | End: 2020-06-11 | Stop reason: RX

## 2019-09-27 DIAGNOSIS — E78.5 HYPERLIPIDEMIA, UNSPECIFIED HYPERLIPIDEMIA TYPE: ICD-10-CM

## 2019-09-30 RX ORDER — ATORVASTATIN CALCIUM 80 MG/1
80 TABLET, FILM COATED ORAL DAILY
Qty: 90 TAB | Refills: 3 | Status: SHIPPED | OUTPATIENT
Start: 2019-09-30 | End: 2020-10-21 | Stop reason: SDUPTHER

## 2019-10-01 ENCOUNTER — OFFICE VISIT (OUTPATIENT)
Dept: CARDIOLOGY CLINIC | Age: 74
End: 2019-10-01

## 2019-10-01 VITALS
OXYGEN SATURATION: 98 % | HEIGHT: 71 IN | SYSTOLIC BLOOD PRESSURE: 122 MMHG | BODY MASS INDEX: 23.38 KG/M2 | WEIGHT: 167 LBS | DIASTOLIC BLOOD PRESSURE: 60 MMHG | HEART RATE: 57 BPM

## 2019-10-01 DIAGNOSIS — E78.5 HYPERLIPIDEMIA, UNSPECIFIED HYPERLIPIDEMIA TYPE: ICD-10-CM

## 2019-10-01 DIAGNOSIS — I25.10 CORONARY ARTERY DISEASE INVOLVING NATIVE CORONARY ARTERY OF NATIVE HEART WITHOUT ANGINA PECTORIS: ICD-10-CM

## 2019-10-01 DIAGNOSIS — R06.09 DOE (DYSPNEA ON EXERTION): ICD-10-CM

## 2019-10-01 DIAGNOSIS — R06.02 SOB (SHORTNESS OF BREATH): Primary | ICD-10-CM

## 2019-10-01 DIAGNOSIS — R07.89 CHEST DISCOMFORT: ICD-10-CM

## 2019-10-01 DIAGNOSIS — R00.1 SINUS BRADYCARDIA: ICD-10-CM

## 2019-10-01 NOTE — PROGRESS NOTES
Chelsea Marvin presents today for   Chief Complaint   Patient presents with    Coronary Artery Disease     1 year follow up     Leg Swelling     mild       Chelsea Marvin preferred language for health care discussion is english/other. Is someone accompanying this pt? no    Is the patient using any DME equipment during [address]? no    Depression Screening:  3 most recent PHQ Screens 4/12/2019   Little interest or pleasure in doing things Not at all   Feeling down, depressed, irritable, or hopeless Not at all   Total Score PHQ 2 0       Learning Assessment:  Learning Assessment 9/12/2017   PRIMARY LEARNER Patient   PRIMARY LANGUAGE ENGLISH   LEARNER PREFERENCE PRIMARY DEMONSTRATION   ANSWERED BY Patient   RELATIONSHIP SELF       Abuse Screening:  Abuse Screening Questionnaire 4/12/2019   Do you ever feel afraid of your partner? N   Are you in a relationship with someone who physically or mentally threatens you? N   Is it safe for you to go home? Y       Fall Risk  Fall Risk Assessment, last 12 mths 4/12/2019   Able to walk? Yes   Fall in past 12 months? No   Fall with injury? -   Number of falls in past 12 months -       Pt currently taking Anticoagulant therapy? ASA 81mg every day     Coordination of Care:  1. Have you been to the ER, urgent care clinic since your last visit? Hospitalized since your last visit? no    2. Have you seen or consulted any other health care providers outside of the [address] [place] [address] since your last visit? Include any pap smears or colon screening.  no

## 2019-10-01 NOTE — PATIENT INSTRUCTIONS
Pharm nuc for sob  Follow up 1 year  If you have not heard from the central scheduler to schedule your testing in 48 hours, please call [phone].

## 2019-10-01 NOTE — PROGRESS NOTES
HISTORY OF PRESENT ILLNESS  Tequila Lopez is a 75 y.o. male. ASSESSMENT and PLAN    Mr. Kenia Dickerson has history of CAD. He has never had chest pains or angina equivalent. Back in 5923, he had 64 slice CT scan for unclear reasons. This revealed significant calcification. He subsequently underwent evaluation for CAD despite the fact that he had no symptoms. His evaluation revealed severe LAD disease. He subsequently had LAD stent performed in 2007 and has done fairly well since that time. He remains active physically. Because of recurrent episodes of chest pain, he was readmitted to [facility] in July of 2014; he subsequent underwent repeat coronary angiography which revealed patent LAD stent, without significant, occlusive CAD. Reassurance was given. He presented to the hospital in [place] with abdominal discomfort.  He was concerned about possible coronary syndrome.  This was in early part of May 2017. [facility] ruled out. [facility] had a stress echocardiogram which was reported to him as normal.  In September 2017, he presented to [facility] emergency room with atypical chest pains. Stanley Delgado ruled out acute coronary event and subsequently discharged the patient home. [facility] has had multiple presentations to the emergency room for atypical chest pains over the last 2 years. Because of atypical chest pains, he had nuclear scan performed in September 2017. This showed normal perfusion with EF of 59%. Because of some atypical abdominal pains, he had chest CT back in March 2018. This does not show any significant abnormalities. · CAD:    He remains active physically. He remains symptomatically without chest pain. However, he has never had exertional chest pains even when he has significant LAD disease. Therefore, we will proceed with repeat exercise nuclear scan; his last study was in 2017 and reported to be unremarkable. He does have progressive increasing shortness of breath with exertion.   · BP: Well controlled. · HR:    Stable. Asymptomatic sinus bradycardia. · CHF:    There is no evidence of decompensated CHF noted. · Weight:    His weight today is 167 pounds. His baseline weight is 169 pounds. · Cholesterol:   Target LDL <70. Lipitor 80, Zetia 10. · Anti-platelet:   Remains on ASA, and Plavix. If the above testing is unremarkable, I will see him back annually. Thank you. Encounter Diagnoses   Name Primary?  SOB (shortness of breath) Yes    Sinus bradycardia     Coronary artery disease involving native coronary artery of native heart without angina pectoris     Hyperlipidemia, unspecified hyperlipidemia type     Chest discomfort     VENCES (dyspnea on exertion)      current treatment plan is effective, no change in therapy  lab results and schedule of future lab studies reviewed with patient  reviewed diet, exercise and weight control  cardiovascular risk and specific lipid/LDL goals reviewed  use of aspirin to prevent MI and TIA's discussed        HPI   Today, Mr. Sourav Wong has no complaints of chest pains. He does have gradually worsening dyspnea on exertion. Despite the dyspnea, he remains active physically. He walks about 2 miles daily and occasionally rides his bicycle. He has no exertional chest pains. He denies orthopnea or PND. He denies palpitations or dizziness. At the end of the day, he notices occasional bilateral ankle swelling which resolves by morning. Review of Systems   Respiratory: Positive for shortness of breath. Cardiovascular: Positive for leg swelling. Negative for chest pain, palpitations, orthopnea, claudication and PND. All other systems reviewed and are negative. Physical Exam   Constitutional: He is oriented to person, place, and time. He appears well-developed and well-nourished. HENT:   Head: Normocephalic. Eyes: Conjunctivae are normal.   Neck: Neck supple. No JVD present. Carotid bruit is not present. No thyromegaly present. Cardiovascular: Regular rhythm. Bradycardia present. Pulmonary/Chest: Breath sounds normal.   Abdominal: Bowel sounds are normal.   Musculoskeletal: He exhibits no edema. Neurological: He is alert and oriented to person, place, and time. Skin: Skin is warm and dry. Nursing note and vitals reviewed. PCP: Caryle Deck, MD    Past Medical History:   Diagnosis Date    Atypical chest pain     2007, 2015    Cardiac catheterization 07/31/2014    LAD diffuse 20-30%. Prior pLAD stent patent. Otherwise < 20% coronary artery disease. LVEDP 10 mmHg. EF 55%. Minimal anteroapical hypk.  Cardiac echocardiogram 12/23/2013    EF 55-60%. No WMA. BRANDIE. Mild MR. Mild TR. No significant valvular heart disease.  Cardiac nuclear imaging test 12/23/2013    No evidence of ischemia or prior infarction. EF 63%. No RWMA. Neg EKG on max EST. Ex time 10 min 48 sec.  Carotid duplex 04/08/2016    Mild <50% bilateral ICA stenosis.       [facility]    Diabetes mellitus ([facility] [address] 1/15    on basis of hba1c    Dyslipidemia     GERD (gastroesophageal reflux disease) 10/2017    Gilbert's syndrome     Gout 2002    Hypovitaminosis D 2012     Lung nodule 09/2017    on CT; questionable 8mm RUL, neg aneurysm; subsequent CT 3/18 showed no nodule, just atelectasis    Nephrolithiasis     ureteral stone 2004 Dr. Luna Coleman, lithotripsy [facility] 2005    Osteoarthritis     Peripheral neuropathy Dr. Koko Anglin, Dr. Lance Garcias 10/11    Prediabetes     Venous insufficiency s/p laser vein ablation Dr. Wang Brothers 7/12     left leg       Past Surgical History:   Procedure Laterality Date    ABDOMEN SURGERY [address] UNLISTED  10/2017    MRI abd neg    CARDIAC SURG PROCEDURE UNLIST  12/13    thallium negative, ef 63%    CARDIAC SURG PROCEDURE UNLIST  12/13    echo nl lv, ef 60%, mild MR, mild TR    CARDIAC SURG PROCEDURE UNLIST  05/2017    stress echo neg, ef 57% [facility] CARDIAC SURG PROCEDURE UNLIST  09/2017 NST neg, ef 57%    HX APPENDECTOMY      HX COLONOSCOPY      Dr Codey Leyva 1/05 negative; Dr Luis Gold polyps 7/15    HX CORONARY STENT PLACEMENT      severe 95% LAD disease stented to residual 0%.  HX GI      US abd negative 1/08; MRI abd neg 10/17    HX LITHOTRIPSY  07/05    HX ORTHOPAEDIC  2007    let meniscus tear Dr. Sahara Campo [facility]      surgery for undescended testicles    VASCULAR SURGERY PROCEDURE UNLIST  2006    negative community screening for PAD/AAA    VASCULAR SURGERY PROCEDURE UNLIST  2008    <50% B ICA       Current Outpatient Medications   Medication Sig Dispense Refill    atorvastatin (LIPITOR) 80 mg tablet Take 1 Tab by mouth daily. 90 Tab 3    metFORMIN ER (GLUCOPHAGE XR) 500 mg tablet Take 1 Tab by mouth daily (with dinner). 90 Tab 3    metoprolol succinate (TOPROL XL) 25 mg XL tablet Take 1 Tab by mouth daily. 90 Tab 3    clopidogrel (PLAVIX) 75 mg tab Take 1 Tab by mouth daily. 90 Tab 3    ezetimibe (ZETIA) 10 mg tablet Take 1 Tab by mouth daily. 90 Tab 3    azithromycin (ZITHROMAX) 250 mg tablet Take 2 tablets today, then take 1 tablet daily 6 Tab 0    lancets misc Use daily as directed 1 Each 11    nitroglycerin (NITROSTAT) 0.4 mg SL tablet 1 Tab by SubLINGual route every five (5) minutes as needed for Chest Pain. Up to 3 doses. 1 Bottle 3    pantoprazole (PROTONIX) 40 mg tablet Take 1 Tab by mouth daily. 90 Tab 3    glucose blood VI test strips (PRECISION XTRA TEST) strip Pt to test blood sugar once daily. Dx: E11.9 100 Strip 3    Cholecalciferol, Vitamin D3, 5,000 unit Tab Take  by mouth daily.  Comp. Stocking,Thigh,Long,X-Sml Misc 1 Package by Does Not Apply route daily. 30-40 mm/hg (Patient taking differently: 1 Package by Does Not Apply route daily. 30-40 mm/hg) 1 Package 1    UBIDECARENONE (COENZYME Q10 PO) Take  by mouth daily.  aspirin 81 mg tablet Take 81 mg by mouth daily.          The patient has a family history of    Social History Tobacco Use    Smoking status: Never Smoker    Smokeless tobacco: Never Used   Substance Use Topics    Alcohol use: Yes     Comment: rarely    Drug use: No       Lab Results   Component Value Date/Time    Cholesterol, total 157 07/30/2014 04:14 AM    HDL Cholesterol 74 (H) 07/30/2014 04:14 AM    LDL, calculated 65.6 07/30/2014 04:14 AM    Triglyceride 87 07/30/2014 04:14 AM    CHOL/HDL Ratio 2.1 07/30/2014 04:14 AM        BP Readings from Last 3 Encounters:   10/01/19 122/60   05/02/19 103/78   04/12/19 109/77        Pulse Readings from Last 3 Encounters:   10/01/19 (!) 57   05/02/19 66   04/12/19 66       Wt Readings from Last 3 Encounters:   10/01/19 75.8 kg (167 lb)   05/02/19 74.8 kg (165 lb)   04/12/19 76.3 kg (168 lb 3.2 oz)         EKG: unchanged from previous tracings, sinus bradycardia, RBBB.

## 2019-10-24 NOTE — PROGRESS NOTES
This is a Subsequent Medicare Annual Wellness Visit     I have reviewed the patient's medical history in detail and updated the computerized patient record. History     Past Medical History:   Diagnosis Date    Atypical chest pain     2007, 2015    Cardiac catheterization 07/31/2014    LAD diffuse 20-30%. Prior pLAD stent patent. Otherwise < 20% coronary artery disease. LVEDP 10 mmHg. EF 55%. Minimal anteroapical hypk.  Cardiac echocardiogram 12/23/2013    EF 55-60%. No WMA. BRANDIE. Mild MR. Mild TR. No significant valvular heart disease.  Cardiac nuclear imaging test 12/23/2013    No evidence of ischemia or prior infarction. EF 63%. No RWMA. Neg EKG on max EST. Ex time 10 min 48 sec.  Carotid duplex 04/08/2016    Mild <50% bilateral ICA stenosis.       Colon polyps 2015    Dr Cece [facility]    Diabetes mellitus ([facility] [address] 1/15    on basis of hba1c    Dyslipidemia     GERD (gastroesophageal reflux disease) 10/2017    Gilbert's syndrome     Gout 2002    Hypovitaminosis D 2012     Lung nodule 09/2017    on CT; questionable 8mm RUL, neg aneurysm; subsequent CT 3/18 showed no nodule, just atelectasis    Nephrolithiasis     ureteral stone 2004 Dr. Osman Russell, lithotripsy [facility] 2005    Osteoarthritis     Peripheral neuropathy Dr. Tucker Clemons, Dr. Jonathan Daniels 10/11    Prediabetes     Venous insufficiency s/p laser vein ablation Dr. Star Keller 7/12     left leg      Past Surgical History:   Procedure Laterality Date    ABDOMEN SURGERY [address] UNLISTED  10/2017    MRI abd neg    CARDIAC SURG PROCEDURE UNLIST  12/13    thallium negative, ef 63%    CARDIAC SURG PROCEDURE UNLIST  12/13    echo nl lv, ef 60%, mild MR, mild TR    CARDIAC SURG PROCEDURE UNLIST  05/2017    stress echo neg, ef 57% [facility] CARDIAC SURG PROCEDURE UNLIST  09/2017    NST neg, ef 57%    HX APPENDECTOMY      HX COLONOSCOPY      Dr Devaughn Fuller 1/05 negative; Dr Collin Arvizu polyps 7/15    HX CORONARY STENT PLACEMENT      severe 95% LAD disease stented to residual 0%.  HX GI      US abd negative 1/08; MRI abd neg 10/17    HX LITHOTRIPSY  07/05    HX ORTHOPAEDIC  2007    let meniscus tear Dr. Susu Hernandez [address]      surgery for undescended testicles    VASCULAR SURGERY PROCEDURE UNLIST  2006    negative community screening for PAD/AAA    VASCULAR SURGERY PROCEDURE UNLIST  2008    <50% B ICA     Current Outpatient Medications   Medication Sig Dispense Refill    atorvastatin (LIPITOR) 80 mg tablet Take 1 Tab by mouth daily. 90 Tab 3    metFORMIN ER (GLUCOPHAGE XR) 500 mg tablet Take 1 Tab by mouth daily (with dinner). 90 Tab 3    metoprolol succinate (TOPROL XL) 25 mg XL tablet Take 1 Tab by mouth daily. 90 Tab 3    clopidogrel (PLAVIX) 75 mg tab Take 1 Tab by mouth daily. 90 Tab 3    ezetimibe (ZETIA) 10 mg tablet Take 1 Tab by mouth daily. 90 Tab 3    lancets misc Use daily as directed 1 Each 11    nitroglycerin (NITROSTAT) 0.4 mg SL tablet 1 Tab by SubLINGual route every five (5) minutes as needed for Chest Pain. Up to 3 doses. 1 Bottle 3    pantoprazole (PROTONIX) 40 mg tablet Take 1 Tab by mouth daily. (Patient taking differently: Take 40 mg by mouth as needed.) 90 Tab 3    glucose blood VI test strips (PRECISION XTRA TEST) strip Pt to test blood sugar once daily. Dx: E11.9 100 Strip 3    Cholecalciferol, Vitamin D3, 5,000 unit Tab Take  by mouth daily.  UBIDECARENONE (COENZYME Q10 PO) Take  by mouth daily.  aspirin 81 mg tablet Take 81 mg by mouth daily.  Comp. Stocking,Thigh,Long,X-Sml Misc 1 Package by Does Not Apply route daily. 30-40 mm/hg (Patient taking differently: 1 Package by Does Not Apply route daily.  30-40 mm/hg) 1 Package 1     No Known Allergies     Family History   Problem Relation Age of Onset    Heart Disease Father    [de-identified] Arthritis-rheumatoid Mother      Social History     Tobacco Use    Smoking status: Never Smoker    Smokeless tobacco: Never Used   Substance Use Topics    Alcohol use: Yes     Comment: rarely     Depression Risk Factor Screening:     3 most recent PHQ Screens 4/12/2019   Little interest or pleasure in doing things Not at all   Feeling down, depressed, irritable, or hopeless Not at all   Total Score PHQ 2 0     SCREENINGS  Colonoscopy last done 2015 Dr Verdel Scheuermann at [facility]  Prostate ROSY done 10/19    Immunization History   Administered Date(s) Administered    (RETIRED) Pneumococcal Vaccine (Unspecified Type) 01/23/2008    Influenza High Dose Vaccine PF 10/01/2016, 10/01/2017    Influenza Vaccine 10/01/2012, 11/01/2013, 10/01/2014, 10/01/2015    Influenza Vaccine (Tri) Adjuvanted 10/17/2018, 10/22/2019    Influenza Vaccine Whole 09/01/2010    Pneumococcal Conjugate (PCV-13) 10/01/2015    Pneumococcal Polysaccharide (PPSV-23) 02/06/2017    TD Vaccine 01/01/2005    Zoster 01/01/2008    Zoster Recombinant 11/30/2018, 03/04/2019     Alcohol Risk Factor Screening: On any occasion during the past 3 months, have you had more than 4 drinks containing alcohol? No    Do you average more than 14 drinks per week? No      Functional Ability and Level of Safety:     Hearing Loss   normal-to-mild    Vision - no acute complaints and is followed by Dr. Emigdio Knight    Activities of Daily Living   Self-care. Requires assistance with: no ADLs    Fall Risk     Fall Risk Assessment, last 12 mths 4/12/2019   Able to walk? Yes   Fall in past 12 months? No   Fall with injury? -   Number of falls in past 12 months -     Abuse Screen   Patient is not abused    Review of Systems   A comprehensive review of systems was negative except for that written in the HPI.     Physical Examination     Visit Vitals  /74   Pulse 72   Temp 97.8 °F (36.6 °C) (Oral)   Resp 14   Ht 5' 11\" (1.803 m)   Wt 168 lb (76.2 kg)   SpO2 97%   BMI 23.43 kg/m²       Evaluation of Cognitive Function:  Mood/affect:  neutral  Appearance: age appropriate, well dressed and within normal Limits  Family member/caregiver input: na    Patient Care Team:  Nahomy Whitehead MD as PCP - General (Internal Medicine)  Nahomy Whitehead MD as PCP - [facility]  Karlo Nascimento RN as Ambulatory Care Navigator (Internal Medicine)  Benny Young MD (Cardiology)    Advice/Referrals/Counseling   Education and counseling provided:  Are appropriate based on today's review and evaluation  End-of-Life planning (with patient's consent)  Pneumococcal Vaccine  Influenza Vaccine  Prostate cancer screening tests (PSA, covered annually)  Colorectal cancer screening tests  Cardiovascular screening blood test    Assessment/Plan       ICD-10-CM ICD-9-CM    1. Medicare annual wellness visit, subsequent Z00.00 V70.0    2. Peripheral polyneuropathy G62.9 356.9    3. Hypovitaminosis D 2012 E55.9 268.9 VITAMIN D, 25 HYDROXY   4. Hyperlipidemia, unspecified hyperlipidemia type E78.5 272.4    5. GERD without esophagitis K21.9 530.81 AMB POC FECAL BLOOD, OCCULT, QL 1 CARD   6. Controlled type 2 diabetes mellitus with diabetic neuropathy, without long-term current use of insulin ([facility]) E11.40 250.60 LIPID PANEL     357.2 MICROALBUMIN, UR, RAND W/ MICROALB/CREAT RATIO      METABOLIC PANEL, COMPREHENSIVE      CBC W/O DIFF      HEMOGLOBIN A1C W/O EAG   7. Hyperplastic colonic polyp, unspecified part of colon K63.5 211.3 AMB POC FECAL BLOOD, OCCULT, QL 1 CARD   8. Coronary artery disease involving native coronary artery of native heart without angina pectoris I25.10 414.01    9. Advanced directives, counseling/discussion Z71.89 V65.49 ADVANCE CARE PLANNING FIRST 30 MINS   10. Screening for alcoholism Z13.39 V79.1 [place] ANNUAL ALCOHOL SCREEN 15 MIN   11. Screening for depression Z13.31 V79.0 DEPRESSION SCREEN ANNUAL   12. Special screening for malignant neoplasm of prostate Z12.5 V76.44 [place] PROSTATE CA SCREENING; ROSY     current treatment plan is effective  lab results and schedule of future lab studies reviewed with patient.   End of life discussion undertaken. amd on file   Colonoscopy to be scheduled 2025?

## 2019-10-24 NOTE — PROGRESS NOTES
75 y.o. white male who presents for evaluation. No cardiovascular complaints and he continues to walk 2mi about 5x/week. No polyuria, polydipsia, nocturia, vision change. FBS in the 90-110s, weight is stable    No gi or gu issues. He's giving himself 3 mos ppi then drug holiday, he can get up to 8 mos before restarting    The neuropathy sx are about the same limited to the feet and no progression over the years    [address]: 3/28/14, 4/10/15, 4/13/16, 10/2/17, 10/17/18, 10/28/19    Past Medical History:   Diagnosis Date    Atypical chest pain     2007, 2015    Cardiac catheterization 07/31/2014    LAD diffuse 20-30%. Prior pLAD stent patent. Otherwise < 20% coronary artery disease. LVEDP 10 mmHg. EF 55%. Minimal anteroapical hypk.  Cardiac echocardiogram 12/23/2013    EF 55-60%. No WMA. BRANDIE. Mild MR. Mild TR. No significant valvular heart disease.  Cardiac nuclear imaging test 12/23/2013    No evidence of ischemia or prior infarction. EF 63%. No RWMA. Neg EKG on max EST. Ex time 10 min 48 sec.  Carotid duplex 04/08/2016    Mild <50% bilateral ICA stenosis.       Colon polyps 2015    Dr Cece Stanford [facility]    Diabetes mellitus ([facility] [address] 1/15    on basis of hba1c    Dyslipidemia     GERD (gastroesophageal reflux disease) 10/2017    Gilbert's syndrome     Gout 2002    Hypovitaminosis D 2012     Lung nodule 09/2017    on CT; questionable 8mm RUL, neg aneurysm; subsequent CT 3/18 showed no nodule, just atelectasis    Nephrolithiasis     ureteral stone 2004 Dr. Osman Russell, lithotripsy Nestor Corbin 2005    Osteoarthritis     Peripheral neuropathy Dr. Tucker Clemons, Dr. Jonathan Daniles 10/11    Prediabetes     Venous insufficiency s/p laser vein ablation Dr. Star Keller 7/12     left leg     Past Surgical History:   Procedure Laterality Date    ABDOMEN SURGERY [address] UNLISTED  10/2017    MRI abd neg    CARDIAC SURG PROCEDURE UNLIST  12/13    thallium negative, ef 63%    CARDIAC SURG PROCEDURE UNLIST  12/13    echo nl lv, ef 60%, mild MR, mild TR    CARDIAC SURG PROCEDURE UNLIST  05/2017    stress echo neg, ef 57% [facility] [facility] CARDIAC SURG PROCEDURE UNLIST  09/2017    NST neg, ef 57%    HX APPENDECTOMY      HX COLONOSCOPY      Dr Karina Ortiz 1/05 negative; Dr Siobhan Garcia polyps 7/15    HX CORONARY STENT PLACEMENT      severe 95% LAD disease stented to residual 0%.     HX GI      US abd negative 1/08; MRI abd neg 10/17    HX LITHOTRIPSY  07/05    HX ORTHOPAEDIC  2007    let meniscus tear Dr. Cesar Whitaker [address] [facility] for undescended testicles    VASCULAR SURGERY PROCEDURE UNLIST  2006    negative community screening for PAD/AAA    VASCULAR SURGERY PROCEDURE UNLIST  2008    <50% B ICA     Social History     Socioeconomic History    Marital status:      Spouse name: Not on file    Number of children: 2    Years of education: Not on file    Highest education level: Not on file   Occupational History    Occupation:      Employer: RETIRED   Social Needs    Financial resource strain: Not on file    Food insecurity:     Worry: Not on file     Inability: Not on file    Transportation needs:     Medical: Not on file     Non-medical: Not on file   Tobacco Use    Smoking status: Never Smoker    Smokeless tobacco: Never Used   Substance and Sexual Activity    Alcohol use: Yes     Comment: rarely    Drug use: No    Sexual activity: Yes     Partners: Female     Comment: Wife   Lifestyle    Physical activity:     Days per week: Not on file     Minutes per session: Not on file    Stress: Not on file   Relationships    Social connections:     Talks on phone: Not on file     Gets together: Not on file     Attends Yazidi service: Not on file     Active member of club or organization: Not on file     Attends meetings of clubs or organizations: Not on file     Relationship status: Not on file    Intimate partner violence:     Fear of current or ex partner: Not on file     Emotionally abused: Not on file     Physically abused: Not on file     Forced sexual activity: Not on file   Other Topics Concern    Not on file   Social History Narrative    Not on file     Current Outpatient Medications   Medication Sig    atorvastatin (LIPITOR) 80 mg tablet Take 1 Tab by mouth daily.  metFORMIN ER (GLUCOPHAGE XR) 500 mg tablet Take 1 Tab by mouth daily (with dinner).  metoprolol succinate (TOPROL XL) 25 mg XL tablet Take 1 Tab by mouth daily.  clopidogrel (PLAVIX) 75 mg tab Take 1 Tab by mouth daily.  ezetimibe (ZETIA) 10 mg tablet Take 1 Tab by mouth daily.  lancets misc Use daily as directed    nitroglycerin (NITROSTAT) 0.4 mg SL tablet 1 Tab by SubLINGual route every five (5) minutes as needed for Chest Pain. Up to 3 doses.  pantoprazole (PROTONIX) 40 mg tablet Take 1 Tab by mouth daily. (Patient taking differently: Take 40 mg by mouth as needed.)    glucose blood VI test strips (PRECISION XTRA TEST) strip Pt to test blood sugar once daily. Dx: E11.9    Cholecalciferol, Vitamin D3, 5,000 unit Tab Take  by mouth daily.  UBIDECARENONE (COENZYME Q10 PO) Take  by mouth daily.  aspirin 81 mg tablet Take 81 mg by mouth daily.  Comp. Stocking,Thigh,Long,X-Sml Misc 1 Package by Does Not Apply route daily. 30-40 mm/hg (Patient taking differently: 1 Package by Does Not Apply route daily. 30-40 mm/hg)     No current facility-administered medications for this visit.       REVIEW OF SYSTEMS: sees Dr. Russell Brought 2015 Dr Verdel Scheuermann at [facility] ([place], [place]), no podiatry  Ophtho  no vision change or eye pain  Oral  no mouth pain, tongue or tooth problems  Ears  no hearing loss, ear pain, fullness  Throat  no swallowing problems  Cardiac  no CP, PND, orthopnea, edema, palpitations or syncope  Chest  no breast masses  Resp  no wheezing, chronic coughing, dyspnea  GI  no heartburn, nausea, vomiting, change in bowel habits, bleeding, hemorrhoids  Urinary  no dysuria, hematuria, flank pain, urgency, frequency  Constitutional  no wt loss, night sweats, unexplained fevers    Visit Vitals  /74   Pulse 72   Temp 97.8 °F (36.6 °C) (Oral)   Resp 14   Ht 5' 11\" (1.803 m)   Wt 168 lb (76.2 kg)   SpO2 97%   BMI 23.43 kg/m²     A&O x3  Affect is appropriate. Mood stable  No apparent distress  Anicteric, no JVD, adenopathy or thyromegaly. No carotid bruits or radiated murmur  Lungs clear to auscultation, no wheezes or rales  No chest wall tenderness  Heart showed regular rate and rhythm. No murmur, rubs, gallops  Abdomen soft nontender, no hepatosplenomegaly or masses. Rectal showed guaiac neg brown stool normal tone  Ext without c/c/e.       LABS  From 1/11 showed    gluc 104, cr 0.90,             alt  38,                                   chol 173, tg 82, hdl 49, ldl-c 108, psa 0.90  From 7/11 showed                                 ck 48, javed 7.7  From 2/12 showed    gluc 108                               mma 116,    b12 409, fol 13.7, homo 8.9, nl esr, shannan neg, nl tsh, 2 hr gtt 98   From 4/12 showed        hba1c 6.2,                 chol 155, tg 93,   hdl 68, ldl-c 68  From 10/12 showed  gluc 98,   cr 0.80,             alt 29, hba1c 6.4  From 3/13 showed    gluc 89,   cr 0.80, gfr 91,  alt 14,                   ldl-p 867, chol 160, tg 50,   hdl 75, ldl-c 75,   psa 0.80  From 8/13 showed                                chol 167, tg 57,   hdl 79, ldl-c 77,  wbc 3.7, hb 13.1, plt 154, vit d 53  From 3/14 showed    gluc 103, cr 0.80, gfr 91,  alt 47, hba1c 6.2,               chol 164, tg 74,   hdl 74, ldl-c 72,  wbc 8.6, hb 14.0, plt 186  From 7/14 showed    gluc 89,   cr 0.94, gfr>60,     hba1c 6.4,               chol 157, tg 87,   hdl 74, ldl-c 67,  wbc 4.3, hb 13.4, plt 168, ck/trop neg  From 9/14 showed         hba1c 6.2,               psa 0.76,             ua neg  From 3/15 showed    gluc 105, cr 0.80,      alt 34, hba1c 6.4,    chol 175, tg 76,   hdl 78, ldl-c 82,   wbc 3.9, hb 14.5, plt 176  From 4/15 showed                     wbc 2.4,               plt 95  From 6/15 showed                     wbc 3.8, hb 14.4, plt 123, fe 90, %sat 30, feritin 334, b12 365, fol 14.7   From 9/15 showed         hba1c 6.3,    chol 165, tg 91,   hdl 63, ldl-c 84  From 1/16 showed    gluc 114, cr 0.80, gfr 90,  alt 63, hba1c 6.5,    chol 166, tg 100, hdl 73, ldl-c 73,  wbc 3.9, hb 14.5, plt 165  From 4/16 showed         hba1c 6.5,    chol 164, tg 61,   hdl 75, ldl-c 77,      umar 4.0  From 1/17 showed    gluc 101, cr 0.79, gfr>60, alt 33, hba1c 6.5,     chol 155, tg 70,   hdl 75, ldl-c 66,      umar 6.0  From 9/17 showed         hba1c 6.2,    chol 150, tg 77,   hdl 63, ldl-c 72,  wbc 3.5, hb 13.3, plt 160  From 4/18 showed    gluc 107, cr 0.83, gfr>60, alt 35, hba1c 6.2,            umar 9.5, psa 1.06  From 10/18 showed         hba1c 6.6,    chol 172, tg 85,   hdl 79, ldl-c 76,  wbc 3.9, hb 14.2, plt 172,       vit d 42.9  From 4/19 showed    gluc 101, cr 0.73, gfr>60, alt 21, hba1c 6.2,          chol 164, tg 76,   hdl 79, ldl-c 70,  wbc 3.1, hb 13.5, plt 162, umar 7.0, vit d 37.3  From 10/19 showed  gluc 106, cr 0.78, gfr>60,    hba1c 6.5    Patient Active Problem List   Diagnosis Code    Hyperlipidemia E78.5    Peripheral neuropathy Dr. Tucker Clemons, Dr. Jonathan Daniels G62.9    Hypovitaminosis D 2012 E55.9    Arthritis, degenerative M19.90    CAD s/p LAD stent 2007 Dr Star Keller I25.10    Advance directive discussed with patient Z71.89    Colon polyp Dr Cece Stanford 7/15 K63.5    Sinus bradycardia R00.1    Controlled type 2 diabetes mellitus with diabetic neuropathy, without long-term current use of insulin (HCC) E11.40    GERD without esophagitis K21.9     Assessment and plan:  1. CHD. F/U Dr. Star Keller, continue current regimen  2. Nephrolithiasis. Hydration  3. Neuropathy. F/U neurology as needed, stable  4. Hypovit D. Check level next draw  5. DM. Continue current regimen and doing well, f/u Dr Cecelia Wade  6. Dyslipidemia.   Continue current regimen. 7.  GERD. Avoidance measures, ppi as he is doing      RTC 4/20    Above conditions discussed at length and patient vocalized understanding.   All questions answered to patient satisfaction

## 2019-10-28 ENCOUNTER — OFFICE VISIT (OUTPATIENT)
Dept: INTERNAL MEDICINE CLINIC | Age: 74
End: 2019-10-28

## 2019-10-28 VITALS
HEIGHT: 71 IN | HEART RATE: 72 BPM | RESPIRATION RATE: 14 BRPM | TEMPERATURE: 97.8 F | SYSTOLIC BLOOD PRESSURE: 122 MMHG | BODY MASS INDEX: 23.52 KG/M2 | OXYGEN SATURATION: 97 % | WEIGHT: 168 LBS | DIASTOLIC BLOOD PRESSURE: 74 MMHG

## 2019-10-28 DIAGNOSIS — K63.5 HYPERPLASTIC COLONIC POLYP, UNSPECIFIED PART OF COLON: ICD-10-CM

## 2019-10-28 DIAGNOSIS — E11.40 CONTROLLED TYPE 2 DIABETES MELLITUS WITH DIABETIC NEUROPATHY, WITHOUT LONG-TERM CURRENT USE OF INSULIN (HCC): ICD-10-CM

## 2019-10-28 DIAGNOSIS — I25.10 CORONARY ARTERY DISEASE INVOLVING NATIVE CORONARY ARTERY OF NATIVE HEART WITHOUT ANGINA PECTORIS: ICD-10-CM

## 2019-10-28 DIAGNOSIS — Z71.89 ADVANCED DIRECTIVES, COUNSELING/DISCUSSION: ICD-10-CM

## 2019-10-28 DIAGNOSIS — E55.9 HYPOVITAMINOSIS D: ICD-10-CM

## 2019-10-28 DIAGNOSIS — Z12.5 SPECIAL SCREENING FOR MALIGNANT NEOPLASM OF PROSTATE: ICD-10-CM

## 2019-10-28 DIAGNOSIS — K21.9 GERD WITHOUT ESOPHAGITIS: ICD-10-CM

## 2019-10-28 DIAGNOSIS — Z13.31 SCREENING FOR DEPRESSION: ICD-10-CM

## 2019-10-28 DIAGNOSIS — Z13.39 SCREENING FOR ALCOHOLISM: ICD-10-CM

## 2019-10-28 DIAGNOSIS — Z00.00 MEDICARE ANNUAL WELLNESS VISIT, SUBSEQUENT: Primary | ICD-10-CM

## 2019-10-28 DIAGNOSIS — E78.5 HYPERLIPIDEMIA, UNSPECIFIED HYPERLIPIDEMIA TYPE: ICD-10-CM

## 2019-10-28 DIAGNOSIS — G62.9 PERIPHERAL POLYNEUROPATHY: ICD-10-CM

## 2019-10-28 NOTE — PROGRESS NOTES
pls print out the lab orders and mail to pt for next visit - will get done at [address]

## 2019-10-28 NOTE — PATIENT INSTRUCTIONS
Medicare Wellness Visit, Male The best way to live healthy is to have a lifestyle where you eat a well-balanced diet, exercise regularly, limit alcohol use, and quit all forms of tobacco/nicotine, if applicable. Regular preventive services are another way to keep healthy. Preventive services (vaccines, screening tests, monitoring & exams) can help personalize your care plan, which helps you manage your own care. Screening tests can find health problems at the earliest stages, when they are easiest to treat. [facility] follows the current, evidence-based guidelines published by the [employer] ([facility]) when recommending preventive services for our patients. Because we follow these guidelines, sometimes recommendations change over time as research supports it. (For example, a prostate screening blood test is no longer routinely recommended for men with no symptoms). Of course, you and your doctor may decide to screen more often for some diseases, based on your risk and co-morbidities (chronic disease you are already diagnosed with). Preventive services for you include: - Medicare offers their members a free annual wellness visit, which is time for you and your primary care provider to discuss and plan for your preventive service needs. Take advantage of this benefit every year! 
-All adults over age 72 should receive the recommended pneumonia vaccines. Current USPSTF guidelines recommend a series of two vaccines for the best pneumonia protection.  
-All adults should have a flu vaccine yearly and tetanus vaccine every 10 years. 
-All adults age 48 and older should receive the shingles vaccines (series of two vaccines).       
-All adults age 38-68 who are overweight should have a diabetes screening test once every three years.  
-Other screening tests & preventive services for persons with diabetes include: an eye exam to screen for diabetic retinopathy, a kidney function test, a foot exam, and stricter control over your cholesterol.  
-Cardiovascular screening for adults with routine risk involves an electrocardiogram (ECG) at intervals determined by the provider.  
-Colorectal cancer screening should be done for adults age 54-65 with no increased risk factors for colorectal cancer. There are a number of acceptable methods of screening for this type of cancer. Each test has its own benefits and drawbacks. Discuss with your provider what is most appropriate for you during your annual wellness visit. The different tests include: colonoscopy (considered the best screening method), a fecal occult blood test, a fecal DNA test, and sigmoidoscopy. 
-All adults born between [facility] should be screened once for Hepatitis C. 
-An Abdominal Aortic Aneurysm (AAA) Screening is recommended for men age 73-68 who has ever smoked in their lifetime. Here is a list of your current Health Maintenance items (your personalized list of preventive services) with a due date: 
Health Maintenance Due Topic Date Due  
 Annual Well Visit  10/18/2019

## 2019-10-28 NOTE — PROGRESS NOTES
Lobo Leonard presents today for   Chief Complaint   Patient presents with    Diabetes     physical with labs              Depression Screening:  3 most recent PHQ Screens 4/12/2019   Little interest or pleasure in doing things Not at all   Feeling down, depressed, irritable, or hopeless Not at all   Total Score PHQ 2 0       Learning Assessment:  Learning Assessment 9/12/2017   PRIMARY LEARNER Patient   PRIMARY LANGUAGE ENGLISH   LEARNER PREFERENCE PRIMARY DEMONSTRATION   ANSWERED BY Patient   RELATIONSHIP SELF       Abuse Screening:  Abuse Screening Questionnaire 4/12/2019   Do you ever feel afraid of your partner? N   Are you in a relationship with someone who physically or mentally threatens you? N   Is it safe for you to go home? Y       Fall Risk  Fall Risk Assessment, last 12 mths 4/12/2019   Able to walk? Yes   Fall in past 12 months? No   Fall with injury? -   Number of falls in past 12 months -           Coordination of Care:  1. Have you been to the ER, urgent care clinic since your last visit? Hospitalized since your last visit? no    2. Have you seen or consulted any other health care providers outside of the [address] [place] [address] since your last visit? Include any pap smears or colon screening.  no

## 2019-10-28 NOTE — ACP (ADVANCE CARE PLANNING)
Advance Care Planning    Advance Care Planning (ACP) Provider Note - Comprehensive     Date of ACP Conversation: 10/28/19  Persons included in Conversation:  patient  Length of ACP Conversation in minutes:  16 minutes    Authorized Decision Maker (if patient is incapable of making informed decisions): This person is: wife  Healthcare Agent/Medical Power of  under Advance Directive          General ACP for ALL Patients with Decision Making Capacity:   Importance of advance care planning, including choosing a healthcare agent to communicate patient's healthcare decisions if patient lost the ability to make decisions, such as after a sudden illness or accident  Understanding of the healthcare agent role was assessed and information provided    Review of Existing Advance Directive:  Does this advance directive still reflect your preferences?   Yes (Provide new form/Refer for assistance in updating)    For Serious or Chronic Illness:  Understanding of medical condition      Interventions Provided:  Recommended communicating the plan and making copies for the healthcare agent, personal physician, and others as appropriate (e.g., health system)  Recommended review of completed ACP document annually or upon change in health status

## 2019-11-19 ENCOUNTER — TELEPHONE (OUTPATIENT)
Dept: CARDIOLOGY CLINIC | Age: 74
End: 2019-11-19

## 2019-11-19 DIAGNOSIS — R55 SYNCOPE, UNSPECIFIED SYNCOPE TYPE: Primary | ICD-10-CM

## 2019-11-19 NOTE — TELEPHONE ENCOUNTER
Patient called in stating he experienced a syncope episode is unaware how long it lasted, stating before episode he was cold, dizzy and sweating, family called 911 patient did not go to ER, reason being he states his vitals was WNL. Reviewed with Dr. Elva Rowe. Verbal order and read back per Alessandra Brand MD  Move patient stress test up. Order Echocardiogram for syncope. This has been fully explained to the patient, who indicates understanding.

## 2019-11-21 ENCOUNTER — HOSPITAL ENCOUNTER (OUTPATIENT)
Dept: NON INVASIVE DIAGNOSTICS | Age: 74
Discharge: HOME | End: 2019-11-21
Attending: INTERNAL MEDICINE
Payer: MEDICARE

## 2019-11-21 DIAGNOSIS — R06.02 SOB (SHORTNESS OF BREATH): ICD-10-CM

## 2019-11-21 LAB
STRESS BASELINE DIAS BP: 78 MMHG
STRESS BASELINE HR: 57 BPM
STRESS BASELINE SYS BP: 123 MMHG
STRESS ESTIMATED WORKLOAD: 1 METS
STRESS EXERCISE DUR MIN: NORMAL
STRESS PEAK DIAS BP: 78 MMHG
STRESS PEAK SYS BP: 134 MMHG
STRESS PERCENT HR ACHIEVED: 72 %
STRESS POST PEAK HR: 105 BPM
STRESS RATE PRESSURE PRODUCT: NORMAL BPM*MMHG
STRESS ST DEPRESSION: 0 MM
STRESS ST ELEVATION: 0 MM
STRESS STAGE 1 BP: NORMAL MMHG
STRESS STAGE 1 DURATION: NORMAL MIN:SEC
STRESS STAGE 1 HR: 105 BPM
STRESS STAGE 2 BP: NORMAL MMHG
STRESS STAGE 2 DURATION: NORMAL MIN:SEC
STRESS STAGE 2 HR: 100 BPM
STRESS STAGE 3 BP: NORMAL MMHG
STRESS STAGE 3 DURATION: NORMAL MIN:SEC
STRESS STAGE 3 HR: 89 BPM
STRESS STAGE RECOVERY 1 BP: NORMAL MMHG
STRESS STAGE RECOVERY 1 DURATION: NORMAL MIN:SEC
STRESS STAGE RECOVERY 1 HR: 88 BPM
STRESS TARGET HR: 145 BPM

## 2019-11-21 PROCEDURE — 93017 CV STRESS TEST TRACING ONLY: CPT

## 2019-11-21 PROCEDURE — 74011250636 HC RX REV CODE- 250/636: Performed by: INTERNAL MEDICINE

## 2019-11-21 RX ORDER — SODIUM CHLORIDE 9 MG/ML
50 INJECTION, SOLUTION INTRAVENOUS CONTINUOUS
Status: DISCONTINUED | OUTPATIENT
Start: 2019-11-21 | End: 2019-11-25 | Stop reason: HOSPADM

## 2019-11-21 RX ADMIN — SODIUM CHLORIDE 50 ML/HR: 900 INJECTION, SOLUTION INTRAVENOUS at 09:27

## 2019-11-21 RX ADMIN — REGADENOSON 0.4 MG: 0.08 INJECTION, SOLUTION INTRAVENOUS at 09:27

## 2019-11-21 NOTE — PROGRESS NOTES
Patient was given 10. 6mCi of 99mTc Sestamibi for the resting images. Patient was also given 33. 0mCi 99mTc Sestamibi for the stress images  Injected 0.4mg Lexiscan. Patient's armband was removed and shredded.

## 2019-11-22 NOTE — PROGRESS NOTES
Per your last note\"  He remains active physically. He remains symptomatically without chest pain. However, he has never had exertional chest pains even when he has significant LAD disease. Therefore, we will proceed with repeat exercise nuclear scan; his last study was in 2017 and reported to be unremarkable. He does have progressive increasing shortness of breath with exertion.

## 2019-11-26 ENCOUNTER — HOSPITAL ENCOUNTER (OUTPATIENT)
Dept: NON INVASIVE DIAGNOSTICS | Age: 74
Discharge: HOME | End: 2019-11-26
Attending: INTERNAL MEDICINE
Payer: MEDICARE

## 2019-11-26 VITALS
HEIGHT: 71 IN | DIASTOLIC BLOOD PRESSURE: 74 MMHG | SYSTOLIC BLOOD PRESSURE: 122 MMHG | BODY MASS INDEX: 23.52 KG/M2 | WEIGHT: 168 LBS

## 2019-11-26 DIAGNOSIS — R55 SYNCOPE, UNSPECIFIED SYNCOPE TYPE: ICD-10-CM

## 2019-11-26 LAB
ECHO AO ROOT DIAM: 3.23 CM
ECHO LA AREA 4C: 16.8 CM2
ECHO LA VOL 2C: 46.87 ML (ref 18–58)
ECHO LA VOL 4C: 33.49 ML (ref 18–58)
ECHO LA VOL BP: 46.21 ML (ref 18–58)
ECHO LA VOL/BSA BIPLANE: 23.6 ML/M2 (ref 16–28)
ECHO LA VOLUME INDEX A2C: 23.93 ML/M2 (ref 16–28)
ECHO LA VOLUME INDEX A4C: 17.1 ML/M2 (ref 16–28)
ECHO LV E' LATERAL VELOCITY: 6.77 CM/S
ECHO LV E' SEPTAL VELOCITY: 8 CM/S
ECHO LV EDV TEICHHOLZ: 0.55 ML
ECHO LV ESV TEICHHOLZ: 0.23 ML
ECHO LV INTERNAL DIMENSION DIASTOLIC: 4.65 CM (ref 4.2–5.9)
ECHO LV INTERNAL DIMENSION SYSTOLIC: 3.22 CM
ECHO LV IVSD: 0.83 CM (ref 0.6–1)
ECHO LV MASS 2D: 140.8 G (ref 88–224)
ECHO LV MASS INDEX 2D: 71.9 G/M2 (ref 49–115)
ECHO LV POSTERIOR WALL DIASTOLIC: 0.81 CM (ref 0.6–1)
ECHO LVOT DIAM: 2.15 CM
ECHO LVOT PEAK GRADIENT: 3.6 MMHG
ECHO LVOT PEAK VELOCITY: 95.5 CM/S
ECHO LVOT SV: 69.4 ML
ECHO LVOT VTI: 19.18 CM
ECHO MV A VELOCITY: 55.86 CM/S
ECHO MV E DECELERATION TIME (DT): 285.9 MS
ECHO MV E VELOCITY: 47.71 CM/S
ECHO MV E/A RATIO: 0.85
ECHO MV E/E' LATERAL: 7.05
ECHO MV E/E' RATIO (AVERAGED): 6.51
ECHO MV E/E' SEPTAL: 5.96
ECHO RV TAPSE: 2.21 CM (ref 1.5–2)
ECHO TV REGURGITANT MAX VELOCITY: 232.84 CM/S
ECHO TV REGURGITANT PEAK GRADIENT: 21.7 MMHG
LVFS 2D: 30.74 %
LVOT MG: 2.03 MMHG
LVOT MV: 0.67 CM/S
LVSV (TEICH): 29.75 ML
MV DEC SLOPE: 1.67

## 2019-11-26 PROCEDURE — 93306 TTE W/DOPPLER COMPLETE: CPT

## 2019-12-06 ENCOUNTER — TELEPHONE (OUTPATIENT)
Dept: CARDIOLOGY CLINIC | Age: 74
End: 2019-12-06

## 2019-12-06 NOTE — TELEPHONE ENCOUNTER
----- Message from [address] Abbey Croft MD sent at 12/3/2019  3:26 PM EST -----  His echocardiogram looks fine. ----- Message -----  From: Abelino Martinez LPN  Sent: 34/3/4064   8:43 AM EST  To: [address] Abbey Croft MD    Per your last note\"   He remains active physically. He remains symptomatically without chest pain. However, he has never had exertional chest pains even when he has significant LAD disease. Therefore, we will proceed with repeat exercise nuclear scan; his last study was in 2017 and reported to be unremarkable. He does have progressive increasing shortness of breath with exertion.

## 2019-12-09 DIAGNOSIS — I25.10 CORONARY ARTERY DISEASE INVOLVING NATIVE CORONARY ARTERY OF NATIVE HEART WITHOUT ANGINA PECTORIS: ICD-10-CM

## 2019-12-09 NOTE — TELEPHONE ENCOUNTER
Last Visit: 10/28/19 with MD Dann Dominguez  Next Appointment: 4/29/20 with MD Dann Dominguez    Requested Prescriptions     Pending Prescriptions Disp Refills    metoprolol succinate (TOPROL XL) 25 mg XL tablet 90 Tab 3     Sig: Take 1 Tab by mouth daily.

## 2019-12-10 RX ORDER — METOPROLOL SUCCINATE 25 MG/1
25 TABLET, EXTENDED RELEASE ORAL DAILY
Qty: 90 TAB | Refills: 3 | Status: SHIPPED | OUTPATIENT
Start: 2019-12-10 | End: 2021-01-06 | Stop reason: SDUPTHER

## 2020-01-10 DIAGNOSIS — E11.40 CONTROLLED TYPE 2 DIABETES MELLITUS WITH DIABETIC NEUROPATHY, WITHOUT LONG-TERM CURRENT USE OF INSULIN (HCC): ICD-10-CM

## 2020-01-10 NOTE — TELEPHONE ENCOUNTER
Last Visit: 10/28/19 with MD Anders Rosas  Next Appointment: 4/29/20 with MD Anders Rosas  Previous Refill Encounter(s): 12/31/18 #100 with 3 refills    Requested Prescriptions     Pending Prescriptions Disp Refills    glucose blood VI test strips (PRECISION XTRA TEST) strip 100 Strip 3     Sig: Pt to test blood sugar once daily.   Dx: E11.9

## 2020-01-30 NOTE — PROGRESS NOTES
1. Have you been to the ER, urgent care clinic or hospitalized since your last visit? YES.  4 weeks ago to at [facility]. 2 weeks [address] [facility]. March in [place]    2. Have you seen or consulted any other health care providers outside of the [address] [place] [address] since your last visit (Include any pap smears or colon screening)? NO      Do you have an Advanced Directive? YES    Would you like information on Advanced Directives?  NO Problem: Alteration in Thoughts and Perception  Goal: Verbalize thoughts and feelings  Description  Interventions:  - Promote a nonjudgmental and trusting relationship with the patient through active listening and therapeutic communication  - Assess patient's level of functioning, behavior and potential for risk  - Engage patient in 1 on 1 interactions  - Encourage patient to express fears, feelings, frustrations, and discuss symptoms    - [place] patient to reality, help patient recognize reality-based thinking   - Administer medications as ordered and assess for potential side effects  - Provide the patient education related to the signs and symptoms of the illness and desired effects of prescribed medications  Outcome: Progressing  Goal: Agree to be compliant with medication regime, as prescribed and report medication side effects  Description  Interventions:  - Offer appropriate PRN medication and supervise ingestion; conduct AIMS, as needed   Outcome: Progressing  Goal: Attend and participate in unit activities, including therapeutic, recreational, and educational groups  Description  Interventions:  -Encourage Visitation and family involvement in care  Outcome: Progressing  Goal: Complete daily ADLs, including personal hygiene independently, as able  Description  Interventions:  - Observe, teach, and assist patient with ADLS  - Monitor and promote a balance of rest/activity, with adequate nutrition and elimination   Outcome: Progressing     Problem: Ineffective Coping  Goal: Understands least restrictive measures  Description  Interventions:  - Utilize least restrictive behavior  Outcome: Progressing     Problem: GASTROINTESTINAL - ADULT  Goal: Maintains adequate nutritional intake  Description  INTERVENTIONS:  - Monitor percentage of each meal consumed  - Identify factors contributing to decreased intake, treat as appropriate  - Assist with meals as needed  - Monitor I&O, weight, and lab values if indicated  - Obtain nutrition services referral as needed  Outcome: Kirk Cummins has been awake, alert, and visible intermittently out in the milieu  Pt went to [facility] with staff and peers  Monitoring pt q 1 hour with drinking water  Pt completed his daily ADL's but remains disheveled and dressed in layers  Ate 100% supper  Compliant with all scheduled meds with some prompting  Pt called hospital  and reported that staff is not helping him while staff was working with a pt in crisis  Had evening snack  Denies any depression or anxiety  Remains focused on water and discharge  Continue to monitor/assess for any changes

## 2020-02-05 ENCOUNTER — HOSPITAL ENCOUNTER (OUTPATIENT)
Dept: LAB | Age: 75
Discharge: HOME | End: 2020-02-05
Payer: MEDICARE

## 2020-02-05 PROCEDURE — 88305 TISSUE EXAM BY PATHOLOGIST: CPT

## 2020-04-28 DIAGNOSIS — E11.40 CONTROLLED TYPE 2 DIABETES MELLITUS WITH DIABETIC NEUROPATHY, WITHOUT LONG-TERM CURRENT USE OF INSULIN (HCC): ICD-10-CM

## 2020-04-28 RX ORDER — PANTOPRAZOLE SODIUM 40 MG/1
40 TABLET, DELAYED RELEASE ORAL DAILY
Qty: 90 TAB | Refills: 3 | Status: SHIPPED | OUTPATIENT
Start: 2020-04-28 | End: 2021-05-17 | Stop reason: SDUPTHER

## 2020-04-28 RX ORDER — LANCETS
EACH MISCELLANEOUS
Qty: 1 EACH | Refills: 11 | Status: SHIPPED | OUTPATIENT
Start: 2020-04-28 | End: 2021-06-29 | Stop reason: SDUPTHER

## 2020-04-28 NOTE — TELEPHONE ENCOUNTER
Precesion Test strips was sent to [facility] on 01/10/2020 for #100 with 3 refills. Last visit 11/28/2019 MD Lenita Phoenix   Next appointment 06/15/2020 MD Lenita Phoenix   Previous refill encounter(s) 03/04/2019 Protonix #90 with 3 refills, 04/12/2019 Lancets #1 with 11 refills. Requested Prescriptions     Pending Prescriptions Disp Refills    pantoprazole (PROTONIX) 40 mg tablet       Sig: Take 1 Tab by mouth as needed.     lancets misc 1 Each 11     Sig: Use daily as directed

## 2020-06-03 LAB
CREATININE, EXTERNAL: 0.76
HBA1C MFR BLD HPLC: 6.5 %
LDL-C, EXTERNAL: 80
MICROALBUMIN UR TEST STR-MCNC: <6 MG/DL

## 2020-06-11 ENCOUNTER — TELEPHONE (OUTPATIENT)
Dept: INTERNAL MEDICINE CLINIC | Age: 75
End: 2020-06-11

## 2020-06-11 DIAGNOSIS — E11.40 CONTROLLED TYPE 2 DIABETES MELLITUS WITH DIABETIC NEUROPATHY, WITHOUT LONG-TERM CURRENT USE OF INSULIN (HCC): Primary | ICD-10-CM

## 2020-06-11 RX ORDER — METFORMIN HYDROCHLORIDE 500 MG/1
500 TABLET, EXTENDED RELEASE ORAL
Qty: 90 TAB | Refills: 3 | Status: CANCELLED | OUTPATIENT
Start: 2020-06-11

## 2020-06-11 RX ORDER — METFORMIN HYDROCHLORIDE 500 MG/1
500 TABLET ORAL 2 TIMES DAILY WITH MEALS
Qty: 180 TAB | Refills: 3 | Status: SHIPPED | OUTPATIENT
Start: 2020-06-11 | End: 2021-03-03 | Stop reason: SDUPTHER

## 2020-06-11 NOTE — TELEPHONE ENCOUNTER
Pt  he had labs done at [facility] , for his 6 mo f/up that is sched for 06/15- pt asking if [facility] faxed results to you ? Deepti Arriaza Pt said he has them and if it is ok that he reads the results during his virtual appt?  Or do you want [facility] to come in for an OV .please advise

## 2020-06-11 NOTE — TELEPHONE ENCOUNTER
We have the results  Keep appt virtual for everyone's safety  We can send him copy if he doesn't have it

## 2020-06-15 ENCOUNTER — VIRTUAL VISIT (OUTPATIENT)
Dept: INTERNAL MEDICINE CLINIC | Age: 75
End: 2020-06-15

## 2020-06-15 DIAGNOSIS — K21.9 GERD WITHOUT ESOPHAGITIS: ICD-10-CM

## 2020-06-15 DIAGNOSIS — G62.9 PERIPHERAL POLYNEUROPATHY: ICD-10-CM

## 2020-06-15 DIAGNOSIS — I25.10 CORONARY ARTERY DISEASE INVOLVING NATIVE CORONARY ARTERY OF NATIVE HEART WITHOUT ANGINA PECTORIS: ICD-10-CM

## 2020-06-15 DIAGNOSIS — K63.5 HYPERPLASTIC COLONIC POLYP, UNSPECIFIED PART OF COLON: ICD-10-CM

## 2020-06-15 DIAGNOSIS — E11.40 CONTROLLED TYPE 2 DIABETES MELLITUS WITH DIABETIC NEUROPATHY, WITHOUT LONG-TERM CURRENT USE OF INSULIN (HCC): Primary | ICD-10-CM

## 2020-06-15 DIAGNOSIS — E78.5 HYPERLIPIDEMIA, UNSPECIFIED HYPERLIPIDEMIA TYPE: ICD-10-CM

## 2020-06-15 NOTE — PROGRESS NOTES
Bisi Jones is a 67 y.o. male evaluated via audio only technology on 6/15/2020. Consent: He and/or his health care decision maker is aware that he may receive a bill for this audio only encounter, depending on his insurance coverage, and has provided verbal consent to proceed: Yes    I communicated with the patient and/or health care decision maker about the nature and details of the following:  Assessment & Plan:   Diagnoses and all orders for this visit:    1. Coronary artery disease involving native coronary artery of native heart without angina pectoris    2. Hyperplastic colonic polyp, unspecified part of colon    3. GERD without esophagitis    4. Controlled type 2 diabetes mellitus with diabetic neuropathy, without long-term current use of insulin ([facility] [address]    5. Peripheral polyneuropathy    6. Hyperlipidemia, unspecified hyperlipidemia type        12  Subjective:   Bisi Jones is a 67 y.o. male who was seen for No chief complaint on file. I affirm this is a Patient-Initiated Episode with a Patient who has not had a related appointment within my department in the past 7 days or scheduled within the next 24 hours. Total Time: 21-30 min    Note: not billable if this call serves to triage the patient into an appointment for the relevant concern      Lorrie Hutchison MD     No cardiovascular complaints and he continues to walk 2mi about 5x/week through the pandemic  . No polyuria, polydipsia, nocturia, vision change. FBS <100 mostly, not checking pps, weight is stable by report    No gi or gu issues, taking the ppi just about daily for convenience    The neuropathy sx are about the same limited to the feet and no progression over the years    [address]: 3/28/14, 4/10/15, 4/13/16, 10/2/17, 10/17/18, 10/28/19    Past Medical History:   Diagnosis Date    Atypical chest pain     2007, 2015    Cardiac catheterization 07/31/2014    LAD diffuse 20-30%. Prior pLAD stent patent. Otherwise < 20% coronary artery disease. LVEDP 10 mmHg. EF 55%. Minimal anteroapical hypk.  Cardiac echocardiogram 12/23/2013    EF 55-60%. No WMA. BRANDIE. Mild MR. Mild TR. No significant valvular heart disease.  Cardiac nuclear imaging test 12/23/2013    No evidence of ischemia or prior infarction. EF 63%. No RWMA. Neg EKG on max EST. Ex time 10 min 48 sec.  Carotid duplex 04/08/2016    Mild <50% bilateral ICA stenosis.  Colon polyps 2015    Dr Yves Denton [facility]    Diabetes mellitus ([facility] [address] 1/15    on basis of hba1c    Dyslipidemia     GERD (gastroesophageal reflux disease) 10/2017    Gilbert's syndrome     Gout 2002    Hypovitaminosis D 2012     Lung nodule 09/2017    on CT; questionable 8mm RUL, neg aneurysm; subsequent CT 3/18 showed no nodule, just atelectasis    Nephrolithiasis     ureteral stone 2004 Dr. Tirso Sandoval, lithotripsy [facility] 2005    Osteoarthritis     Peripheral neuropathy Dr. Evans Morales, Dr. Ming Perez 10/11    Venous insufficiency s/p laser vein ablation Dr. Joey Gutierrez 7/12     LEFT leg     Past Surgical History:   Procedure Laterality Date    ABDOMEN SURGERY PROC UNLISTED  10/2017    MRI abd neg    CARDIAC SURG PROCEDURE UNLIST      NST negative, ef 63% (12/13); stress echo neg, ef 57% [facility] (5/17); NST neg, ef 57% (9/17)    CARDIAC SURG PROCEDURE UNLIST  12/13    echo nl lv, ef 60%, mild MR, mild TR    HX APPENDECTOMY      HX COLONOSCOPY      Dr Kunal Carballo 1/05 negative; Dr Ranulfo Ernandez polyps 7/15    HX CORONARY STENT PLACEMENT      severe 95% LAD disease stented to residual 0%.     HX GI      US abd negative 1/08; MRI abd neg 10/17    HX LITHOTRIPSY  07/05    HX ORTHOPAEDIC  2007    LEFT meniscus tear Dr. Kumar Gambino  [address]      surgery for undescended testicles    VASCULAR SURGERY PROCEDURE UNLIST  2006    neg community screening for PAD/AAA    VASCULAR SURGERY PROCEDURE UNLIST  2008    BICA<50%      Social History     Socioeconomic History    Marital status:  Spouse name: Not on file    Number of children: 2    Years of education: Not on file    Highest education level: Not on file   Occupational History    Occupation:      Employer: RETIRED   Social Needs    Financial resource strain: Not on file    Food insecurity     Worry: Not on file     Inability: Not on file   [employer] needs     Medical: Not on file     Non-medical: Not on file   Tobacco Use    Smoking status: Never Smoker    Smokeless tobacco: Never Used   Substance and Sexual Activity    Alcohol use: Yes     Comment: rarely    Drug use: No    Sexual activity: Yes     Partners: Female     Comment: Wife   Lifestyle    Physical activity     Days per week: Not on file     Minutes per session: Not on file    Stress: Not on file   Relationships    Social connections     Talks on phone: Not on file     Gets together: Not on file     Attends Cheondo[employer] service: Not on file     Active member of club or organization: Not on file     Attends meetings of clubs or organizations: Not on file     Relationship status: Not on file    Intimate partner violence     Fear of current or ex partner: Not on file     Emotionally abused: Not on file     Physically abused: Not on file     Forced sexual activity: Not on file   Other Topics Concern    Not on file   Social History Narrative    Not on file     Current Outpatient Medications   Medication Sig    metFORMIN (GLUCOPHAGE) 500 mg tablet Take 1 Tab by mouth two (2) times daily (with meals).  pantoprazole (PROTONIX) 40 mg tablet Take 1 Tab by mouth daily.  lancets misc Use daily as directed    glucose blood VI test strips (PRECISION XTRA TEST) strip Pt to test blood sugar once daily. Dx: E11.9    metoprolol succinate (TOPROL XL) 25 mg XL tablet Take 1 Tab by mouth daily.  atorvastatin (LIPITOR) 80 mg tablet Take 1 Tab by mouth daily.  clopidogrel (PLAVIX) 75 mg tab Take 1 Tab by mouth daily.     ezetimibe (ZETIA) 10 mg tablet Take 1 Tab by mouth daily.  nitroglycerin (NITROSTAT) 0.4 mg SL tablet 1 Tab by SubLINGual route every five (5) minutes as needed for Chest Pain. Up to 3 doses.  Cholecalciferol, Vitamin D3, 5,000 unit Tab Take  by mouth daily.  Comp. Stocking,Thigh,Long,X-Sml Misc 1 Package by Does Not Apply route daily. 30-40 mm/hg (Patient taking differently: 1 Package by Does Not Apply route daily. 30-40 mm/hg)    UBIDECARENONE (COENZYME Q10 PO) Take  by mouth daily.  aspirin 81 mg tablet Take 81 mg by mouth daily. No current facility-administered medications for this visit.       REVIEW OF SYSTEMS: sees Dr. Rufus Sheldon 2015 Dr Angelika Duvall at [facility], no podiatry  Ophtho  no vision change or eye pain  Oral  no mouth pain, tongue or tooth problems  Ears  no hearing loss, ear pain, fullness  Throat  no swallowing problems  Cardiac  no CP, PND, orthopnea, edema, palpitations or syncope  Chest  no breast masses  Resp  no wheezing, chronic coughing, dyspnea  GI  no heartburn, nausea, vomiting, change in bowel habits, bleeding, hemorrhoids  Urinary  no dysuria, hematuria, flank pain, urgency, frequency    LABS  From 1/11 showed    gluc 104, cr 0.90,             alt  38,                                   chol 173, tg 82, hdl 49, ldl-c 108, psa 0.90  From 7/11 showed                                         ck 48, javed 7.7  From 2/12 showed    gluc 108                                                   b12 409, fol 13.7, mma 116, homo 8.9, nl esr, shannan neg, nl tsh, 2 hr gtt 98   From 4/12 showed        hba1c 6.2,                 chol 155, tg 93,   hdl 68, ldl-c 68  From 10/12 showed  gluc 98,   cr 0.80,             alt 29, hba1c 6.4  From 3/13 showed    gluc 89,   cr 0.80, gfr 91,  alt 14,                   ldl-p 867, chol 160, tg 50,   hdl 75, ldl-c 75,   psa 0.80  From 8/13 showed                                chol 167, tg 57,   hdl 79, ldl-c 77,  wbc 3.7, hb 13.1, plt 154, vit d 53  From 3/14 showed    gluc 103, cr 0.80, gfr 91,  alt 47, hba1c 6.2,               chol 164, tg 74,   hdl 74, ldl-c 72,  wbc 8.6, hb 14.0, plt 186  From 7/14 showed    gluc 89,   cr 0.94, gfr>60,     hba1c 6.4,               chol 157, tg 87,   hdl 74, ldl-c 67,  wbc 4.3, hb 13.4, plt 168,         ck/trop neg  From 9/14 showed         hba1c 6.2,               psa 0.76,    ua neg  From 3/15 showed    gluc 105, cr 0.80,      alt 34, hba1c 6.4,    chol 175, tg 76,   hdl 78, ldl-c 82,   wbc 3.9, hb 14.5, plt 176  From 4/15 showed                     wbc 2.4,               plt 95  From 6/15 showed                     wbc 3.8, hb 14.4, plt 123,         fe 90, %sat 30, feritin 334, b12 365, fol 14.7   From 9/15 showed         hba1c 6.3,    chol 165, tg 91,   hdl 63, ldl-c 84  From 1/16 showed    gluc 114, cr 0.80, gfr 90,  alt 63, hba1c 6.5,    chol 166, tg 100, hdl 73, ldl-c 73,  wbc 3.9, hb 14.5, plt 165  From 4/16 showed         hba1c 6.5,    chol 164, tg 61,   hdl 75, ldl-c 77,      umar 4.0  From 1/17 showed    gluc 101, cr 0.79, gfr>60, alt 33, hba1c 6.5,     chol 155, tg 70,   hdl 75, ldl-c 66,      umar 6.0  From 9/17 showed         hba1c 6.2,    chol 150, tg 77,   hdl 63, ldl-c 72,  wbc 3.5, hb 13.3, plt 160  From 4/18 showed    gluc 107, cr 0.83, gfr>60, alt 35, hba1c 6.2,            umar 9.5,  psa 1.06  From 10/18 showed         hba1c 6.6,    chol 172, tg 85,   hdl 79, ldl-c 76,  wbc 3.9, hb 14.2, plt 172,        vit d 42.9  From 4/19 showed    gluc 101, cr 0.73, gfr>60, alt 21, hba1c 6.2,          chol 164, tg 76,   hdl 79, ldl-c 70,  wbc 3.1, hb 13.5, plt 162, umar 7.0,  vit d 37.3  From 10/19 showed  gluc 106, cr 0.78, gfr>60,    hba1c 6.5  From 6/20 showed    gluc 106, cr 0.76, gfr>60, alt 24, hba1c 6.5,    chol 158, tg 70,   hdl 64, ldl-c 80,  wbc 3.5, hb 13.4, plt 178, umar neg, vit d 33.0    Patient Active Problem List   Diagnosis Code    Hyperlipidemia E78.5    Peripheral neuropathy Dr. Britta Merrill, Dr. Eliceo Burt G62.9    Hypovitaminosis D 2012 E55.9  Arthritis, degenerative M19.90    CAD s/p LAD stent 2007 Dr Agustin Bajwa I25.10    Advance directive discussed with patient Z71.89    Colon polyp Dr Izzy Song 7/15 K63.5    Sinus bradycardia R00.1    Controlled type 2 diabetes mellitus with diabetic neuropathy, without long-term current use of insulin (HCC) E11.40    GERD without esophagitis K21.9     Assessment and plan:  1. CHD. F/U Dr. Agustin Bajwa, continue current regimen  2. Nephrolithiasis. Hydration  3. Neuropathy. F/U neurology as needed, stable  4. Hypovit D. Check level next draw  5. DM. Continue current regimen and doing well, f/u Dr Baljeet Muñiz  6. Dyslipidemia. Continue current regimen. 7.  GERD. Avoidance measures, ppi as he is doing      RTC 12/20    Above conditions discussed at length and patient vocalized understanding.   All questions answered to patient satisfaction

## 2020-06-18 DIAGNOSIS — E55.9 HYPOVITAMINOSIS D: ICD-10-CM

## 2020-06-18 DIAGNOSIS — E11.40 CONTROLLED TYPE 2 DIABETES MELLITUS WITH DIABETIC NEUROPATHY, WITHOUT LONG-TERM CURRENT USE OF INSULIN (HCC): ICD-10-CM

## 2020-07-31 DIAGNOSIS — E78.5 HYPERLIPIDEMIA, UNSPECIFIED HYPERLIPIDEMIA TYPE: ICD-10-CM

## 2020-07-31 DIAGNOSIS — I25.10 CORONARY ARTERY DISEASE INVOLVING NATIVE CORONARY ARTERY OF NATIVE HEART WITHOUT ANGINA PECTORIS: ICD-10-CM

## 2020-07-31 DIAGNOSIS — I65.23 CAROTID STENOSIS, BILATERAL: ICD-10-CM

## 2020-07-31 NOTE — TELEPHONE ENCOUNTER
Last Visit: 6/15/20 with MD Brittney Monroe  Next Appointment: 12/29/20 with MD Brittney Monroe  Previous Refill Encounter(s): 6/11/19 #90 with 3 refills    Requested Prescriptions     Pending Prescriptions Disp Refills    ezetimibe (ZETIA) 10 mg tablet 90 Tab 3     Sig: Take 1 Tab by mouth daily.  clopidogreL (Plavix) 75 mg tab 90 Tab 3     Sig: Take 1 Tab by mouth daily.

## 2020-08-04 RX ORDER — EZETIMIBE 10 MG/1
10 TABLET ORAL DAILY
Qty: 90 TAB | Refills: 3 | Status: SHIPPED | OUTPATIENT
Start: 2020-08-04 | End: 2021-06-28 | Stop reason: SDUPTHER

## 2020-08-04 RX ORDER — CLOPIDOGREL BISULFATE 75 MG/1
75 TABLET ORAL DAILY
Qty: 90 TAB | Refills: 3 | Status: SHIPPED | OUTPATIENT
Start: 2020-08-04 | End: 2021-06-28 | Stop reason: SDUPTHER

## 2020-10-21 DIAGNOSIS — E78.5 HYPERLIPIDEMIA, UNSPECIFIED HYPERLIPIDEMIA TYPE: ICD-10-CM

## 2020-10-21 RX ORDER — ATORVASTATIN CALCIUM 80 MG/1
80 TABLET, FILM COATED ORAL DAILY
Qty: 90 TAB | Refills: 3 | Status: SHIPPED | OUTPATIENT
Start: 2020-10-21 | End: 2021-10-11 | Stop reason: SDUPTHER

## 2020-10-21 NOTE — TELEPHONE ENCOUNTER
Last visit 06/15/2020 Virtual visit MD Raffy Romero   Next appointment 12/29/2020 TENA Yoon   Previous refill encounter(s) 09/30/2019 Lipitor #90 with 3 refills    Requested Prescriptions     Pending Prescriptions Disp Refills    atorvastatin (LIPITOR) 80 mg tablet 90 Tab 3     Sig: Take 1 Tab by mouth daily.

## 2020-12-17 ENCOUNTER — OFFICE VISIT (OUTPATIENT)
Dept: CARDIOLOGY CLINIC | Age: 75
End: 2020-12-17
Payer: MEDICARE

## 2020-12-17 VITALS
WEIGHT: 167 LBS | DIASTOLIC BLOOD PRESSURE: 60 MMHG | HEIGHT: 71 IN | SYSTOLIC BLOOD PRESSURE: 106 MMHG | OXYGEN SATURATION: 98 % | BODY MASS INDEX: 23.38 KG/M2 | HEART RATE: 60 BPM

## 2020-12-17 DIAGNOSIS — R06.02 SOB (SHORTNESS OF BREATH): ICD-10-CM

## 2020-12-17 DIAGNOSIS — I25.10 CORONARY ARTERY DISEASE INVOLVING NATIVE CORONARY ARTERY OF NATIVE HEART WITHOUT ANGINA PECTORIS: Primary | ICD-10-CM

## 2020-12-17 DIAGNOSIS — R00.1 SINUS BRADYCARDIA: ICD-10-CM

## 2020-12-17 DIAGNOSIS — R55 SYNCOPE, UNSPECIFIED SYNCOPE TYPE: ICD-10-CM

## 2020-12-17 PROCEDURE — G8427 DOCREV CUR MEDS BY ELIG CLIN: HCPCS | Performed by: INTERNAL MEDICINE

## 2020-12-17 PROCEDURE — 99214 OFFICE O/P EST MOD 30 MIN: CPT | Performed by: INTERNAL MEDICINE

## 2020-12-17 PROCEDURE — G8420 CALC BMI NORM PARAMETERS: HCPCS | Performed by: INTERNAL MEDICINE

## 2020-12-17 PROCEDURE — G8432 DEP SCR NOT DOC, RNG: HCPCS | Performed by: INTERNAL MEDICINE

## 2020-12-17 PROCEDURE — 93000 ELECTROCARDIOGRAM COMPLETE: CPT | Performed by: INTERNAL MEDICINE

## 2020-12-17 PROCEDURE — 1101F PT FALLS ASSESS-DOCD LE1/YR: CPT | Performed by: INTERNAL MEDICINE

## 2020-12-17 PROCEDURE — G8536 NO DOC ELDER MAL SCRN: HCPCS | Performed by: INTERNAL MEDICINE

## 2020-12-17 NOTE — PROGRESS NOTES
HISTORY OF PRESENT ILLNESS  Fadi Dillon is a 67 y.o. male. ASSESSMENT and PLAN    Mr. Vince Wright has history of CAD. He has never had chest pains or angina equivalent. Back in 5388, he had 64 slice CT scan for unclear reasons. This revealed significant calcification. He subsequently underwent evaluation for CAD despite the fact that he had no symptoms. His evaluation revealed severe LAD disease. He subsequently had LAD stent performed in 2007 and has done fairly well since that time. He remains active physically. Because of recurrent episodes of chest pain, he was readmitted to [facility] in July of 2014; he subsequent underwent repeat coronary angiography which revealed patent LAD stent, without significant, occlusive CAD. Reassurance was given. He presented to the hospital in [place] with abdominal discomfort.  He was concerned about possible coronary syndrome.  This was in early part of May 2017. [facility] ruled out. [facility] had a stress echocardiogram which was reported to him as normal.  In September 2017, he presented to [facility] emergency room with atypical chest pains. Bartolome Simpson ruled out acute coronary event and subsequently discharged the patient home. [facility] has had multiple presentations to the emergency room for atypical chest pains over the last 2 years. In November 2019, he had exercise nuclear scan as well as echocardiogram.  Nuclear scan showed EF of 69% without any perfusion abnormalities. His echocardiogram shows normal LV function without wall motion abnormality.  CAD:   Please stable. His nuclear scan and echocardiogram in November 2019 were unremarkable as noted above.  BP:   Well controlled.  Rhythm:   Stable sinus rhythm.  CHF:   There is no evidence of decompensated CHF noted.  Weight:    His weight today is 167 pounds. That is at baseline.  Cholesterol:   Target LDL<70.    Lipitor 80, Zetia 10   Anti-coagulation:   Remains on aspirin and Plavix.         · CAD:    He remains active physically. He remains symptomatically without chest pain. However, he has never had exertional chest pains even when he has significant LAD disease. Therefore, we will proceed with repeat exercise nuclear scan; his last study was in 2017 and reported to be unremarkable. He does have progressive increasing shortness of breath with exertion. · BP:   Well controlled. · HR:    Stable. Asymptomatic sinus bradycardia. · CHF:    There is no evidence of decompensated CHF noted. · Weight:    His weight today is 167 pounds. His baseline weight is 169 pounds. · Cholesterol:   Target LDL <70. Lipitor 80, Zetia 10. · Anti-platelet:   Remains on ASA, and Plavix.      If the above testing is unremarkable, I will see him back annually. Thank you. Encounter Diagnoses   Name Primary?  Coronary artery disease involving native coronary artery of native heart without angina pectoris Yes    Syncope, unspecified syncope type     SOB (shortness of breath)     Sinus bradycardia      current treatment plan is effective, no change in therapy  lab results and schedule of future lab studies reviewed with patient  reviewed diet, exercise and weight control  cardiovascular risk and specific lipid/LDL goals reviewed  use of aspirin to prevent MI and TIA's discussed      HPI   Today, Mr. Newton Mondragon has no complaints of chest pains, increased shortness of breath or decreased exercise capacity. He denies any orthopnea or PND. He denies any palpitations or dizziness. He remains active physically. Review of Systems   Respiratory: Negative for shortness of breath. Cardiovascular: Negative for chest pain, palpitations, orthopnea, claudication, leg swelling and PND. All other systems reviewed and are negative. Physical Exam  Vitals signs and nursing note reviewed. Constitutional:       Appearance: Normal appearance. HENT:      Head: Normocephalic.    Eyes: Conjunctiva/sclera: Conjunctivae normal.   Neck:      Musculoskeletal: No neck rigidity. Cardiovascular:      Rate and Rhythm: Normal rate and regular rhythm. Pulmonary:      Breath sounds: Normal breath sounds. Abdominal:      Palpations: Abdomen is soft. Musculoskeletal:         General: No swelling. Skin:     General: Skin is warm and dry. Neurological:      General: No focal deficit present. Mental Status: He is alert and oriented to person, place, and time. Psychiatric:         Mood and Affect: Mood normal.         Behavior: Behavior normal.         PCP: Sary Valencia MD    Past Medical History:   Diagnosis Date    Atypical chest pain     2007, 2015    Cardiac catheterization 07/31/2014    LAD diffuse 20-30%. Prior pLAD stent patent. Otherwise < 20% coronary artery disease. LVEDP 10 mmHg. EF 55%. Minimal anteroapical hypk.  Cardiac echocardiogram 12/23/2013    EF 55-60%. No WMA. BRANDIE. Mild MR. Mild TR. No significant valvular heart disease.  Cardiac nuclear imaging test 12/23/2013    No evidence of ischemia or prior infarction. EF 63%. No RWMA. Neg EKG on max EST. Ex time 10 min 48 sec.  Carotid duplex 04/08/2016    Mild <50% bilateral ICA stenosis.       Colon polyps 2015    Dr Marcos [facility]    Diabetes mellitus ([facility] [address] 1/15    on basis of hba1c    Dyslipidemia     GERD (gastroesophageal reflux disease) 10/2017    Gilbert's syndrome     Gout 2002    Hypovitaminosis D 2012     Lung nodule 09/2017    on CT; questionable 8mm RUL, neg aneurysm; subsequent CT 3/18 showed no nodule, just atelectasis    Nephrolithiasis     ureteral stone 2004 Dr. Nahomi Norman, lithotripsy [facility] 2005    Osteoarthritis     Peripheral neuropathy Dr. Tammy Jorgensen, Dr. Thorpe Drivers 10/11    Venous insufficiency s/p laser vein ablation Dr. Patterson Deanereyda 7/12     LEFT leg       Past Surgical History:   Procedure Laterality Date    ABDOMEN SURGERY [address] UNLISTED  10/2017    MRI abd neg    CARDIAC SURG PROCEDURE UNLIST      NST negative, ef 63% (12/13); stress echo neg, ef 57% [facility] (5/17); NST neg, ef 57% (9/17)    CARDIAC SURG PROCEDURE UNLIST  12/13    echo nl lv, ef 60%, mild MR, mild TR    HX APPENDECTOMY      HX COLONOSCOPY      Dr Susan Molina 1/05 negative; Dr Jack Jimenez polyps 7/15    HX CORONARY STENT PLACEMENT      severe 95% LAD disease stented to residual 0%.  HX GI      US abd negative 1/08; MRI abd neg 10/17    HX LITHOTRIPSY  07/05    HX ORTHOPAEDIC  2007    LEFT meniscus tear Dr. Coco Madrigal [facility]      surgery for undescended testicles    VASCULAR SURGERY PROCEDURE UNLIST  2006    neg community screening for PAD/AAA    VASCULAR SURGERY PROCEDURE UNLIST  2008    BICA<50%        Current Outpatient Medications   Medication Sig Dispense Refill    atorvastatin (LIPITOR) 80 mg tablet Take 1 Tab by mouth daily. 90 Tab 3    ezetimibe (ZETIA) 10 mg tablet Take 1 Tab by mouth daily. 90 Tab 3    clopidogreL (Plavix) 75 mg tab Take 1 Tab by mouth daily. 90 Tab 3    metFORMIN (GLUCOPHAGE) 500 mg tablet Take 1 Tab by mouth two (2) times daily (with meals). 180 Tab 3    pantoprazole (PROTONIX) 40 mg tablet Take 1 Tab by mouth daily. 90 Tab 3    lancets misc Use daily as directed 1 Each 11    glucose blood VI test strips (PRECISION XTRA TEST) strip Pt to test blood sugar once daily. Dx: E11.9 100 Strip 3    metoprolol succinate (TOPROL XL) 25 mg XL tablet Take 1 Tab by mouth daily. 90 Tab 3    nitroglycerin (NITROSTAT) 0.4 mg SL tablet 1 Tab by SubLINGual route every five (5) minutes as needed for Chest Pain. Up to 3 doses. 1 Bottle 3    Cholecalciferol, Vitamin D3, 5,000 unit Tab Take  by mouth daily.  Comp. Stocking,Thigh,Long,X-Sml Misc 1 Package by Does Not Apply route daily. 30-40 mm/hg (Patient taking differently: 1 Package by Does Not Apply route daily. 30-40 mm/hg) 1 Package 1    UBIDECARENONE (COENZYME Q10 PO) Take  by mouth daily.       aspirin 81 mg tablet Take 81 mg by mouth daily. The patient has a family history of    Social History     Tobacco Use    Smoking status: Never Smoker    Smokeless tobacco: Never Used   Substance Use Topics    Alcohol use: Yes     Comment: rarely    Drug use: No       Lab Results   Component Value Date/Time    Cholesterol, total 157 07/30/2014 04:14 AM    HDL Cholesterol 74 (H) 07/30/2014 04:14 AM    LDL, calculated 65.6 07/30/2014 04:14 AM    Triglyceride 87 07/30/2014 04:14 AM    CHOL/HDL Ratio 2.1 07/30/2014 04:14 AM        BP Readings from Last 3 Encounters:   12/17/20 106/60   11/26/19 122/74   10/28/19 122/74        Pulse Readings from Last 3 Encounters:   12/17/20 60   10/28/19 72   10/01/19 (!) 57       Wt Readings from Last 3 Encounters:   12/17/20 75.8 kg (167 lb)   11/26/19 76.2 kg (168 lb)   10/28/19 76.2 kg (168 lb)         EKG: unchanged from previous tracings, normal sinus rhythm, RBBB.

## 2020-12-17 NOTE — PROGRESS NOTES
Giancarlo Villanueva presents today for   Chief Complaint   Patient presents with    Follow-up     1 year follow up - no cardiac complaints        Giancarlo Villanueva preferred language for health care discussion is english/other. Is someone accompanying this pt? no    Is the patient using any DME equipment during [address]? no    Depression Screening:  3 most recent PHQ Screens 4/12/2019   Little interest or pleasure in doing things Not at all   Feeling down, depressed, irritable, or hopeless Not at all   Total Score PHQ 2 0       Learning Assessment:  Learning Assessment 9/12/2017   PRIMARY LEARNER Patient   PRIMARY LANGUAGE ENGLISH   LEARNER PREFERENCE PRIMARY DEMONSTRATION   ANSWERED BY Patient   RELATIONSHIP SELF       Abuse Screening:  Abuse Screening Questionnaire 4/12/2019   Do you ever feel afraid of your partner? N   Are you in a relationship with someone who physically or mentally threatens you? N   Is it safe for you to go home? Y       Fall Risk  Fall Risk Assessment, last 12 mths 4/12/2019   Able to walk? Yes   Fall in past 12 months? No   Fall with injury? -   Number of falls in past 12 months -       Pt currently taking Anticoagulant therapy? ASA 81mg every day     Coordination of Care:  1. Have you been to the ER, urgent care clinic since your last visit? Hospitalized since your last visit? no    2. Have you seen or consulted any other health care providers outside of the [address] [place] [address] since your last visit? Include any pap smears or colon screening.  no

## 2020-12-25 NOTE — PROGRESS NOTES
67 y.o. WHITE OR  male who presents for evaluation. No cardiovascular complaints and he continues to walk 2mi about 5x/week through the pandemic. Saw Dr Austen Castro and no new recs. Echo and NST 11/19 as below  . No polyuria, polydipsia, nocturia, vision change. FBS <110 mostly, not checking pps, weight is unchanged    Vitals 12/29/2020 12/17/2020 11/26/2019 10/28/2019   Weight 168 lb 167 lb 168 lb 168 lb     No gi or gu issues. He has nocturia once nightly    He pulled a muscle in the left neck, no radicular sx down the arm    The neuropathy sx are about the same limited to the feet and no progression over the years    [address]: 3/28/14, 4/10/15, 4/13/16, 10/2/17, 10/17/18, 10/28/19, 12/29/20    Past Medical History:   Diagnosis Date    Atypical chest pain     2007, 2015    Cardiac catheterization 07/31/2014    LAD diffuse 20-30%. Prior pLAD stent patent. Otherwise < 20% coronary artery disease. LVEDP 10 mmHg. EF 55%. Minimal anteroapical hypk.       Carotid disease, bilateral ([facility] [address] 04/08/2016    BICA<50%    Colon polyps 2015    Dr [facility]    Diabetes mellitus ([facility] [address] 1/15    on basis of hba1c    Dyslipidemia     GERD (gastroesophageal reflux disease) 10/2017    Gilbert's syndrome     Gout 2002    Hypovitaminosis D 2012     Lung nodule 09/2017    on CT; questionable 8mm RUL, neg aneurysm; subsequent CT 3/18 showed no nodule, just atelectasis    Nephrolithiasis     ureteral stone 2004 Dr. Vickie Mathias, lithotripsy Renée Ibrahim 2005    Osteoarthritis     Peripheral neuropathy Dr. Yessica Ford, Dr. Peralta Rule 10/11    Venous insufficiency s/p laser vein ablation Dr. Austen Castro 7/12     LEFT leg     Past Surgical History:   Procedure Laterality Date    ABDOMEN SURGERY PROC UNLISTED  10/2017    MRI abd neg    CARDIAC SURG PROCEDURE UNLIST      NST negative, ef 63% (12/13); stress echo neg, ef 57% [facility] (5/17); NST neg, ef 57% (9/17); NST low risk, ef 69%, TID 1 (11/19)    CARDIAC SURG PROCEDURE UNLIST      echo nl lv, ef 60%, mild MR, mild TR (12/13); nl lv, ef 56%, no wma, mild BRANDIE, pap 25 (11/19)    HX APPENDECTOMY      HX COLONOSCOPY      Dr Stone Memory 1/05 negative; Dr Galindo Howell polyps 7/15    HX CORONARY STENT PLACEMENT      severe 95% LAD disease stented to residual 0%.     HX GI      US abd negative 1/08; MRI abd neg 10/17    HX LITHOTRIPSY  07/05    HX ORTHOPAEDIC  2007    LEFT meniscus tear Dr. Pendleton Cecy [address] [facility]      surgery for undescended testicles    VASCULAR SURGERY PROCEDURE UNLIST  2006    neg community screening for PAD/AAA    VASCULAR SURGERY PROCEDURE UNLIST  2008    BICA<50%      Social History     Socioeconomic History    Marital status:      Spouse name: Not on file    Number of children: 2    Years of education: Not on file    Highest education level: Not on file   Occupational History    Occupation:      Employer: RETIRED   Social Needs    Financial resource strain: Not on file    Food insecurity     Worry: Not on file     Inability: Not on file   [employer] needs     Medical: Not on file     Non-medical: Not on file   Tobacco Use    Smoking status: Never Smoker    Smokeless tobacco: Never Used   Substance and Sexual Activity    Alcohol use: Yes     Comment: rarely    Drug use: No    Sexual activity: Yes     Partners: Female     Comment: Wife   Lifestyle    Physical activity     Days per week: Not on file     Minutes per session: Not on file    Stress: Not on file   Relationships    Social connections     Talks on phone: Not on file     Gets together: Not on file     Attends Orthodox service: Not on file     Active member of club or organization: Not on file     Attends meetings of clubs or organizations: Not on file     Relationship status: Not on file    Intimate partner violence     Fear of current or ex partner: Not on file     Emotionally abused: Not on file     Physically abused: Not on file     Forced sexual activity: Not on file   Other Topics Concern    Not on file   Social History Narrative    Not on file     Current Outpatient Medications   Medication Sig    atorvastatin (LIPITOR) 80 mg tablet Take 1 Tab by mouth daily.  ezetimibe (ZETIA) 10 mg tablet Take 1 Tab by mouth daily.  clopidogreL (Plavix) 75 mg tab Take 1 Tab by mouth daily.  metFORMIN (GLUCOPHAGE) 500 mg tablet Take 1 Tab by mouth two (2) times daily (with meals).  pantoprazole (PROTONIX) 40 mg tablet Take 1 Tab by mouth daily.  lancets misc Use daily as directed    glucose blood VI test strips (PRECISION XTRA TEST) strip Pt to test blood sugar once daily. Dx: E11.9    metoprolol succinate (TOPROL XL) 25 mg XL tablet Take 1 Tab by mouth daily.  nitroglycerin (NITROSTAT) 0.4 mg SL tablet 1 Tab by SubLINGual route every five (5) minutes as needed for Chest Pain. Up to 3 doses.  Cholecalciferol, Vitamin D3, 5,000 unit Tab Take  by mouth daily.  aspirin 81 mg tablet Take 81 mg by mouth daily.  Comp. Stocking,Thigh,Long,X-Sml Misc 1 Package by Does Not Apply route daily. 30-40 mm/hg (Patient taking differently: 1 Package by Does Not Apply route daily. 30-40 mm/hg)    UBIDECARENONE (COENZYME Q10 PO) Take  by mouth daily. No current facility-administered medications for this visit.       REVIEW OF SYSTEMS: sees Dr. Ashlie Sahni 2015 [facility], no podiatry  Ophtho  no vision change or eye pain  Oral  no mouth pain, tongue or tooth problems  Ears  no hearing loss, ear pain, fullness  Throat  no swallowing problems  Cardiac  no CP, PND, orthopnea, edema, palpitations or syncope  Chest  no breast masses  Resp  no wheezing, chronic coughing, dyspnea  GI  no heartburn, nausea, vomiting, change in bowel habits, bleeding, hemorrhoids  Urinary  no dysuria, hematuria, flank pain, urgency, frequency    Visit Vitals  /71   Pulse 67   Temp 97.7 °F (36.5 °C) (Temporal)   Resp 14   Ht 5' 11\" (1.803 m)   Wt 168 lb (76.2 kg) SpO2 98%   BMI 23.43 kg/m²     A&O x3  Affect is appropriate. Mood stable  No apparent distress  Anicteric, no JVD, adenopathy or thyromegaly. No carotid bruits or radiated murmur  Lungs clear to auscultation, no wheezes or rales  Heart showed regular rate and rhythm. No murmur, rubs, gallops  Abdomen soft nontender, no hepatosplenomegaly or masses. Extremities without edema.   Pulses 1-2+ symmetrically  Rectal showed guaiac neg brown stool normal tone  Prostate about 30gm without asymmetry, nodularity, tenderness    LABS  From 1/11 showed    gluc 104, cr 0.90,             alt  38,                                   chol 173, tg 82, hdl 49, ldl-c 108, psa 0.90  From 7/11 showed                                         ck 48, javed 7.7  From 2/12 showed    gluc 108                                                   b12 409, fol 13.7, mma 116, homo 8.9, nl esr, shannan neg, nl tsh, 2 hr gtt 98   From 4/12 showed        hba1c 6.2,                 chol 155, tg 93,   hdl 68, ldl-c 68  From 10/12 showed  gluc 98,   cr 0.80,             alt 29, hba1c 6.4  From 3/13 showed    gluc 89,   cr 0.80, gfr 91,  alt 14,                   ldl-p 867, chol 160, tg 50,   hdl 75, ldl-c 75,   psa 0.80  From 8/13 showed                                chol 167, tg 57,   hdl 79, ldl-c 77,  wbc 3.7, hb 13.1, plt 154, vit d 53  From 3/14 showed    gluc 103, cr 0.80, gfr 91,  alt 47, hba1c 6.2,               chol 164, tg 74,   hdl 74, ldl-c 72,  wbc 8.6, hb 14.0, plt 186  From 7/14 showed    gluc 89,   cr 0.94, gfr>60,     hba1c 6.4,               chol 157, tg 87,   hdl 74, ldl-c 67,  wbc 4.3, hb 13.4, plt 168,         ck/trop neg  From 9/14 showed         hba1c 6.2,               psa 0.76,    ua neg  From 3/15 showed    gluc 105, cr 0.80,      alt 34, hba1c 6.4,    chol 175, tg 76,   hdl 78, ldl-c 82,   wbc 3.9, hb 14.5, plt 176  From 4/15 showed                     wbc 2.4,               plt 95  From 6/15 showed                     wbc 3.8, hb 14.4, plt 123,         fe 90, %sat 30, feritin 334, b12 365, fol 14.7   From 9/15 showed         hba1c 6.3,    chol 165, tg 91,   hdl 63, ldl-c 84  From 1/16 showed    gluc 114, cr 0.80, gfr 90,  alt 63, hba1c 6.5,    chol 166, tg 100, hdl 73, ldl-c 73,  wbc 3.9, hb 14.5, plt 165  From 4/16 showed         hba1c 6.5,    chol 164, tg 61,   hdl 75, ldl-c 77,      umar 4.0  From 1/17 showed    gluc 101, cr 0.79, gfr>60, alt 33, hba1c 6.5,     chol 155, tg 70,   hdl 75, ldl-c 66,      umar 6.0  From 9/17 showed         hba1c 6.2,    chol 150, tg 77,   hdl 63, ldl-c 72,  wbc 3.5, hb 13.3, plt 160  From 4/18 showed    gluc 107, cr 0.83, gfr>60, alt 35, hba1c 6.2,            umar 9.5,  psa 1.06  From 10/18 showed         hba1c 6.6,    chol 172, tg 85,   hdl 79, ldl-c 76,  wbc 3.9, hb 14.2, plt 172,        vit d 42.9  From 4/19 showed    gluc 101, cr 0.73, gfr>60, alt 21, hba1c 6.2,          chol 164, tg 76,   hdl 79, ldl-c 70,  wbc 3.1, hb 13.5, plt 162, umar 7.0,  vit d 37.3  From 10/19 showed  gluc 106, cr 0.78, gfr>60,    hba1c 6.5  From 6/20 showed    gluc 106, cr 0.76, gfr>60, alt 24, hba1c 6.5,    chol 158, tg 70,   hdl 64, ldl-c 80,  wbc 3.5, hb 13.4, plt 178, umar neg, vit d 33.0  From 12/20 showed  gluc 103, cr 0.67, gfr>60, alt 57    We reviewed the patient's labs from the last several visits to point out trends in the numbers        Patient Active Problem List   Diagnosis Code    Hyperlipidemia E78.5    Peripheral neuropathy Dr. Servando Garcia, Dr. Kenan Raymond G62.9    Hypovitaminosis D 2012 E55.9    Arthritis, degenerative M19.90    CAD s/p LAD stent 2007 Dr Jamari Chu I25.10    Advance directive discussed with patient Z71.89    Colon polyp Dr Chele Santoyo 7/15 K63.5    Sinus bradycardia R00.1    Controlled type 2 diabetes mellitus with diabetic neuropathy, without long-term current use of insulin ([facility] [address] E11.40    GERD without esophagitis K21.9     Assessment and plan:  1. CHD. F/U Dr. Jamari Chu, continue current regimen  2. Nephrolithiasis. Hydration  3. Neuropathy. F/U neurology as needed, stable  4. Hypovit D. Check level next draw  5. DM. Continue current regimen and doing well, f/u Dr Pillo Reyna  6. Dyslipidemia. Continue current regimen. 7.  GERD. Avoidance measures and ppi  8. Muscle strain. Tylenol or even otc nsaid short term      RTC 6/21    Above conditions discussed at length and patient vocalized understanding.   All questions answered to patient satisfaction

## 2020-12-25 NOTE — PROGRESS NOTES
This is a Subsequent Medicare Annual Wellness Visit     I have reviewed the patient's medical history in detail and updated the computerized patient record. History     Past Medical History:   Diagnosis Date    Atypical chest pain     2007, 2015    Cardiac catheterization 07/31/2014    LAD diffuse 20-30%. Prior pLAD stent patent. Otherwise < 20% coronary artery disease. LVEDP 10 mmHg. EF 55%. Minimal anteroapical hypk.  Carotid disease, bilateral ([facility] [address] 04/08/2016    BICA<50%    Colon polyps 2015    Dr Socrates Aguero [place]    Diabetes mellitus ([facility] [address] 1/15    on basis of hba1c    Dyslipidemia     GERD (gastroesophageal reflux disease) 10/2017    Gilbert's syndrome     Gout 2002    Hypovitaminosis D 2012     Lung nodule 09/2017    on CT; questionable 8mm RUL, neg aneurysm; subsequent CT 3/18 showed no nodule, just atelectasis    Nephrolithiasis     ureteral stone 2004 Dr. Marques De Los Santos, lithotripsy Paz Roger 2005    Osteoarthritis     Peripheral neuropathy Dr. Lawrence Kenyon, Dr. Mauricio Marina 10/11    Venous insufficiency s/p laser vein ablation Dr. Ellen Colvin 7/12     LEFT leg      Past Surgical History:   Procedure Laterality Date    ABDOMEN SURGERY PROC UNLISTED  10/2017    MRI abd neg    CARDIAC SURG PROCEDURE UNLIST      NST negative, ef 63% (12/13); stress echo neg, ef 57% [facility] (5/17); NST neg, ef 57% (9/17); NST low risk, ef 69%, TID 1 (11/19)    CARDIAC SURG PROCEDURE UNLIST      echo nl lv, ef 60%, mild MR, mild TR (12/13); nl lv, ef 56%, no wma, mild BRANDIE, pap 25 (11/19)    HX APPENDECTOMY      HX COLONOSCOPY      Dr Vanegas Gip 1/05 negative; Dr Nargis Daniel polyps 7/15    HX CORONARY STENT PLACEMENT      severe 95% LAD disease stented to residual 0%.     HX GI      US abd negative 1/08; MRI abd neg 10/17    HX LITHOTRIPSY  07/05    HX ORTHOPAEDIC  2007    LEFT meniscus tear Dr. Kaylie Mcginnis  [address]      surgery for undescended testicles    VASCULAR SURGERY PROCEDURE UNLIST  2006    neg community screening for PAD/AAA    VASCULAR SURGERY PROCEDURE UNLIST  2008    BICA<50%      Current Outpatient Medications   Medication Sig Dispense Refill    atorvastatin (LIPITOR) 80 mg tablet Take 1 Tab by mouth daily. 90 Tab 3    ezetimibe (ZETIA) 10 mg tablet Take 1 Tab by mouth daily. 90 Tab 3    clopidogreL (Plavix) 75 mg tab Take 1 Tab by mouth daily. 90 Tab 3    metFORMIN (GLUCOPHAGE) 500 mg tablet Take 1 Tab by mouth two (2) times daily (with meals). 180 Tab 3    pantoprazole (PROTONIX) 40 mg tablet Take 1 Tab by mouth daily. 90 Tab 3    lancets misc Use daily as directed 1 Each 11    glucose blood VI test strips (PRECISION XTRA TEST) strip Pt to test blood sugar once daily. Dx: E11.9 100 Strip 3    metoprolol succinate (TOPROL XL) 25 mg XL tablet Take 1 Tab by mouth daily. 90 Tab 3    nitroglycerin (NITROSTAT) 0.4 mg SL tablet 1 Tab by SubLINGual route every five (5) minutes as needed for Chest Pain. Up to 3 doses. 1 Bottle 3    Cholecalciferol, Vitamin D3, 5,000 unit Tab Take  by mouth daily.  aspirin 81 mg tablet Take 81 mg by mouth daily.  Comp. Stocking,Thigh,Long,X-Sml Misc 1 Package by Does Not Apply route daily. 30-40 mm/hg (Patient taking differently: 1 Package by Does Not Apply route daily. 30-40 mm/hg) 1 Package 1    UBIDECARENONE (COENZYME Q10 PO) Take  by mouth daily.        No Known Allergies     Family History   Problem Relation Age of Onset    Heart Disease Father     Arthritis-rheumatoid Mother      Social History     Tobacco Use    Smoking status: Never Smoker    Smokeless tobacco: Never Used   Substance Use Topics    Alcohol use: Yes     Comment: rarely     Depression Risk Factor Screening:     3 most recent PHQ Screens 12/29/2020   Little interest or pleasure in doing things Not at all   Feeling down, depressed, irritable, or hopeless Not at all   Total Score PHQ 2 0     SCREENINGS  Colonoscopy last done 2015 Dr Brent San [facility] done 10/19    Immunization History Administered Date(s) Administered    (RETIRED) Pneumococcal Vaccine (Unspecified Type) 01/23/2008    Influenza High Dose Vaccine PF 10/01/2016, 10/01/2017    Influenza Vaccine 10/01/2012, 11/01/2013, 10/01/2014, 10/01/2015    Influenza Vaccine (Tri) Adjuvanted (>65 Yrs FLUAD TRI 87269) 10/17/2018, 10/22/2019, 09/25/2020    Influenza Vaccine Whole 09/01/2010    Pneumococcal Conjugate (PCV-13) 10/01/2015    Pneumococcal Polysaccharide (PPSV-23) 02/06/2017    TD Vaccine 01/01/2005    Zoster 01/01/2008    Zoster Recombinant 11/30/2018, 03/04/2019     Alcohol Risk Factor Screening: On any occasion during the past 3 months, have you had more than 4 drinks containing alcohol? No    Do you average more than 14 drinks per week? No      Functional Ability and Level of Safety:     Hearing Loss   normal-to-mild    Vision - no acute complaints and is followed by Dr. Nicol Begum    Activities of Daily Living   Self-care. Requires assistance with: no ADLs    Fall Risk     Fall Risk Assessment, last 12 mths 12/29/2020   Able to walk? Yes   Fall in past 12 months? 0   Do you feel unsteady? 0   Are you worried about falling 0   Number of falls in past 12 months -   Fall with injury? -     Abuse Screen   Patient is not abused    Review of Systems   A comprehensive review of systems was negative except for that written in the HPI.     Physical Examination     Visit Vitals  /71   Pulse 67   Temp 97.7 °F (36.5 °C) (Temporal)   Resp 14   Ht 5' 11\" (1.803 m)   Wt 168 lb (76.2 kg)   SpO2 98%   BMI 23.43 kg/m²       Evaluation of Cognitive Function:  Mood/affect:  neutral  Appearance: age appropriate, well dressed and within normal Limits  Family member/caregiver input: na    Patient Care Team:  Terra Jain MD as PCP - General (Internal Medicine)  Terra Jain MD as PCP - [facility]  Susan Melendrez RN as [facility] (Internal Medicine)  Ben Goldberg MD (Cardiology)    Advice/Referrals/Counseling   Education and counseling provided:  Are appropriate based on today's review and evaluation  End-of-Life planning (with patient's consent)  Pneumococcal Vaccine  Influenza Vaccine  Prostate cancer screening tests (PSA, covered annually)  Colorectal cancer screening tests  Cardiovascular screening blood test    Assessment/Plan       ICD-10-CM ICD-9-CM    1. Medicare annual wellness visit, subsequent  Z00.00 V70.0    2. Coronary artery disease involving native coronary artery of native heart without angina pectoris  I25.10 414.01    3. Hyperplastic colonic polyp, unspecified part of colon  K63.5 211.3 AMB POC FECAL BLOOD, OCCULT, QL 1 CARD   4. GERD without esophagitis  K21.9 530.81    5. Controlled type 2 diabetes mellitus with diabetic neuropathy, without long-term current use of insulin (HCC)  E11.40 250.60 CBC W/O DIFF     357.2 HEMOGLOBIN A1C W/O EAG      METABOLIC PANEL, COMPREHENSIVE      MICROALBUMIN, UR, RAND W/ MICROALB/CREAT RATIO   6. Peripheral polyneuropathy  G62.9 356.9    7. Hyperlipidemia, unspecified hyperlipidemia type  E78.5 272.4 LIPID PANEL   8. Vitamin D deficiency  E55.9 268.9 VITAMIN D, 25 HYDROXY   9. Advanced directives, counseling/discussion  Z71.89 V65.49    10. Screen for colon cancer  Z12.11 V76.51    11. Special screening for malignant neoplasm of prostate  Z12.5 V76.44 MS PROSTATE CA SCREENING; ROSY     current treatment plan is effective  lab results and schedule of future lab studies reviewed with patient. End of life discussion undertaken. amd on file   Colonoscopy to be scheduled 2025?   Will be beyond age by then

## 2020-12-29 ENCOUNTER — OFFICE VISIT (OUTPATIENT)
Dept: INTERNAL MEDICINE CLINIC | Age: 75
End: 2020-12-29
Payer: MEDICARE

## 2020-12-29 ENCOUNTER — TELEPHONE (OUTPATIENT)
Dept: INTERNAL MEDICINE CLINIC | Age: 75
End: 2020-12-29

## 2020-12-29 VITALS
DIASTOLIC BLOOD PRESSURE: 71 MMHG | RESPIRATION RATE: 14 BRPM | WEIGHT: 168 LBS | SYSTOLIC BLOOD PRESSURE: 109 MMHG | TEMPERATURE: 97.7 F | HEART RATE: 67 BPM | OXYGEN SATURATION: 98 % | BODY MASS INDEX: 23.52 KG/M2 | HEIGHT: 71 IN

## 2020-12-29 DIAGNOSIS — K63.5 HYPERPLASTIC COLONIC POLYP, UNSPECIFIED PART OF COLON: ICD-10-CM

## 2020-12-29 DIAGNOSIS — Z71.89 ADVANCED DIRECTIVES, COUNSELING/DISCUSSION: ICD-10-CM

## 2020-12-29 DIAGNOSIS — K21.9 GERD WITHOUT ESOPHAGITIS: ICD-10-CM

## 2020-12-29 DIAGNOSIS — E11.40 CONTROLLED TYPE 2 DIABETES MELLITUS WITH DIABETIC NEUROPATHY, WITHOUT LONG-TERM CURRENT USE OF INSULIN (HCC): ICD-10-CM

## 2020-12-29 DIAGNOSIS — E55.9 VITAMIN D DEFICIENCY: ICD-10-CM

## 2020-12-29 DIAGNOSIS — Z00.00 MEDICARE ANNUAL WELLNESS VISIT, SUBSEQUENT: Primary | ICD-10-CM

## 2020-12-29 DIAGNOSIS — E78.5 HYPERLIPIDEMIA, UNSPECIFIED HYPERLIPIDEMIA TYPE: ICD-10-CM

## 2020-12-29 DIAGNOSIS — I25.10 CORONARY ARTERY DISEASE INVOLVING NATIVE CORONARY ARTERY OF NATIVE HEART WITHOUT ANGINA PECTORIS: ICD-10-CM

## 2020-12-29 DIAGNOSIS — G62.9 PERIPHERAL POLYNEUROPATHY: ICD-10-CM

## 2020-12-29 DIAGNOSIS — Z12.11 SCREEN FOR COLON CANCER: ICD-10-CM

## 2020-12-29 DIAGNOSIS — Z12.5 SPECIAL SCREENING FOR MALIGNANT NEOPLASM OF PROSTATE: ICD-10-CM

## 2020-12-29 LAB
HEMOCCULT STL QL: NEGATIVE
VALID INTERNAL CONTROL?: YES

## 2020-12-29 PROCEDURE — G8510 SCR DEP NEG, NO PLAN REQD: HCPCS | Performed by: INTERNAL MEDICINE

## 2020-12-29 PROCEDURE — G8536 NO DOC ELDER MAL SCRN: HCPCS | Performed by: INTERNAL MEDICINE

## 2020-12-29 PROCEDURE — G8420 CALC BMI NORM PARAMETERS: HCPCS | Performed by: INTERNAL MEDICINE

## 2020-12-29 PROCEDURE — G0463 HOSPITAL OUTPT CLINIC VISIT: HCPCS | Performed by: INTERNAL MEDICINE

## 2020-12-29 PROCEDURE — G8427 DOCREV CUR MEDS BY ELIG CLIN: HCPCS | Performed by: INTERNAL MEDICINE

## 2020-12-29 PROCEDURE — 99497 ADVNCD CARE PLAN 30 MIN: CPT | Performed by: INTERNAL MEDICINE

## 2020-12-29 PROCEDURE — 99214 OFFICE O/P EST MOD 30 MIN: CPT | Performed by: INTERNAL MEDICINE

## 2020-12-29 PROCEDURE — 1101F PT FALLS ASSESS-DOCD LE1/YR: CPT | Performed by: INTERNAL MEDICINE

## 2020-12-29 PROCEDURE — 82272 OCCULT BLD FECES 1-3 TESTS: CPT | Performed by: INTERNAL MEDICINE

## 2020-12-29 PROCEDURE — G0439 PPPS, SUBSEQ VISIT: HCPCS | Performed by: INTERNAL MEDICINE

## 2020-12-29 NOTE — PATIENT INSTRUCTIONS
Medicare Wellness Visit, Male The best way to live healthy is to have a lifestyle where you eat a well-balanced diet, exercise regularly, limit alcohol use, and quit all forms of tobacco/nicotine, if applicable. Regular preventive services are another way to keep healthy. Preventive services (vaccines, screening tests, monitoring & exams) can help personalize your care plan, which helps you manage your own care. Screening tests can find health problems at the earliest stages, when they are easiest to treat. [facility] follows the current, evidence-based guidelines published by the [facility] ([facility]) when recommending preventive services for our patients. Because we follow these guidelines, sometimes recommendations change over time as research supports it. (For example, a prostate screening blood test is no longer routinely recommended for men with no symptoms). Of course, you and your doctor may decide to screen more often for some diseases, based on your risk and co-morbidities (chronic disease you are already diagnosed with). Preventive services for you include: - Medicare offers their members a free annual wellness visit, which is time for you and your primary care provider to discuss and plan for your preventive service needs. Take advantage of this benefit every year! 
-All adults over age 72 should receive the recommended pneumonia vaccines. Current USPSTF guidelines recommend a series of two vaccines for the best pneumonia protection.  
-All adults should have a flu vaccine yearly and tetanus vaccine every 10 years. 
-All adults age 48 and older should receive the shingles vaccines (series of two vaccines). -All adults age 38-68 who are overweight should have a diabetes screening test once every three years. -Other screening tests & preventive services for persons with diabetes include: an eye exam to screen for diabetic retinopathy, a kidney function test, a foot exam, and stricter control over your cholesterol.  
-Cardiovascular screening for adults with routine risk involves an electrocardiogram (ECG) at intervals determined by the provider.  
-Colorectal cancer screening should be done for adults age 54-65 with no increased risk factors for colorectal cancer. There are a number of acceptable methods of screening for this type of cancer. Each test has its own benefits and drawbacks. Discuss with your provider what is most appropriate for you during your annual wellness visit. The different tests include: colonoscopy (considered the best screening method), a fecal occult blood test, a fecal DNA test, and sigmoidoscopy. 
-All adults born between [facility] should be screened once for Hepatitis C. 
-An Abdominal Aortic Aneurysm (AAA) Screening is recommended for men age 73-68 who has ever smoked in their lifetime. Here is a list of your current Health Maintenance items (your personalized list of preventive services) with a due date: There are no preventive care reminders to display for this patient.

## 2020-12-29 NOTE — ACP (ADVANCE CARE PLANNING)
Advance Care Planning     General Advance Care Planning (ACP) Conversation      Date of Conversation: 12/29/2020  Conducted with: Patient with Decision Making Capacity    Healthcare Decision Maker:     Click here to complete [place] including selection of the [place] Relationship (ie \"Primary\")  Today we documented Decision Maker(s) consistent with Legal Next of Kin hierarchy. Content/Action Overview:    Has ACP document(s) on file - reflects the patient's care preferences  Reviewed DNR/DNI and patient elects Full Code (Attempt Resuscitation)  Topics discussed: ventilation preferences, hospitalization preferences and resuscitation preferences  Additional Comments: none     Length of Voluntary ACP Conversation in minutes:  16 minutes    Sissy Rowe MD

## 2021-01-06 DIAGNOSIS — I25.10 CORONARY ARTERY DISEASE INVOLVING NATIVE CORONARY ARTERY OF NATIVE HEART WITHOUT ANGINA PECTORIS: ICD-10-CM

## 2021-01-06 RX ORDER — METOPROLOL SUCCINATE 25 MG/1
25 TABLET, EXTENDED RELEASE ORAL DAILY
Qty: 90 TAB | Refills: 3 | Status: SHIPPED | OUTPATIENT
Start: 2021-01-06 | End: 2021-03-03 | Stop reason: SDUPTHER

## 2021-01-06 NOTE — TELEPHONE ENCOUNTER
Last visit 12/29/2020 MD Siri Mancini   Next appointment 06/15/2021 MD Siri Mancini   Previous refill encounter(s)   12/10/2019 Toprol XL #90 with 3 refills. Requested Prescriptions     Pending Prescriptions Disp Refills    metoprolol succinate (Toprol XL) 25 mg XL tablet 90 Tab 1     Sig: Take 1 Tab by mouth daily.

## 2021-02-17 DIAGNOSIS — I25.10 CORONARY ARTERY DISEASE INVOLVING NATIVE CORONARY ARTERY OF NATIVE HEART WITHOUT ANGINA PECTORIS: ICD-10-CM

## 2021-02-17 RX ORDER — METOPROLOL SUCCINATE 25 MG/1
25 TABLET, EXTENDED RELEASE ORAL DAILY
Qty: 90 TAB | Refills: 3 | Status: CANCELLED | OUTPATIENT
Start: 2021-02-17

## 2021-03-03 DIAGNOSIS — I25.10 CORONARY ARTERY DISEASE INVOLVING NATIVE CORONARY ARTERY OF NATIVE HEART WITHOUT ANGINA PECTORIS: ICD-10-CM

## 2021-03-03 DIAGNOSIS — E11.40 CONTROLLED TYPE 2 DIABETES MELLITUS WITH DIABETIC NEUROPATHY, WITHOUT LONG-TERM CURRENT USE OF INSULIN (HCC): ICD-10-CM

## 2021-03-03 RX ORDER — METOPROLOL SUCCINATE 25 MG/1
25 TABLET, EXTENDED RELEASE ORAL DAILY
Qty: 30 TAB | Refills: 0 | Status: SHIPPED | OUTPATIENT
Start: 2021-03-03 | End: 2021-04-01 | Stop reason: SDUPTHER

## 2021-03-03 RX ORDER — METFORMIN HYDROCHLORIDE 500 MG/1
500 TABLET ORAL 2 TIMES DAILY WITH MEALS
Qty: 180 TAB | Refills: 0 | Status: SHIPPED | OUTPATIENT
Start: 2021-03-03 | End: 2021-04-01 | Stop reason: SDUPTHER

## 2021-03-03 NOTE — TELEPHONE ENCOUNTER
Needs 30 tabs of Metoprolol    Needs 180 tabs of Metformin    He needs these sent to [facility] at [address] [address] in [place] [address]. He is there temporarily.       Please call him at [phone] so he knows it is at pharmacy

## 2021-03-03 NOTE — TELEPHONE ENCOUNTER
Pended as requested to [facility] in [place]    Last Visit: 12/29/20 with MD Lenita [place]  Next Appointment: 6/15/21 with MD Lenita [place]    Requested Prescriptions     Pending Prescriptions Disp Refills    metoprolol succinate (Toprol XL) 25 mg XL tablet 30 Tab 0     Sig: Take 1 Tab by mouth daily.  metFORMIN (GLUCOPHAGE) 500 mg tablet 180 Tab 0     Sig: Take 1 Tab by mouth two (2) times daily (with meals).

## 2021-04-01 DIAGNOSIS — E11.40 CONTROLLED TYPE 2 DIABETES MELLITUS WITH DIABETIC NEUROPATHY, WITHOUT LONG-TERM CURRENT USE OF INSULIN (HCC): ICD-10-CM

## 2021-04-01 DIAGNOSIS — I25.10 CORONARY ARTERY DISEASE INVOLVING NATIVE CORONARY ARTERY OF NATIVE HEART WITHOUT ANGINA PECTORIS: ICD-10-CM

## 2021-04-02 RX ORDER — METFORMIN HYDROCHLORIDE 500 MG/1
500 TABLET ORAL 2 TIMES DAILY WITH MEALS
Qty: 180 TAB | Refills: 3 | Status: SHIPPED | OUTPATIENT
Start: 2021-04-02 | End: 2022-07-11 | Stop reason: SDUPTHER

## 2021-04-02 RX ORDER — METOPROLOL SUCCINATE 25 MG/1
25 TABLET, EXTENDED RELEASE ORAL DAILY
Qty: 90 TAB | Refills: 3 | Status: SHIPPED | OUTPATIENT
Start: 2021-04-02 | End: 2022-04-13 | Stop reason: SDUPTHER

## 2021-04-05 ENCOUNTER — OFFICE VISIT (OUTPATIENT)
Dept: INTERNAL MEDICINE CLINIC | Age: 76
End: 2021-04-05
Payer: MEDICARE

## 2021-04-05 ENCOUNTER — TELEPHONE (OUTPATIENT)
Dept: INTERNAL MEDICINE CLINIC | Age: 76
End: 2021-04-05

## 2021-04-05 VITALS
HEART RATE: 75 BPM | SYSTOLIC BLOOD PRESSURE: 92 MMHG | BODY MASS INDEX: 23.38 KG/M2 | RESPIRATION RATE: 14 BRPM | HEIGHT: 71 IN | TEMPERATURE: 97.7 F | DIASTOLIC BLOOD PRESSURE: 70 MMHG | WEIGHT: 167 LBS | OXYGEN SATURATION: 97 %

## 2021-04-05 DIAGNOSIS — R21 RASH: Primary | ICD-10-CM

## 2021-04-05 PROCEDURE — G8510 SCR DEP NEG, NO PLAN REQD: HCPCS | Performed by: INTERNAL MEDICINE

## 2021-04-05 PROCEDURE — G0463 HOSPITAL OUTPT CLINIC VISIT: HCPCS | Performed by: INTERNAL MEDICINE

## 2021-04-05 PROCEDURE — G8427 DOCREV CUR MEDS BY ELIG CLIN: HCPCS | Performed by: INTERNAL MEDICINE

## 2021-04-05 PROCEDURE — 1101F PT FALLS ASSESS-DOCD LE1/YR: CPT | Performed by: INTERNAL MEDICINE

## 2021-04-05 PROCEDURE — G8420 CALC BMI NORM PARAMETERS: HCPCS | Performed by: INTERNAL MEDICINE

## 2021-04-05 PROCEDURE — 99213 OFFICE O/P EST LOW 20 MIN: CPT | Performed by: INTERNAL MEDICINE

## 2021-04-05 PROCEDURE — G8536 NO DOC ELDER MAL SCRN: HCPCS | Performed by: INTERNAL MEDICINE

## 2021-04-05 RX ORDER — PREDNISONE 20 MG/1
20 TABLET ORAL
Qty: 5 TAB | Refills: 0 | Status: SHIPPED | OUTPATIENT
Start: 2021-04-05 | End: 2021-06-15 | Stop reason: ALTCHOICE

## 2021-04-05 RX ORDER — HYDROXYZINE PAMOATE 25 MG/1
CAPSULE ORAL
Qty: 30 CAP | Refills: 1 | Status: SHIPPED | OUTPATIENT
Start: 2021-04-05 | End: 2021-06-15

## 2021-04-05 NOTE — PROGRESS NOTES
67 y.o. WHITE male who presents for evaluation. He got the 2nd pfizer covid shot 2/14/21. About a week later, he started having itching and rash mainly in the scalp, neck and upper back. No new exposures otherwise, including meds, detergents, toiletries, etc.  Fairly itchy, he's used aveeno and otc steroid with minimal improvement    Past Medical History:   Diagnosis Date    Atypical chest pain     2007, 2015    Cardiac catheterization 07/31/2014    LAD diffuse 20-30%. Prior pLAD stent patent. Otherwise < 20% coronary artery disease. LVEDP 10 mmHg. EF 55%. Minimal anteroapical hypk.  Carotid disease, bilateral ([facility] [address] 04/08/2016    BICA<50%    Colon polyps 2015    Dr Jared Llanos [facility]    Diabetes mellitus ([facility] [address] 1/15    on basis of hba1c    Dyslipidemia     GERD (gastroesophageal reflux disease) 10/2017    Gilbert's syndrome     Gout 2002    Hypovitaminosis D 2012     Lung nodule 09/2017    on CT; questionable 8mm RUL, neg aneurysm; subsequent CT 3/18 showed no nodule, just atelectasis    Nephrolithiasis     ureteral stone 2004 Dr. Dennis Portillo, lithotripsy August Kimberlyes 2005    Osteoarthritis     Peripheral neuropathy Dr. Camila Kapoor, Dr. Naomi Roman 10/11    Venous insufficiency s/p laser vein ablation Dr. Alejandra Llamas 7/12     LEFT leg     Current Outpatient Medications   Medication Sig    predniSONE (DELTASONE) 20 mg tablet Take 20 mg by mouth daily (with breakfast).  hydrOXYzine pamoate (VISTARIL) 25 mg capsule Take 1/2 to 1 tab every 8 hrs as needed for itching    metoprolol succinate (Toprol XL) 25 mg XL tablet Take 1 Tab by mouth daily.  metFORMIN (GLUCOPHAGE) 500 mg tablet Take 1 Tab by mouth two (2) times daily (with meals).  atorvastatin (LIPITOR) 80 mg tablet Take 1 Tab by mouth daily.  ezetimibe (ZETIA) 10 mg tablet Take 1 Tab by mouth daily.  clopidogreL (Plavix) 75 mg tab Take 1 Tab by mouth daily.  pantoprazole (PROTONIX) 40 mg tablet Take 1 Tab by mouth daily.     lancets misc Use daily as directed    glucose blood VI test strips (PRECISION XTRA TEST) strip Pt to test blood sugar once daily. Dx: E11.9    nitroglycerin (NITROSTAT) 0.4 mg SL tablet 1 Tab by SubLINGual route every five (5) minutes as needed for Chest Pain. Up to 3 doses.  Cholecalciferol, Vitamin D3, 5,000 unit Tab Take  by mouth daily.  aspirin 81 mg tablet Take 81 mg by mouth daily.  Comp. Stocking,Thigh,Long,X-Sml Misc 1 Package by Does Not Apply route daily. 30-40 mm/hg (Patient taking differently: 1 Package by Does Not Apply route daily. 30-40 mm/hg)    UBIDECARENONE (COENZYME Q10 PO) Take  by mouth daily. No current facility-administered medications for this visit. No Known Allergies    Visit Vitals  BP 92/70   Pulse 75   Temp 97.7 °F (36.5 °C) (Temporal)   Resp 14   Ht 5' 11\" (1.803 m)   Wt 167 lb (75.8 kg)   SpO2 97%   BMI 23.29 kg/m²   eczematous breakout in the right neck, right trapezius area, right deltoid and a few other spots. No cellulitis    Assessment and plan:  1. Rash, possibly reaction to covid shot?? Discussed sending to derm for opinion but declined. pred 20 x5 days and vistaril given, call w update  2. DM. He will follow sugar closely      Above conditions discussed at length and patient vocalized understanding.   All questions answered to patient satisfaction

## 2021-04-05 NOTE — TELEPHONE ENCOUNTER
Store calling says they need clarification on Hydroxyzine. Says it is for 25 mg capsules but the directions say 1/2 to 1 tablet. Please call asap since pt is at the store now to . Nurse not available.

## 2021-04-05 NOTE — PROGRESS NOTES
Trina Hawthorne presents today for   Chief Complaint   Patient presents with    Rash     on different areas of body, itches, no recent changes in meds . itching started 1 week after pfizer shot               Depression Screening:  3 most recent PHQ Screens 4/5/2021   Little interest or pleasure in doing things Not at all   Feeling down, depressed, irritable, or hopeless Not at all   Total Score PHQ 2 0       Learning Assessment:  Learning Assessment 4/5/2021   PRIMARY LEARNER Patient   HIGHEST LEVEL OF EDUCATION - PRIMARY LEARNER  > 4 YEARS OF COLLEGE   PRIMARY LANGUAGE ENGLISH   LEARNER PREFERENCE PRIMARY READING     LISTENING     PICTURES     VIDEOS     DEMONSTRATION   ANSWERED BY patient   RELATIONSHIP SELF       Abuse Screening:  Abuse Screening Questionnaire 4/5/2021   Do you ever feel afraid of your partner? N   Are you in a relationship with someone who physically or mentally threatens you? N   Is it safe for you to go home? Y       Fall Risk  Fall Risk Assessment, last 12 mths 4/5/2021   Able to walk? Yes   Fall in past 12 months? 0   Do you feel unsteady? 0   Are you worried about falling 0   Number of falls in past 12 months -   Fall with injury? -           Coordination of Care:  1. Have you been to the ER, urgent care clinic since your last visit? Hospitalized since your last visit? no    2. Have you seen or consulted any other health care providers outside of the [address] [place] [address] since your last visit? Include any pap smears or colon screening.  no

## 2021-05-17 RX ORDER — PANTOPRAZOLE SODIUM 40 MG/1
40 TABLET, DELAYED RELEASE ORAL DAILY
Qty: 90 TAB | Refills: 3 | Status: SHIPPED | OUTPATIENT
Start: 2021-05-17 | End: 2022-05-16 | Stop reason: SDUPTHER

## 2021-05-17 NOTE — TELEPHONE ENCOUNTER
Last Visit: 4/5/21 with MD Chele Yepez  Next Appointment: 6/15/21 with MD Chele Yepez  Previous Refill Encounter(s): 4/28/20 #90 with 3 refills    Requested Prescriptions     Pending Prescriptions Disp Refills    pantoprazole (PROTONIX) 40 mg tablet 90 Tab 3     Sig: Take 1 Tab by mouth daily.

## 2021-06-09 NOTE — PROGRESS NOTES
67 y.o. WHITE male who presents for evaluation. No cardiovascular complaints and he continues to walk 2mi about 5x/week through the pandemic. Saw Dr John Isaac and no new recs. Echo and NST 11/19 as below. systolics tend to run low in the office but he's getting 110-120 with his home machine and completely asymptomatic    No polyuria, polydipsia, nocturia, vision change. FBS <110 mostly, not checking pps, weight is unchanged    Vitals 6/15/2021 4/5/2021 12/29/2020 12/17/2020   Weight 166 lb 167 lb 168 lb 167 lb     No gi or gu issues. He has nocturia once nightly    The neuropathy sx are about the same limited to the feet and no progression over the years    [address]: 3/28/14, 4/10/15, 4/13/16, 10/2/17, 10/17/18, 10/28/19, 12/29/20    Past Medical History:   Diagnosis Date    Atypical chest pain     2007, 2015    Carotid artery disease ([facility] [address] 04/08/2016    BICA<50%    Colon polyps 2015    Dr Sunitha Charles [facility]    Diabetes mellitus ([facility] [address] 1/15    on basis of hba1c    Dyslipidemia     GERD (gastroesophageal reflux disease) 10/2017    Gilbert's syndrome     Gout 2002    H/O cardiac catheterization 07/31/2014    LAD diffuse 20-30%. Prior pLAD stent patent. Otherwise < 20% coronary artery disease. LVEDP 10 mmHg. EF 55%. Minimal anteroapical hypk.  Hypovitaminosis D 2012     Lung nodule 09/2017    on CT; questionable 8mm RUL, neg aneurysm; subsequent CT 3/18 showed no nodule, just atelectasis    Nephrolithiasis     ureteral stone 2004 Dr. Kathy Rico, lithotripsy Ivonne Mcconnell 2005    Osteoarthritis     Peripheral neuropathy Dr. Sharmin Reyes, Dr. Noe Barbosa 10/11    Venous insufficiency     s/p left leg vein ablation Dr John Isaac 7/12     Past Surgical History:   Procedure Laterality Date    HX APPENDECTOMY      HX COLONOSCOPY      Dr Sarita White 1/05 negative; Dr Lady Madrigal polyps 7/15    HX CORONARY Chiqui Rox      severe 95% LAD disease stented to residual 0%.     HX GI      US abd negative 1/08; MRI abd neg 10/17    HX LITHOTRIPSY  07/05    HX ORTHOPAEDIC  2007    LEFT meniscus tear Dr. Tanna Stanford [address]      surgery for undescended testicles    TN ABDOMEN SURGERY PROC UNLISTED  10/2017    MRI abd neg    TN CARDIAC SURG PROCEDURE UNLIST      NST negative, ef 63% (12/13); stress echo neg, ef 57% [facility] (5/17); NST neg, ef 57% (9/17); NST low risk, ef 69%, TID 1 (11/19)    TN CARDIAC SURG PROCEDURE UNLIST      echo nl lv, ef 60%, mild MR, mild TR (12/13); nl lv, ef 56%, no wma, mild BRANDIE, pap 25 (11/19)    VASCULAR SURGERY PROCEDURE UNLIST  2006    neg community screening for PAD/AAA    VASCULAR SURGERY PROCEDURE UNLIST  2008    BICA<50%      Social History     Socioeconomic History    Marital status:      Spouse name: Not on file    Number of children: 2    Years of education: Not on file    Highest education level: Not on file   Occupational History    Occupation:      Employer: RETIRED   Tobacco Use    Smoking status: Never Smoker    Smokeless tobacco: Never Used   Substance and Sexual Activity    Alcohol use: Yes     Comment: rarely    Drug use: No    Sexual activity: Yes     Partners: Female     Comment: Wife   Other Topics Concern    Not on file   Social History Narrative    Not on file     Social Determinants of Health     Financial Resource Strain:     Difficulty of Paying Living Expenses:    Food Insecurity:     Worried About Running Out of Food in the Last Year:     [address] in the Last Year:    Transportation Needs:     Lack of Transportation (Medical):      Lack of Transportation (Non-Medical):    Physical Activity:     Days of Exercise per Week:     Minutes of Exercise per Session:    Stress:     Feeling of Stress :    Social Connections:     Frequency of Communication with Friends and Family:     Frequency of Social Gatherings with Friends and Family:     Attends Mandae[employer] Services:     Active Member of Clubs or Organizations:     Attends Club or Organization Meetings:     Marital Status:    Intimate Partner Violence:     Fear of Current or Ex-Partner:     Emotionally Abused:     Physically Abused:     Sexually Abused:      Current Outpatient Medications   Medication Sig    pantoprazole (PROTONIX) 40 mg tablet Take 1 Tab by mouth daily.  metoprolol succinate (Toprol XL) 25 mg XL tablet Take 1 Tab by mouth daily.  metFORMIN (GLUCOPHAGE) 500 mg tablet Take 1 Tab by mouth two (2) times daily (with meals).  atorvastatin (LIPITOR) 80 mg tablet Take 1 Tab by mouth daily.  ezetimibe (ZETIA) 10 mg tablet Take 1 Tab by mouth daily.  clopidogreL (Plavix) 75 mg tab Take 1 Tab by mouth daily.  lancets misc Use daily as directed    glucose blood VI test strips (PRECISION XTRA TEST) strip Pt to test blood sugar once daily. Dx: E11.9    nitroglycerin (NITROSTAT) 0.4 mg SL tablet 1 Tab by SubLINGual route every five (5) minutes as needed for Chest Pain. Up to 3 doses.  Cholecalciferol, Vitamin D3, 5,000 unit Tab Take  by mouth daily.  aspirin 81 mg tablet Take 81 mg by mouth daily. No current facility-administered medications for this visit. REVIEW OF SYSTEMS: sees Dr. Kevin Simpson 2015 Dr Alize Hoang at [facility], no podiatry  Ophtho  no vision change or eye pain  Oral  no mouth pain, tongue or tooth problems  Ears  no hearing loss, ear pain, fullness  Throat  no swallowing problems  Cardiac  no CP, PND, orthopnea, edema, palpitations or syncope  Chest  no breast masses  Resp  no wheezing, chronic coughing, dyspnea  GI  no heartburn, nausea, vomiting, change in bowel habits, bleeding, hemorrhoids  Urinary  no dysuria, hematuria, flank pain, urgency, frequency    Visit Vitals  /70   Pulse 62   Temp 97.9 °F (36.6 °C) (Temporal)   Resp 16   Ht 5' 11\" (1.803 m)   Wt 166 lb (75.3 kg)   SpO2 97%   BMI 23.15 kg/m²     A&O x3  Affect is appropriate. Mood stable  No apparent distress  Anicteric, no JVD, adenopathy or thyromegaly. No carotid bruits or radiated murmur  Lungs clear to auscultation, no wheezes or rales  Heart showed regular rate and rhythm. No murmur, rubs, gallops  Abdomen soft nontender, no hepatosplenomegaly or masses. Extremities without edema.   Pulses 1-2+ symmetrically    LABS  From 1/11 showed    gluc 104, cr 0.90,             alt  38,                                   chol 173, tg 82, hdl 49, ldl-c 108, psa 0.90  From 7/11 showed                                         ck 48, javed 7.7  From 2/12 showed    gluc 108                                                   b12 409, fol 13.7, mma 116, homo 8.9, nl esr, shannan neg, nl tsh, 2 hr gtt 98   From 4/12 showed        hba1c 6.2,                 chol 155, tg 93,   hdl 68, ldl-c 68  From 10/12 showed  gluc 98,   cr 0.80,             alt 29, hba1c 6.4  From 3/13 showed    gluc 89,   cr 0.80, gfr 91,  alt 14,                   ldl-p 867, chol 160, tg 50,   hdl 75, ldl-c 75,   psa 0.80  From 8/13 showed                                chol 167, tg 57,   hdl 79, ldl-c 77,  wbc 3.7, hb 13.1, plt 154, vit d 53  From 3/14 showed    gluc 103, cr 0.80, gfr 91,  alt 47, hba1c 6.2,               chol 164, tg 74,   hdl 74, ldl-c 72,  wbc 8.6, hb 14.0, plt 186  From 7/14 showed    gluc 89,   cr 0.94, gfr>60,     hba1c 6.4,               chol 157, tg 87,   hdl 74, ldl-c 67,  wbc 4.3, hb 13.4, plt 168,         ck/trop neg  From 9/14 showed         hba1c 6.2,               psa 0.76,    ua neg  From 3/15 showed    gluc 105, cr 0.80,      alt 34, hba1c 6.4,    chol 175, tg 76,   hdl 78, ldl-c 82,   wbc 3.9, hb 14.5, plt 176  From 4/15 showed                     wbc 2.4,               plt 95  From 6/15 showed                     wbc 3.8, hb 14.4, plt 123,         fe 90, %sat 30, feritin 334, b12 365, fol 14.7   From 9/15 showed         hba1c 6.3,    chol 165, tg 91,   hdl 63, ldl-c 84  From 1/16 showed    gluc 114, cr 0.80, gfr 90,  alt 63, hba1c 6.5,    chol 166, tg 100, hdl 73, ldl-c 73,  wbc 3.9, hb 14.5, plt 165  From 4/16 showed         hba1c 6.5,    chol 164, tg 61,   hdl 75, ldl-c 77,      umar 4.0  From 1/17 showed    gluc 101, cr 0.79, gfr>60, alt 33, hba1c 6.5,     chol 155, tg 70,   hdl 75, ldl-c 66,      umar 6.0  From 9/17 showed         hba1c 6.2,    chol 150, tg 77,   hdl 63, ldl-c 72,  wbc 3.5, hb 13.3, plt 160  From 4/18 showed    gluc 107, cr 0.83, gfr>60, alt 35, hba1c 6.2,            umar 9.5,  psa 1.06  From 10/18 showed         hba1c 6.6,    chol 172, tg 85,   hdl 79, ldl-c 76,  wbc 3.9, hb 14.2, plt 172,        vit d 42.9  From 4/19 showed    gluc 101, cr 0.73, gfr>60, alt 21, hba1c 6.2,          chol 164, tg 76,   hdl 79, ldl-c 70,  wbc 3.1, hb 13.5, plt 162, umar 7.0,  vit d 37.3  From 10/19 showed  gluc 106, cr 0.78, gfr>60,    hba1c 6.5  From 6/20 showed    gluc 106, cr 0.76, gfr>60, alt 24, hba1c 6.5,    chol 158, tg 70,   hdl 64, ldl-c 80,  wbc 3.5, hb 13.4, plt 178, umar neg, vit d 33.0  From 12/20 showed  gluc 103, cr 0.67, gfr>60, alt 57    We reviewed the patient's labs from the last several visits to point out trends in the numbers    No labs available for review    Patient Active Problem List   Diagnosis Code    Hyperlipidemia E78.5    Peripheral neuropathy Dr. Susan Yancey, Dr. Estela Gamez G62.9    Hypovitaminosis D 2012 E55.9    Arthritis, degenerative M19.90    CAD s/p LAD stent 2007 Dr Jared Carias I25.10    Advance directive discussed with patient Z71.89    Colon polyp Dr Espinoza Both 7/15 K63.5    Sinus bradycardia R00.1    Controlled type 2 diabetes mellitus with diabetic neuropathy, without long-term current use of insulin ([facility] [address] E11.40    GERD without esophagitis K21.9     Assessment and plan:  1. CHD. F/U Dr. Jared Carias, continue current regimen. We discussed his relatively low bp at length, will continue current as asymptomatic and doing well  2. Nephrolithiasis. Hydration  3. Neuropathy. F/U neurology as needed, stable  4. Hypovit D. Check level next draw  5.   DM. Continue current regimen and doing well, f/u Dr Gadiel Zimmerman  6. Dyslipidemia. Continue current regimen. 7.  GERD. Avoidance measures and ppi        RTC 12/21    Above conditions discussed at length and patient vocalized understanding.   All questions answered to patient satisfaction

## 2021-06-14 NOTE — PROGRESS NOTES
Tequila Lopez presents today for   Chief Complaint   Patient presents with    Follow-up    Cholesterol Problem    Diabetes           Coordination of Care:  1. Have you been to the ER, urgent care clinic since your last visit? Hospitalized since your last visit? no    2. Have you seen or consulted any other health care providers outside of the [address] [place] [address] since your last visit? Include any pap smears or colon screening.  yes

## 2021-06-15 ENCOUNTER — TELEPHONE (OUTPATIENT)
Dept: INTERNAL MEDICINE CLINIC | Age: 76
End: 2021-06-15

## 2021-06-15 ENCOUNTER — OFFICE VISIT (OUTPATIENT)
Dept: INTERNAL MEDICINE CLINIC | Age: 76
End: 2021-06-15
Payer: MEDICARE

## 2021-06-15 VITALS
SYSTOLIC BLOOD PRESSURE: 102 MMHG | OXYGEN SATURATION: 97 % | RESPIRATION RATE: 16 BRPM | WEIGHT: 166 LBS | DIASTOLIC BLOOD PRESSURE: 70 MMHG | TEMPERATURE: 97.9 F | HEART RATE: 62 BPM | HEIGHT: 71 IN | BODY MASS INDEX: 23.24 KG/M2

## 2021-06-15 DIAGNOSIS — G62.9 PERIPHERAL POLYNEUROPATHY: ICD-10-CM

## 2021-06-15 DIAGNOSIS — K21.9 GERD WITHOUT ESOPHAGITIS: ICD-10-CM

## 2021-06-15 DIAGNOSIS — E78.5 HYPERLIPIDEMIA, UNSPECIFIED HYPERLIPIDEMIA TYPE: ICD-10-CM

## 2021-06-15 DIAGNOSIS — I25.10 CORONARY ARTERY DISEASE INVOLVING NATIVE CORONARY ARTERY OF NATIVE HEART WITHOUT ANGINA PECTORIS: ICD-10-CM

## 2021-06-15 DIAGNOSIS — E11.40 CONTROLLED TYPE 2 DIABETES MELLITUS WITH DIABETIC NEUROPATHY, WITHOUT LONG-TERM CURRENT USE OF INSULIN (HCC): Primary | ICD-10-CM

## 2021-06-15 PROCEDURE — G0463 HOSPITAL OUTPT CLINIC VISIT: HCPCS | Performed by: INTERNAL MEDICINE

## 2021-06-15 PROCEDURE — G8427 DOCREV CUR MEDS BY ELIG CLIN: HCPCS | Performed by: INTERNAL MEDICINE

## 2021-06-15 PROCEDURE — G8510 SCR DEP NEG, NO PLAN REQD: HCPCS | Performed by: INTERNAL MEDICINE

## 2021-06-15 PROCEDURE — G8536 NO DOC ELDER MAL SCRN: HCPCS | Performed by: INTERNAL MEDICINE

## 2021-06-15 PROCEDURE — G8420 CALC BMI NORM PARAMETERS: HCPCS | Performed by: INTERNAL MEDICINE

## 2021-06-15 PROCEDURE — 1101F PT FALLS ASSESS-DOCD LE1/YR: CPT | Performed by: INTERNAL MEDICINE

## 2021-06-15 PROCEDURE — 99214 OFFICE O/P EST MOD 30 MIN: CPT | Performed by: INTERNAL MEDICINE

## 2021-06-15 NOTE — TELEPHONE ENCOUNTER
Please order pt's future labs. We will mail them.  He gets them collected off site    He made 6 month f/u appt for 12/21/2021

## 2021-06-28 DIAGNOSIS — E78.5 HYPERLIPIDEMIA, UNSPECIFIED HYPERLIPIDEMIA TYPE: ICD-10-CM

## 2021-06-28 DIAGNOSIS — I65.23 CAROTID STENOSIS, BILATERAL: ICD-10-CM

## 2021-06-28 DIAGNOSIS — I25.10 CORONARY ARTERY DISEASE INVOLVING NATIVE CORONARY ARTERY OF NATIVE HEART WITHOUT ANGINA PECTORIS: ICD-10-CM

## 2021-06-28 NOTE — TELEPHONE ENCOUNTER
Last Visit: 6/15/21 with MD Destinee Camacho  Next Appointment: 12/21/21 with MD Destinee Camacho  Previous Refill Encounter(s): 8/4/20 #90 with 3 refills    Requested Prescriptions     Pending Prescriptions Disp Refills    ezetimibe (ZETIA) 10 mg tablet 90 Tablet 3     Sig: Take 1 Tablet by mouth daily.  clopidogreL (Plavix) 75 mg tab 90 Tablet 3     Sig: Take 1 Tablet by mouth daily.

## 2021-06-29 DIAGNOSIS — E11.40 CONTROLLED TYPE 2 DIABETES MELLITUS WITH DIABETIC NEUROPATHY, WITHOUT LONG-TERM CURRENT USE OF INSULIN (HCC): ICD-10-CM

## 2021-06-29 RX ORDER — BLOOD SUGAR DIAGNOSTIC
STRIP MISCELLANEOUS
Qty: 100 STRIP | Refills: 3 | Status: SHIPPED | OUTPATIENT
Start: 2021-06-29 | End: 2022-05-27 | Stop reason: SDUPTHER

## 2021-06-29 RX ORDER — CLOPIDOGREL BISULFATE 75 MG/1
75 TABLET ORAL DAILY
Qty: 90 TABLET | Refills: 3 | Status: SHIPPED | OUTPATIENT
Start: 2021-06-29 | End: 2022-07-11 | Stop reason: SDUPTHER

## 2021-06-29 RX ORDER — LANCETS
EACH MISCELLANEOUS
Qty: 100 EACH | Refills: 3 | Status: SHIPPED | OUTPATIENT
Start: 2021-06-29 | End: 2022-05-27 | Stop reason: SDUPTHER

## 2021-06-29 RX ORDER — EZETIMIBE 10 MG/1
10 TABLET ORAL DAILY
Qty: 90 TABLET | Refills: 3 | Status: SHIPPED | OUTPATIENT
Start: 2021-06-29 | End: 2022-07-11 | Stop reason: SDUPTHER

## 2021-06-29 NOTE — TELEPHONE ENCOUNTER
Last Visit: 6/15/21 with MD Laxmi Velázquez  Next Appointment: 12/21/21 with MD Laxmi Velázquez  Previous Refill Encounter(s): 4/28/20 lancets #1 with 11 refills, 1/10/20 strips #100 with 3 refills    Requested Prescriptions     Pending Prescriptions Disp Refills    lancets misc 100 Each 3     Sig: Pt to test blood sugar once daily. Dx: E11.9    glucose blood VI test strips (Precision Xtra Test) strip 100 Strip 3     Sig: Pt to test blood sugar once daily.   Dx: E11.9

## 2021-07-01 ENCOUNTER — TELEPHONE (OUTPATIENT)
Dept: INTERNAL MEDICINE CLINIC | Age: 76
End: 2021-07-01

## 2021-07-01 NOTE — TELEPHONE ENCOUNTER
Pt aware of message below and verbalized understanding. Lab slip was mailed out by Erin Brown on 6/15/21- patient has received it. No further questions or concerns from pt at this time.

## 2021-07-01 NOTE — TELEPHONE ENCOUNTER
pls call    Reviewed the labs done at [facility]    We DID order the a1c, they just didn't run it    That said, fbs 108 (in line with previous); the last few years worth of a1cs have been running in the 6.2 to 6.6 range so no reason to expect it would be much different on this draw had it been done    Lipids ok, umar ok, wbc ok, vit d ok    pls print out the orders for next visit and mail to pt - note that a1c ordered there as well

## 2021-09-22 DIAGNOSIS — E11.40 CONTROLLED TYPE 2 DIABETES MELLITUS WITH DIABETIC NEUROPATHY, WITHOUT LONG-TERM CURRENT USE OF INSULIN (HCC): ICD-10-CM

## 2021-09-22 DIAGNOSIS — E78.5 HYPERLIPIDEMIA, UNSPECIFIED HYPERLIPIDEMIA TYPE: ICD-10-CM

## 2021-09-22 DIAGNOSIS — E55.9 VITAMIN D DEFICIENCY: ICD-10-CM

## 2021-10-11 DIAGNOSIS — E78.5 HYPERLIPIDEMIA, UNSPECIFIED HYPERLIPIDEMIA TYPE: ICD-10-CM

## 2021-10-11 NOTE — TELEPHONE ENCOUNTER
Last Visit: 6/15/21 with MD Jackelyn Beasley  Next Appointment: 12/21/21 with MD Rita Orta  Previous Refill Encounter(s): 10/21/20 #90 with 3 refills    Requested Prescriptions     Pending Prescriptions Disp Refills    atorvastatin (LIPITOR) 80 mg tablet 90 Tablet 3     Sig: Take 1 Tablet by mouth daily.

## 2021-10-12 RX ORDER — ATORVASTATIN CALCIUM 80 MG/1
80 TABLET, FILM COATED ORAL DAILY
Qty: 90 TABLET | Refills: 3 | Status: SHIPPED | OUTPATIENT
Start: 2021-10-12 | End: 2022-10-03 | Stop reason: SDUPTHER

## 2021-12-10 LAB
CREATININE, EXTERNAL: 0.8
HBA1C MFR BLD HPLC: 6.5 %
LDL-C, EXTERNAL: 85

## 2021-12-14 ENCOUNTER — OFFICE VISIT (OUTPATIENT)
Dept: CARDIOLOGY CLINIC | Age: 76
End: 2021-12-14
Payer: MEDICARE

## 2021-12-14 VITALS
BODY MASS INDEX: 23.66 KG/M2 | DIASTOLIC BLOOD PRESSURE: 70 MMHG | HEART RATE: 98 BPM | SYSTOLIC BLOOD PRESSURE: 126 MMHG | WEIGHT: 169 LBS | OXYGEN SATURATION: 98 % | HEIGHT: 71 IN

## 2021-12-14 DIAGNOSIS — R06.02 SOB (SHORTNESS OF BREATH): Primary | ICD-10-CM

## 2021-12-14 DIAGNOSIS — I25.10 CORONARY ARTERY DISEASE INVOLVING NATIVE CORONARY ARTERY OF NATIVE HEART WITHOUT ANGINA PECTORIS: ICD-10-CM

## 2021-12-14 PROCEDURE — G8510 SCR DEP NEG, NO PLAN REQD: HCPCS | Performed by: INTERNAL MEDICINE

## 2021-12-14 PROCEDURE — 1101F PT FALLS ASSESS-DOCD LE1/YR: CPT | Performed by: INTERNAL MEDICINE

## 2021-12-14 PROCEDURE — 99214 OFFICE O/P EST MOD 30 MIN: CPT | Performed by: INTERNAL MEDICINE

## 2021-12-14 PROCEDURE — G8427 DOCREV CUR MEDS BY ELIG CLIN: HCPCS | Performed by: INTERNAL MEDICINE

## 2021-12-14 PROCEDURE — G8420 CALC BMI NORM PARAMETERS: HCPCS | Performed by: INTERNAL MEDICINE

## 2021-12-14 PROCEDURE — G8536 NO DOC ELDER MAL SCRN: HCPCS | Performed by: INTERNAL MEDICINE

## 2021-12-14 PROCEDURE — 93000 ELECTROCARDIOGRAM COMPLETE: CPT | Performed by: INTERNAL MEDICINE

## 2021-12-14 NOTE — PROGRESS NOTES
HISTORY OF PRESENT ILLNESS  Angélica Goodman is a 67 y.o. male. ASSESSMENT and PLAN    Mr. Louisa Lovell has history of CAD. He has never had chest pains or angina equivalent. Back in 2493, he had 64 slice CT scan for unclear reasons. This revealed significant calcification. He subsequently underwent evaluation for CAD despite the fact that he had no symptoms. His evaluation revealed severe LAD disease. He subsequently had LAD stent performed in 2007 and has done fairly well since that time. He remains active physically. Because of recurrent episodes of chest pain, he was readmitted to [facility] in July of 2014; he subsequent underwent repeat coronary angiography which revealed patent LAD stent, without significant, occlusive CAD. Reassurance was given. He presented to the hospital in [place] with abdominal discomfort.  He was concerned about possible coronary syndrome.  This was in early part of May 2017. Ramon Brizuela ruled out. Ramon Brizuela had a stress echocardiogram which was reported to him as normal.  In September 2017, he presented to [facility] emergency room with atypical chest pains. Aidan Dorantes ruled out acute coronary event and subsequently discharged the patient home. Ramon Brizuela has had multiple presentations to the emergency room for atypical chest pains over the last 2 years. In November 2019, he had exercise nuclear scan as well as echocardiogram.  Nuclear scan showed EF of 69% without any perfusion abnormalities. His echocardiogram shows normal LV function without wall motion abnormality. · CAD:    Clinically stable. · BP:    Well controlled. · Rhythm:    Stable, asymptomatic sinus bradycardia. · CHF:    There is no evidence of decompensated CHF noted. · Weight:     His weight today is 169 pounds. · Cholesterol:   Target LDL <70. Lipitor 80, Zetia 10. · Anti-platelet:   Remains on ASA, and Plavix.      I will see him back annually. Thank you. Encounter Diagnoses   Name Primary?     SOB (shortness of breath) Yes    Coronary artery disease involving native coronary artery of native heart without angina pectoris      current treatment plan is effective, no change in therapy  lab results and schedule of future lab studies reviewed with patient  reviewed diet, exercise and weight control  cardiovascular risk and specific lipid/LDL goals reviewed  use of aspirin to prevent MI and TIA's discussed      HPI   Today, Mr. Sneha Chanel has no complaints of chest pains. He denies any changes in his exercise capacity. He does have baseline dyspnea on exertion without change. He denies any orthopnea or PND. He denies any palpitations or dizziness. He did have a question about watchman procedure. After the discussion, he asked if this would help. I advised him that this is typically used for atrial fibrillation. All questions were answered. Review of Systems   Respiratory: Positive for shortness of breath. Cardiovascular: Negative for chest pain, palpitations, orthopnea, claudication, leg swelling and PND. All other systems reviewed and are negative. Physical Exam  Vitals and nursing note reviewed. Constitutional:       Appearance: Normal appearance. HENT:      Head: Normocephalic. Eyes:      Conjunctiva/sclera: Conjunctivae normal.   Neck:      Vascular: No carotid bruit. Cardiovascular:      Rate and Rhythm: Regular rhythm. Bradycardia present. Pulmonary:      Breath sounds: Normal breath sounds. Abdominal:      Palpations: Abdomen is soft. Musculoskeletal:         General: No swelling. Cervical back: No rigidity. Skin:     General: Skin is warm and dry. Neurological:      General: No focal deficit present. Mental Status: He is alert and oriented to person, place, and time.    Psychiatric:         Mood and Affect: Mood normal.         Behavior: Behavior normal.         PCP: Herrera Varma MD    Past Medical History:   Diagnosis Date    Atypical chest pain 2007, 2015    Carotid artery disease ([facility] [address] 04/08/2016    BICA<50%    Colon polyps 2015    Dr Angelika Duvall [facility]    Diabetes mellitus ([facility] 1/15    on basis of hba1c    Dyslipidemia     GERD (gastroesophageal reflux disease) 10/2017    Gilbert's syndrome     Gout 2002    H/O cardiac catheterization 07/31/2014    LAD diffuse 20-30%. Prior pLAD stent patent. Otherwise < 20% coronary artery disease. LVEDP 10 mmHg. EF 55%. Minimal anteroapical hypk.  Hypovitaminosis D 2012     Lung nodule 09/2017    on CT; questionable 8mm RUL, neg aneurysm; subsequent CT 3/18 showed no nodule, just atelectasis    Nephrolithiasis     ureteral stone 2004 Dr. Ciro Wilson, lithotripsy Cheryal Pies 2005    Osteoarthritis     Peripheral neuropathy Dr. Britta Merrill, Dr. Eliceo Burt 10/11    Venous insufficiency     s/p left leg vein ablation Dr Grant Whyte 7/12       Past Surgical History:   Procedure Laterality Date    HX APPENDECTOMY      HX COLONOSCOPY      Dr Curt Mojica 1/05 negative; Dr Engle Gravely polyps 7/15    HX CORONARY Lizet Curb      severe 95% LAD disease stented to residual 0%.  HX GI      US abd negative 1/08; MRI abd neg 10/17    HX LITHOTRIPSY  07/05    HX ORTHOPAEDIC  2007    LEFT meniscus tear Dr. Ricky Brannon [address] [facility]      surgery for undescended testicles    [place] ABDOMEN SURGERY PROC UNLISTED  10/2017    MRI abd neg    [place] CARDIAC SURG PROCEDURE UNLIST      NST negative, ef 63% (12/13); stress echo neg, ef 57% [facility] (5/17); NST neg, ef 57% (9/17); NST low risk, ef 69%, TID 1 (11/19)    [place] CARDIAC SURG PROCEDURE UNLIST      echo nl lv, ef 60%, mild MR, mild TR (12/13); nl lv, ef 56%, no wma, mild BRANDIE, pap 25 (11/19)    VASCULAR SURGERY PROCEDURE UNLIST  2006    neg community screening for PAD/AAA    VASCULAR SURGERY PROCEDURE UNLIST  2008    BICA<50%        Current Outpatient Medications   Medication Sig Dispense Refill    atorvastatin (LIPITOR) 80 mg tablet Take 1 Tablet by mouth daily.  90 Tablet 3    ezetimibe (ZETIA) 10 mg tablet Take 1 Tablet by mouth daily. 90 Tablet 3    clopidogreL (Plavix) 75 mg tab Take 1 Tablet by mouth daily. 90 Tablet 3    lancets misc Pt to test blood sugar once daily. Dx: E11.9 100 Each 3    glucose blood VI test strips (Precision Xtra Test) strip Pt to test blood sugar once daily. Dx: E11.9 100 Strip 3    pantoprazole (PROTONIX) 40 mg tablet Take 1 Tab by mouth daily. 90 Tab 3    metoprolol succinate (Toprol XL) 25 mg XL tablet Take 1 Tab by mouth daily. 90 Tab 3    metFORMIN (GLUCOPHAGE) 500 mg tablet Take 1 Tab by mouth two (2) times daily (with meals). 180 Tab 3    nitroglycerin (NITROSTAT) 0.4 mg SL tablet 1 Tab by SubLINGual route every five (5) minutes as needed for Chest Pain. Up to 3 doses. 1 Bottle 3    Cholecalciferol, Vitamin D3, 5,000 unit Tab Take  by mouth daily.  aspirin 81 mg tablet Take 81 mg by mouth daily. The patient has a family history of    Social History     Tobacco Use    Smoking status: Never Smoker    Smokeless tobacco: Never Used   Substance Use Topics    Alcohol use: Yes     Comment: rarely    Drug use: No       Lab Results   Component Value Date/Time    Cholesterol, total 157 07/30/2014 04:14 AM    HDL Cholesterol 74 (H) 07/30/2014 04:14 AM    LDL, calculated 65.6 07/30/2014 04:14 AM    Triglyceride 87 07/30/2014 04:14 AM    CHOL/HDL Ratio 2.1 07/30/2014 04:14 AM        BP Readings from Last 3 Encounters:   12/14/21 126/70   06/15/21 102/70   04/05/21 92/70        Pulse Readings from Last 3 Encounters:   12/14/21 98   06/15/21 62   04/05/21 75       Wt Readings from Last 3 Encounters:   12/14/21 76.7 kg (169 lb)   06/15/21 75.3 kg (166 lb)   04/05/21 75.8 kg (167 lb)         EKG: unchanged from previous tracings, sinus bradycardia, RBBB.

## 2021-12-14 NOTE — PATIENT INSTRUCTIONS
Follow up with Dr. Reyna Salazar in 1 year  Phar nuclear stress test  - to be done prior to next appt.

## 2021-12-16 NOTE — PROGRESS NOTES
67 y.o. WHITE/NON- male who presents for evaluation. No cardiovascular complaints and he continues to walk 2mi about 5x/week. Saw Dr Yo Crockett last week and no new recs. bp in good range when he checks    No polyuria, polydipsia, nocturia, vision change. FBS <110 mostly, not checking pps, weight is unchanged    No gi or gu issues. He has nocturia 1-2x nightly    The neuropathy sx are about the same limited to the feet and no progression over the years    [address]: 3/28/14, 4/10/15, 4/13/16, 10/2/17, 10/17/18, 10/28/19, 12/29/20, 12/21/21    Past Medical History:   Diagnosis Date    Atypical chest pain     2007, 2015    Carotid artery disease ([facility] [address]     BICA<50% (2008); BICA<50% (4/16)    Colon polyps 2015    Dr Parul Obrien [facility]    Diabetes mellitus ([facility] [address] 1/15    on basis of hba1c    Dyslipidemia     GERD (gastroesophageal reflux disease) 10/2017    Gilbert's syndrome     Gout 2002    H/O cardiac catheterization 07/31/2014    LAD diffuse 20-30%. Prior pLAD stent patent. Otherwise < 20% coronary artery disease. LVEDP 10 mmHg. EF 55%. Minimal anteroapical hypk.  Hypovitaminosis D 2012     Lung nodule 09/2017    on CT; questionable 8mm RUL, neg aneurysm; subsequent CT 3/18 showed no nodule, just atelectasis    Nephrolithiasis     ureteral stone 2004 Dr. Jacinto Galeazzi, lithotripsy Nina Saenz 2005    Osteoarthritis     Peripheral neuropathy Dr. Bhaskar Weeks, Dr. Ariel Betancourt 10/11    Venous insufficiency     s/p left leg vein ablation Dr Yo Crockett 7/12     Past Surgical History:   Procedure Laterality Date    HX APPENDECTOMY      HX COLONOSCOPY      Dr Emma Orellana 1/05 negative; Dr Deyvi Sanchez polyps 7/15    HX CORONARY Dangelo Maser      severe 95% LAD disease stented to residual 0%.     HX GI      US abd negative 1/08; MRI abd neg 10/17    HX LITHOTRIPSY  07/05    HX ORTHOPAEDIC  2007    LEFT meniscus tear Dr. Yisroel Rinne [address] [facility]      surgery for undescended testicles    [place] CARDIAC SURG PROCEDURE UNLIST      NST negative, ef 63% (12/13); stress echo neg, ef 57% [facility] (5/17); NST neg, ef 57% (9/17); NST low risk, ef 69%, TID 1 (11/19)    [place] CARDIAC SURG PROCEDURE UNLIST      echo nl lv, ef 60%, mild MR, mild TR (12/13); nl lv, ef 56%, no wma, mild BRANDIE, pap 25 (11/19)    VASCULAR SURGERY PROCEDURE UNLIST  2006    neg community screening for PAD/AAA     Social History     Socioeconomic History    Marital status:      Spouse name: Not on file    Number of children: 2    Years of education: Not on file    Highest education level: Not on file   Occupational History    Occupation:      Employer: RETIRED   Tobacco Use    Smoking status: Never Smoker    Smokeless tobacco: Never Used   Substance and Sexual Activity    Alcohol use: Yes     Comment: rarely    Drug use: No    Sexual activity: Yes     Partners: Female     Comment: Wife   Other Topics Concern    Not on file   Social History Narrative    Not on file     Social Determinants of Health     Financial Resource Strain:     Difficulty of Paying Living Expenses: Not on file   Food Insecurity:     Worried About [address] in the Last Year: Not on file    Misty of Food in the Last Year: Not on file   Transportation Needs:     Lack of Transportation (Medical): Not on file    Lack of Transportation (Non-Medical):  Not on file   Physical Activity:     Days of Exercise per Week: Not on file    Minutes of Exercise per Session: Not on file   Stress:     Feeling of Stress : Not on file   Social Connections:     Frequency of Communication with Friends and Family: Not on file    Frequency of Social Gatherings with Friends and Family: Not on file    Attends Roman [employer] Services: Not on file    Active Member of Clubs or Organizations: Not on file    Attends Club or Organization Meetings: Not on file    Marital Status: Not on file   Intimate Partner Violence:     Fear of Current or Ex-Partner: Not on file   [employer] Abused: Not on file    Physically Abused: Not on file    Sexually Abused: Not on file   Housing Stability:     Unable to Pay for Housing in the Last Year: Not on file    Number of Places Lived in the Last Year: Not on file    Unstable Housing in the Last Year: Not on file     Current Outpatient Medications   Medication Sig    atorvastatin (LIPITOR) 80 mg tablet Take 1 Tablet by mouth daily.  ezetimibe (ZETIA) 10 mg tablet Take 1 Tablet by mouth daily.  clopidogreL (Plavix) 75 mg tab Take 1 Tablet by mouth daily.  lancets misc Pt to test blood sugar once daily. Dx: E11.9    glucose blood VI test strips (Precision Xtra Test) strip Pt to test blood sugar once daily. Dx: E11.9    pantoprazole (PROTONIX) 40 mg tablet Take 1 Tab by mouth daily.  metoprolol succinate (Toprol XL) 25 mg XL tablet Take 1 Tab by mouth daily.  metFORMIN (GLUCOPHAGE) 500 mg tablet Take 1 Tab by mouth two (2) times daily (with meals).  nitroglycerin (NITROSTAT) 0.4 mg SL tablet 1 Tab by SubLINGual route every five (5) minutes as needed for Chest Pain. Up to 3 doses.  Cholecalciferol, Vitamin D3, 5,000 unit Tab Take  by mouth daily.  aspirin 81 mg tablet Take 81 mg by mouth daily. No current facility-administered medications for this visit.      REVIEW OF SYSTEMS: sees Dr. So Host 2015 Dr Cherelle Angelo at [facility], no podiatry  Ophtho - no vision change or eye pain  Oral - no mouth pain, tongue or tooth problems  Ears - no hearing loss, ear pain, fullness  Throat - no swallowing problems  Cardiac - no CP, PND, orthopnea, edema, palpitations or syncope  Chest - no breast masses  Resp - no wheezing, chronic coughing, dyspnea  GI - no heartburn, nausea, vomiting, change in bowel habits, bleeding, hemorrhoids  Urinary - no dysuria, hematuria, flank pain, urgency, frequency    Visit Vitals  /70   Pulse 60   Temp 97.7 °F (36.5 °C) (Temporal)   Resp 12   Ht 5' 11\" (1.803 m)   Wt 169 lb (76.7 kg)   SpO2 97%   BMI 23.57 kg/m²     A&O x3  Affect is appropriate. Mood stable  No apparent distress  Anicteric, no JVD, adenopathy or thyromegaly. No carotid bruits or radiated murmur  Lungs clear to auscultation, no wheezes or rales  Heart showed regular rate and rhythm. No murmur, rubs, gallops  Abdomen soft nontender, no hepatosplenomegaly or masses. Extremities without edema.   Pulses 1-2+ symmetrically  Rectal showed guaiac neg brown stool normal tone  Prostate about 40 gm without asymmetry nodularity or tenderness      LABS  From 1/11 showed    gluc 104, cr 0.90,             alt  38,                                   chol 173, tg 82, hdl 49, ldl-c 108, psa 0.90  From 7/11 showed                                         ck 48, javed 7.7  From 2/12 showed    gluc 108                                                   b12 409, fol 13.7, mma 116, homo 8.9, nl esr, shannan neg, nl tsh, 2 hr gtt 98   From 4/12 showed        hba1c 6.2,                 chol 155, tg 93,   hdl 68, ldl-c 68  From 10/12 showed  gluc 98,   cr 0.80,             alt 29, hba1c 6.4  From 3/13 showed    gluc 89,   cr 0.80, gfr 91,  alt 14,                   ldl-p 867, chol 160, tg 50,   hdl 75, ldl-c 75,   psa 0.80  From 8/13 showed                                chol 167, tg 57,   hdl 79, ldl-c 77,  wbc 3.7, hb 13.1, plt 154, vit d 53  From 3/14 showed    gluc 103, cr 0.80, gfr 91,  alt 47, hba1c 6.2,               chol 164, tg 74,   hdl 74, ldl-c 72,  wbc 8.6, hb 14.0, plt 186  From 7/14 showed    gluc 89,   cr 0.94, gfr>60,     hba1c 6.4,               chol 157, tg 87,   hdl 74, ldl-c 67,  wbc 4.3, hb 13.4, plt 168,         ck/trop neg  From 9/14 showed         hba1c 6.2,               psa 0.76,    ua neg  From 3/15 showed    gluc 105, cr 0.80,      alt 34, hba1c 6.4,    chol 175, tg 76,   hdl 78, ldl-c 82,   wbc 3.9, hb 14.5, plt 176  From 4/15 showed                     wbc 2.4,               plt 95  From 6/15 showed                     wbc 3.8, hb 14.4, plt 123,         fe 90, %sat 30, feritin 334, b12 365, fol 14.7   From 9/15 showed         hba1c 6.3,    chol 165, tg 91,   hdl 63, ldl-c 84  From 1/16 showed    gluc 114, cr 0.80, gfr 90,  alt 63, hba1c 6.5,    chol 166, tg 100, hdl 73, ldl-c 73,  wbc 3.9, hb 14.5, plt 165  From 4/16 showed         hba1c 6.5,    chol 164, tg 61,   hdl 75, ldl-c 77,      umar 4.0  From 1/17 showed    gluc 101, cr 0.79, gfr>60, alt 33, hba1c 6.5,     chol 155, tg 70,   hdl 75, ldl-c 66,      umar 6.0  From 9/17 showed         hba1c 6.2,    chol 150, tg 77,   hdl 63, ldl-c 72,  wbc 3.5, hb 13.3, plt 160  From 4/18 showed    gluc 107, cr 0.83, gfr>60, alt 35, hba1c 6.2,            umar 9.5,  psa 1.06  From 10/18 showed         hba1c 6.6,    chol 172, tg 85,   hdl 79, ldl-c 76,  wbc 3.9, hb 14.2, plt 172,        vit d 42.9  From 4/19 showed    gluc 101, cr 0.73, gfr>60, alt 21, hba1c 6.2,          chol 164, tg 76,   hdl 79, ldl-c 70,  wbc 3.1, hb 13.5, plt 162, umar 7.0,  vit d 37.3  From 10/19 showed  gluc 106, cr 0.78, gfr>60,    hba1c 6.5  From 6/20 showed    gluc 106, cr 0.76, gfr>60, alt 24, hba1c 6.5,    chol 158, tg 70,   hdl 64, ldl-c 80,  wbc 3.5, hb 13.4, plt 178, umar neg, vit d 33.0  From 12/20 showed  gluc 103, cr 0.67, gfr>60, alt 57  From 12/21 showed  gluc 89,   cr 0.80, gfr>60, alt 59, hba1c 6.5,    chol 161, tg 91,   hdl 58, ldl-c 85    We reviewed the patient's labs from the last several visits to point out trends in the numbers      Patient Active Problem List   Diagnosis Code    Hyperlipidemia E78.5    Peripheral neuropathy Dr. Josey Lewis, Dr. Colletta Raya G62.9    Hypovitaminosis D 2012 E55.9    Arthritis, degenerative M19.90    CAD s/p LAD stent 2007 Dr Dennys Tan I25.10    Advance directive discussed with patient Z71.89    Colon polyp Dr Charmayne Furrow 7/15 K63.5    Sinus bradycardia R00.1    Controlled type 2 diabetes mellitus with diabetic neuropathy, without long-term current use of insulin ([facility] [address] E11.40    GERD without esophagitis K21.9     Assessment and plan:  1. CHD. F/U Dr. Ludwig Fothergill, continue current regimen. 2.  Nephrolithiasis. Hydration  3. Neuropathy. F/U neurology as needed, stable  4. Hypovit D. Check level next draw  5. DM. Continue current regimen and doing well, f/u Dr Vanessa Gutierrez  6. Dyslipidemia. Discussed possibly adding repatha, he will check on coverage and call back  7. GERD. Avoidance measures and ppi        RTC 6/22    Above conditions discussed at length and patient vocalized understanding. All questions answered to patient satisfaction          ICD-10-CM ICD-9-CM    1. Medicare annual wellness visit, subsequent  Z00.00 V70.0    2. Coronary artery disease involving native coronary artery of native heart without angina pectoris  I25.10 414.01    3. GERD without esophagitis  K21.9 530.81    4. Hyperplastic colonic polyp, unspecified part of colon  K63.5 211.3 AMB POC FECAL BLOOD, OCCULT, QL 1 CARD   5. Controlled type 2 diabetes mellitus with diabetic neuropathy, without long-term current use of insulin (HCC)  E11.40 250.60 CBC W/O DIFF     338.7 METABOLIC PANEL, COMPREHENSIVE      MICROALBUMIN, UR, RAND W/ MICROALB/CREAT RATIO      LIPID PANEL      HEMOGLOBIN A1C WITH EAG   6. Peripheral polyneuropathy  G62.9 356.9    7. Hyperlipidemia, unspecified hyperlipidemia type  E78.5 272.4    8. Advanced directives, counseling/discussion  Z71.89 V65.49 ADVANCE CARE PLANNING FIRST 30 MINS   9.  Special screening for malignant neoplasm of prostate  Z12.5 V76.44 [place] PROSTATE CA SCREENING; ROSY

## 2021-12-16 NOTE — PROGRESS NOTES
This is a Subsequent Medicare Annual Wellness Visit     I have reviewed the patient's medical history in detail and updated the computerized patient record. Assessment/Plan   Education and counseling provided:  Are appropriate based on today's review and evaluation  Influenza Vaccine  Prostate cancer screening tests (PSA, covered annually)  Colorectal cancer screening tests  Cardiovascular screening blood test    1. Medicare annual wellness visit, subsequent  2. Coronary artery disease involving native coronary artery of native heart without angina pectoris  3. GERD without esophagitis  4. Hyperplastic colonic polyp, unspecified part of colon  -     AMB POC FECAL BLOOD, OCCULT, QL 1 CARD  5. Controlled type 2 diabetes mellitus with diabetic neuropathy, without long-term current use of insulin (HCC)  -     CBC W/O DIFF; Future  -     METABOLIC PANEL, COMPREHENSIVE; Future  -     MICROALBUMIN, UR, RAND W/ MICROALB/CREAT RATIO; Future  -     LIPID PANEL; Future  -     HEMOGLOBIN A1C WITH EAG; Future  6. Peripheral polyneuropathy  7. Hyperlipidemia, unspecified hyperlipidemia type  8. Advanced directives, counseling/discussion  -     ADVANCE CARE PLANNING FIRST 30 MINS  9. Special screening for malignant neoplasm of prostate  -     RI PROSTATE CA SCREENING; ROSY       Depression Risk Factor Screening     3 most recent PHQ Screens 12/21/2021   Little interest or pleasure in doing things Not at all   Feeling down, depressed, irritable, or hopeless Not at all   Total Score PHQ 2 0       Alcohol Risk Screen    Do you average more than 1 drink per night or more than 7 drinks a week: No    In the past three months have you have had more than 4 drinks containing alcohol on one occasion: No        Functional Ability and Level of Safety    Hearing: Hearing is good. Activities of Daily Living: The home contains: no safety equipment.   Patient does total self care      Ambulation: with no difficulty     Fall Risk:  Fall Risk Assessment, last 12 mths 12/21/2021   Able to walk? Yes   Fall in past 12 months? 0   Do you feel unsteady? 0   Are you worried about falling 0   Number of falls in past 12 months -   Fall with injury? -      Abuse Screen:  Patient is not abused       Cognitive Screening    Has your family/caregiver stated any concerns about your memory: no     Cognitive Screening: Normal - Mini Cog Test    Health Maintenance Due   There are no preventive care reminders to display for this patient. Patient Care Team   Patient Care Team:  Philip Alvares MD as PCP - General (Internal Medicine)  Philip Alvares MD [facility]  Ella Roto RN as [facility] (Internal Medicine)  Imani Willis MD (Cardiology)    History     Patient Active Problem List   Diagnosis Code    Hyperlipidemia E78.5    Peripheral neuropathy Dr. Jemima Hwang, Dr. Nel Smith G62.9    Hypovitaminosis D 2012 E55.9    Arthritis, degenerative M19.90    CAD s/p LAD stent 2007 Dr Holland Hemp I25.10    Advance directive discussed with patient Z71.89    Colon polyp Dr Cooper Diss 7/15 K63.5    Sinus bradycardia R00.1    Controlled type 2 diabetes mellitus with diabetic neuropathy, without long-term current use of insulin ([facility] [address] E11.40    GERD without esophagitis K21.9     Past Medical History:   Diagnosis Date    Atypical chest pain     2007, 2015    Carotid artery disease ([facility] [address]     BICA<50% (2008); BICA<50% (4/16)    Colon polyps 2015    Dr Cooper Diss [facility]    Diabetes mellitus ([facility] [address] 1/15    on basis of hba1c    Dyslipidemia     GERD (gastroesophageal reflux disease) 10/2017    Gilbert's syndrome     Gout 2002    H/O cardiac catheterization 07/31/2014    LAD diffuse 20-30%. Prior pLAD stent patent. Otherwise < 20% coronary artery disease. LVEDP 10 mmHg. EF 55%. Minimal anteroapical hypk.       Hypovitaminosis D 2012     Lung nodule 09/2017    on CT; questionable 8mm RUL, neg aneurysm; subsequent CT 3/18 showed no nodule, just atelectasis    Nephrolithiasis     ureteral stone 2004 Dr. Grupo Bella, lithotripsy Anna Lunch 2005    Osteoarthritis     Peripheral neuropathy Dr. Junie Zavala, Dr. Adria Neal 10/11    Venous insufficiency     s/p left leg vein ablation Dr Pamela Casanova 7/12      Past Surgical History:   Procedure Laterality Date    HX APPENDECTOMY      HX COLONOSCOPY      Dr Claudette Agreste 1/05 negative; Dr Elaine Reis polyps 7/15    HX CORONARY Rosezena Norbert      severe 95% LAD disease stented to residual 0%.  HX GI      US abd negative 1/08; MRI abd neg 10/17    HX LITHOTRIPSY  07/05    HX ORTHOPAEDIC  2007    LEFT meniscus tear Dr. Yuriy Vigil [address]      surgery for undescended testicles    CA CARDIAC SURG PROCEDURE UNLIST      NST negative, ef 63% (12/13); stress echo neg, ef 57% [facility] (5/17); NST neg, ef 57% (9/17); NST low risk, ef 69%, TID 1 (11/19)    CA CARDIAC SURG PROCEDURE UNLIST      echo nl lv, ef 60%, mild MR, mild TR (12/13); nl lv, ef 56%, no wma, mild BRANDIE, pap 25 (11/19)    VASCULAR SURGERY PROCEDURE UNLIST  2006    neg community screening for PAD/AAA     Current Outpatient Medications   Medication Sig Dispense Refill    atorvastatin (LIPITOR) 80 mg tablet Take 1 Tablet by mouth daily. 90 Tablet 3    ezetimibe (ZETIA) 10 mg tablet Take 1 Tablet by mouth daily. 90 Tablet 3    clopidogreL (Plavix) 75 mg tab Take 1 Tablet by mouth daily. 90 Tablet 3    lancets misc Pt to test blood sugar once daily. Dx: E11.9 100 Each 3    glucose blood VI test strips (Precision Xtra Test) strip Pt to test blood sugar once daily. Dx: E11.9 100 Strip 3    pantoprazole (PROTONIX) 40 mg tablet Take 1 Tab by mouth daily. 90 Tab 3    metoprolol succinate (Toprol XL) 25 mg XL tablet Take 1 Tab by mouth daily. 90 Tab 3    metFORMIN (GLUCOPHAGE) 500 mg tablet Take 1 Tab by mouth two (2) times daily (with meals).  180 Tab 3    nitroglycerin (NITROSTAT) 0.4 mg SL tablet 1 Tab by SubLINGual route every five (5) minutes as needed for Chest Pain. Up to 3 doses. 1 Bottle 3    Cholecalciferol, Vitamin D3, 5,000 unit Tab Take  by mouth daily.  aspirin 81 mg tablet Take 81 mg by mouth daily.        No Known Allergies    Family History   Problem Relation Age of Onset    Heart Disease Father    [facility] Arthritis-rheumatoid Mother      Social History     Tobacco Use    Smoking status: Never Smoker    Smokeless tobacco: Never Used   Substance Use Topics    Alcohol use: Yes     Comment: rarely         He Bradley MD

## 2021-12-21 ENCOUNTER — OFFICE VISIT (OUTPATIENT)
Dept: INTERNAL MEDICINE CLINIC | Age: 76
End: 2021-12-21
Payer: MEDICARE

## 2021-12-21 ENCOUNTER — TELEPHONE (OUTPATIENT)
Dept: INTERNAL MEDICINE CLINIC | Age: 76
End: 2021-12-21

## 2021-12-21 VITALS
OXYGEN SATURATION: 97 % | RESPIRATION RATE: 12 BRPM | WEIGHT: 169 LBS | SYSTOLIC BLOOD PRESSURE: 115 MMHG | HEART RATE: 60 BPM | TEMPERATURE: 97.7 F | HEIGHT: 71 IN | DIASTOLIC BLOOD PRESSURE: 70 MMHG | BODY MASS INDEX: 23.66 KG/M2

## 2021-12-21 DIAGNOSIS — E78.5 HYPERLIPIDEMIA, UNSPECIFIED HYPERLIPIDEMIA TYPE: ICD-10-CM

## 2021-12-21 DIAGNOSIS — I25.10 CORONARY ARTERY DISEASE INVOLVING NATIVE CORONARY ARTERY OF NATIVE HEART WITHOUT ANGINA PECTORIS: ICD-10-CM

## 2021-12-21 DIAGNOSIS — Z71.89 ADVANCED DIRECTIVES, COUNSELING/DISCUSSION: ICD-10-CM

## 2021-12-21 DIAGNOSIS — Z00.00 MEDICARE ANNUAL WELLNESS VISIT, SUBSEQUENT: Primary | ICD-10-CM

## 2021-12-21 DIAGNOSIS — Z12.5 SPECIAL SCREENING FOR MALIGNANT NEOPLASM OF PROSTATE: ICD-10-CM

## 2021-12-21 DIAGNOSIS — E11.40 CONTROLLED TYPE 2 DIABETES MELLITUS WITH DIABETIC NEUROPATHY, WITHOUT LONG-TERM CURRENT USE OF INSULIN (HCC): ICD-10-CM

## 2021-12-21 DIAGNOSIS — K21.9 GERD WITHOUT ESOPHAGITIS: ICD-10-CM

## 2021-12-21 DIAGNOSIS — K63.5 HYPERPLASTIC COLONIC POLYP, UNSPECIFIED PART OF COLON: ICD-10-CM

## 2021-12-21 DIAGNOSIS — G62.9 PERIPHERAL POLYNEUROPATHY: ICD-10-CM

## 2021-12-21 LAB
HEMOCCULT STL QL: NEGATIVE
VALID INTERNAL CONTROL?: YES

## 2021-12-21 PROCEDURE — G8427 DOCREV CUR MEDS BY ELIG CLIN: HCPCS | Performed by: INTERNAL MEDICINE

## 2021-12-21 PROCEDURE — 99497 ADVNCD CARE PLAN 30 MIN: CPT | Performed by: INTERNAL MEDICINE

## 2021-12-21 PROCEDURE — G0463 HOSPITAL OUTPT CLINIC VISIT: HCPCS | Performed by: INTERNAL MEDICINE

## 2021-12-21 PROCEDURE — G8536 NO DOC ELDER MAL SCRN: HCPCS | Performed by: INTERNAL MEDICINE

## 2021-12-21 PROCEDURE — 99214 OFFICE O/P EST MOD 30 MIN: CPT | Performed by: INTERNAL MEDICINE

## 2021-12-21 PROCEDURE — G8510 SCR DEP NEG, NO PLAN REQD: HCPCS | Performed by: INTERNAL MEDICINE

## 2021-12-21 PROCEDURE — 1101F PT FALLS ASSESS-DOCD LE1/YR: CPT | Performed by: INTERNAL MEDICINE

## 2021-12-21 PROCEDURE — 82272 OCCULT BLD FECES 1-3 TESTS: CPT | Performed by: INTERNAL MEDICINE

## 2021-12-21 PROCEDURE — G0439 PPPS, SUBSEQ VISIT: HCPCS | Performed by: INTERNAL MEDICINE

## 2021-12-21 PROCEDURE — G8420 CALC BMI NORM PARAMETERS: HCPCS | Performed by: INTERNAL MEDICINE

## 2021-12-21 NOTE — PATIENT INSTRUCTIONS
Medicare Wellness Visit, Male    The best way to live healthy is to have a lifestyle where you eat a well-balanced diet, exercise regularly, limit alcohol use, and quit all forms of tobacco/nicotine, if applicable. Regular preventive services are another way to keep healthy. Preventive services (vaccines, screening tests, monitoring & exams) can help personalize your care plan, which helps you manage your own care. Screening tests can find health problems at the earliest stages, when they are easiest to treat. [facility] follows the current, evidence-based guidelines published by the [facility] ([facility]) when recommending preventive services for our patients. Because we follow these guidelines, sometimes recommendations change over time as research supports it. (For example, a prostate screening blood test is no longer routinely recommended for men with no symptoms). Of course, you and your doctor may decide to screen more often for some diseases, based on your risk and co-morbidities (chronic disease you are already diagnosed with). Preventive services for you include:  - Medicare offers their members a free annual wellness visit, which is time for you and your primary care provider to discuss and plan for your preventive service needs. Take advantage of this benefit every year!  -All adults over age 72 should receive the recommended pneumonia vaccines. Current USPSTF guidelines recommend a series of two vaccines for the best pneumonia protection.   -All adults should have a flu vaccine yearly and tetanus vaccine every 10 years.  -All adults age 48 and older should receive the shingles vaccines (series of two vaccines).        -All adults age 38-68 who are overweight should have a diabetes screening test once every three years.   -Other screening tests & preventive services for persons with diabetes include: an eye exam to screen for diabetic retinopathy, a kidney function test, a foot exam, and stricter control over your cholesterol.   -Cardiovascular screening for adults with routine risk involves an electrocardiogram (ECG) at intervals determined by the provider.   -Colorectal cancer screening should be done for adults age 54-65 with no increased risk factors for colorectal cancer. There are a number of acceptable methods of screening for this type of cancer. Each test has its own benefits and drawbacks. Discuss with your provider what is most appropriate for you during your annual wellness visit. The different tests include: colonoscopy (considered the best screening method), a fecal occult blood test, a fecal DNA test, and sigmoidoscopy.  -All adults born between [facility] should be screened once for Hepatitis C.  -An Abdominal Aortic Aneurysm (AAA) Screening is recommended for men age 73-68 who has ever smoked in their lifetime. Here is a list of your current Health Maintenance items (your personalized list of preventive services) with a due date: There are no preventive care reminders to display for this patient.

## 2021-12-21 NOTE — PROGRESS NOTES
Kanwal Kelley presents with   Chief Complaint   Patient presents with    Annual Wellness Visit    Cholesterol Problem    Hypertension    Diabetes    Rash     patient describes redness on hands X 6 months and itching on scalp           1. \"Have you been to the ER, urgent care clinic since your last visit? Hospitalized since your last visit? \" NO    2. \"Have you seen or consulted any other health care providers outside of the [address] [place] [address] since your last visit? \" YES, multiple on chart    3. For patients aged 39-70: Has the patient had a colonoscopy? NA based on age or sex     If the patient is female:    3. For patients aged 41-77: Has the patient had a mammogram within the past 2 years? NA based on age or sex    11. For patients aged 21-65: Has the patient had a pap smear?  NA based on age or sex

## 2022-03-19 PROBLEM — E11.40 CONTROLLED TYPE 2 DIABETES MELLITUS WITH DIABETIC NEUROPATHY, WITHOUT LONG-TERM CURRENT USE OF INSULIN (HCC): Status: ACTIVE | Noted: 2017-10-02

## 2022-03-19 PROBLEM — K21.9 GERD WITHOUT ESOPHAGITIS: Status: ACTIVE | Noted: 2018-04-24

## 2022-04-12 DIAGNOSIS — I25.10 CORONARY ARTERY DISEASE INVOLVING NATIVE CORONARY ARTERY OF NATIVE HEART WITHOUT ANGINA PECTORIS: ICD-10-CM

## 2022-04-12 NOTE — TELEPHONE ENCOUNTER
----- Message from [facility] sent at 4/12/2022  9:28 AM EDT -----  Subject: Refill Request    QUESTIONS  Name of Medication? metoprolol succinate (Toprol XL) 25 mg XL tablet  Patient-reported dosage and instructions? 25mg, 1 po qd  How many days do you have left? 3  Preferred Pharmacy? [facility] [address] [email or web address] phone number (if available)? [phone]  ---------------------------------------------------------------------------  --------------,  Name of Medication? Other - Nitroglycerin   Patient-reported dosage and instructions? 1 under tongue prn  How many days do you have left? 0  Preferred Pharmacy? [facility] [address] [email or web address] phone number (if available)? [phone]  ---------------------------------------------------------------------------  --------------  Nelsy MALDONADO  What is the best way for the office to contact you? OK to leave message on   voicemail  Preferred Call Back Phone Number? [phone]  ---------------------------------------------------------------------------  --------------  SCRIPT ANSWERS  Relationship to Patient?  Self

## 2022-04-13 RX ORDER — METOPROLOL SUCCINATE 25 MG/1
25 TABLET, EXTENDED RELEASE ORAL DAILY
Qty: 90 TABLET | Refills: 3 | Status: SHIPPED | OUTPATIENT
Start: 2022-04-13

## 2022-04-13 RX ORDER — NITROGLYCERIN 0.4 MG/1
0.4 TABLET SUBLINGUAL
Qty: 25 TABLET | Refills: 3 | Status: SHIPPED | OUTPATIENT
Start: 2022-04-13

## 2022-04-13 NOTE — TELEPHONE ENCOUNTER
Last Visit: 21 with MD Brandi Carrera  Next Appointment: 6/10/22 with MD Brandi Carrera  Previous Refill Encounter(s): 21 Toprol #90 with 3 refills, 3/29/19 NTG #1 with 3 refills    Requested Prescriptions     Pending Prescriptions Disp Refills    metoprolol succinate (Toprol XL) 25 mg XL tablet 90 Tablet 3     Sig: Take 1 Tablet by mouth daily.  nitroglycerin (NITROSTAT) 0.4 mg SL tablet 25 Tablet 3     Si Tablet by SubLINGual route every five (5) minutes as needed for Chest Pain. Up to 3 doses.

## 2022-04-22 ENCOUNTER — TELEPHONE (OUTPATIENT)
Dept: CARDIOLOGY CLINIC | Age: 77
End: 2022-04-22

## 2022-04-22 NOTE — TELEPHONE ENCOUNTER
Pt called the other day, said he had been at his PCP the other day, and based on his lipid panel, he had suggested that maybe pt should be started on Repatha. Checked with Dr David Gallardo today, he reviewed his last lipid panel  Verbal order and read back per Harris Sen MD  At this time, do not believe pt needs to be started on Repatha. Called pt and left voicemail this afternoon to let him know that per our phone call the other day, that Dr David Gallardo did not plan on ordering any new medications at this time. May call back with any questions.

## 2022-05-16 RX ORDER — PANTOPRAZOLE SODIUM 40 MG/1
40 TABLET, DELAYED RELEASE ORAL DAILY
Qty: 90 TABLET | Refills: 3 | Status: SHIPPED | OUTPATIENT
Start: 2022-05-16

## 2022-05-16 NOTE — TELEPHONE ENCOUNTER
Last Visit: 12/21/21 with MD Regino Echols  Next Appointment: 6/10/22 with MD Regino Echols  Previous Refill Encounter(s): 5/17/21 #90 with 3 refills    Requested Prescriptions     Pending Prescriptions Disp Refills    pantoprazole (PROTONIX) 40 mg tablet 90 Tablet 3     Sig: Take 1 Tablet by mouth daily.

## 2022-05-27 DIAGNOSIS — E11.40 CONTROLLED TYPE 2 DIABETES MELLITUS WITH DIABETIC NEUROPATHY, WITHOUT LONG-TERM CURRENT USE OF INSULIN (HCC): ICD-10-CM

## 2022-05-27 RX ORDER — LANCETS
EACH MISCELLANEOUS
Qty: 100 EACH | Refills: 3 | Status: SHIPPED | OUTPATIENT
Start: 2022-05-27

## 2022-05-27 RX ORDER — BLOOD SUGAR DIAGNOSTIC
STRIP MISCELLANEOUS
Qty: 100 STRIP | Refills: 3 | Status: SHIPPED | OUTPATIENT
Start: 2022-05-27

## 2022-05-27 NOTE — TELEPHONE ENCOUNTER
Last Visit: 12/21/21 with MD Ramona Zamorano  Next Appointment: 6/10/22 with MD Ramona Zamorano  Previous Refill Encounter(s): 6/29/21 #100 with 3 refills    Requested Prescriptions     Pending Prescriptions Disp Refills    glucose blood VI test strips (Precision Xtra Test) strip 100 Strip 3     Sig: Pt to test blood sugar once daily. Dx: E11.9    lancets misc 100 Each 3     Sig: Pt to test blood sugar once daily.   Dx: E11.9

## 2022-06-02 LAB
CREATININE, EXTERNAL: 0.8
HBA1C MFR BLD HPLC: 6.7 %
LDL-C, EXTERNAL: 80
MICROALBUMIN UR TEST STR-MCNC: <5 MG/DL

## 2022-06-06 NOTE — PROGRESS NOTES
65 y.o. WHITE/NON- male who presents for evaluation. He was dx'ed with covid in May and sx have resolved, no treatment     No cardiovascular complaints and he continues to walk 2mi about 5x/week. He spoke to the [place] about getting repatha but they told him it needs to be prescribed by cardiology. He talked to Dr Patricia Mccauley who told him he doesn't need it. His last cath showed patent stent and minimal other disease on max dose statin + zetia but ldl persistently above 70 and we don't know his untreated ldl    No polyuria, polydipsia, nocturia, vision change. FBS <110 mostly, weight is unchanged    No gi or gu issues. He has nocturia 1-2x nightly    The neuropathy sx are about the same limited to the feet and no progression over the years    *LAST MEDICARE WELLNESS EXAM: 3/28/14, 4/10/15, 4/13/16, 10/2/17, 10/17/18, 10/28/19, 12/29/20, 12/21/21    Past Medical History:   Diagnosis Date    Atypical chest pain     2007, 2015    CAD (coronary artery disease) 2007    95% LAD stented to 0%     Carotid artery disease ([facility] [address]     BICA<50% (2008); BICA<50% (4/16)    Colon polyps 2015    Dr Eliana Brewster [facility]    COVID-19 virus infection 05/2022    DM (diabetes mellitus) ([facility] [address] 01/2015    on basis of hba1c    Dyslipidemia     GERD (gastroesophageal reflux disease) 10/2017    Gilbert's syndrome     Gout 2002    H/O cardiac catheterization 07/31/2014    LAD diffuse 20-30%. Prior pLAD stent patent. Otherwise < 20% coronary artery disease. LVEDP 10 mmHg. EF 55%. Minimal anteroapical hypk.       H/O cardiovascular stress test     NST negative, ef 63% (12/13); stress echo neg, ef 57% [facility] (5/17); NST neg, ef 57% [facility] (9/17); NST low risk, ef 69%, TID 1 (11/19)    H/O echocardiogram     nl lv, ef 60%, mild MR, mild TR (12/13); nl lv, ef 56%, no wma, mild BRANDIE, pap 25 (11/19)    Lung nodule 09/2017    on CT; questionable 8mm RUL, neg aneurysm; subsequent CT 3/18 showed no nodule, just atelectasis    Nephrolithiasis ureteral stone 2004 Dr. Susu Martin, lithotripsy Alessandra Park 2005    Osteoarthritis     Peripheral neuropathy Dr. Seena Hatchet, Dr. Gauthier Shadow 10/11    Venous insufficiency     s/p left leg vein ablation Dr Ashley Cadet 7/12    Vitamin D deficiency 2012     Past Surgical History:   Procedure Laterality Date    Vonna Must COLONOSCOPY      Dr Carmelo Mccallum 1/05 neg; Dr Jayna Zimmerman polyps 7/15    HX GI      US abd negative 1/08; MRI abd neg 10/17    HX LITHOTRIPSY  07/05    HX ORTHOPAEDIC  2007    LEFT meniscus tear Dr. Smith [facility] [address]      surgery for undescended testicles    VASCULAR SURGERY PROCEDURE UNLIST  2006    neg community screening for PAD/AAA     Social History     Socioeconomic History    Marital status:      Spouse name: Not on file    Number of children: 2    Years of education: Not on file    Highest education level: Not on file   Occupational History    Occupation:      Employer: RETIRED   Tobacco Use    Smoking status: Never Smoker    Smokeless tobacco: Never Used   Substance and Sexual Activity    Alcohol use: Yes     Comment: rarely    Drug use: No    Sexual activity: Yes     Partners: Female     Comment: Wife   Other Topics Concern    Not on file   Social History Narrative    Not on file     Social Determinants of Health     Financial Resource Strain:     Difficulty of Paying Living Expenses: Not on file   Food Insecurity:     Worried About [address] in the Last Year: Not on file    [address] [facility] [address] in the Last Year: Not on file   Transportation Needs:     Lack of Transportation (Medical): Not on file    Lack of Transportation (Non-Medical):  Not on file   Physical Activity:     Days of Exercise per Week: Not on file    Minutes of Exercise per Session: Not on file   Stress:     Feeling of Stress : Not on file   Social Connections:     Frequency of Communication with Friends and Family: Not on file    Frequency of Social Gatherings with Friends and Family: Not on file    Attends Scientologist Services: Not on file    Active Member of Clubs or Organizations: Not on file    Attends Club or Organization Meetings: Not on file    Marital Status: Not on file   Intimate Partner Violence:     Fear of Current or Ex-Partner: Not on file    Emotionally Abused: Not on file    Physically Abused: Not on file    Sexually Abused: Not on file   Housing Stability:     Unable to Pay for Housing in the Last Year: Not on file    Number of [place] in the Last Year: Not on file    Unstable Housing in the Last Year: Not on file     Current Outpatient Medications   Medication Sig    glucose blood VI test strips (Precision Xtra Test) strip Pt to test blood sugar once daily. Dx: E11.9    lancets misc Pt to test blood sugar once daily. Dx: E11.9    pantoprazole (PROTONIX) 40 mg tablet Take 1 Tablet by mouth daily.  metoprolol succinate (Toprol XL) 25 mg XL tablet Take 1 Tablet by mouth daily.  nitroglycerin (NITROSTAT) 0.4 mg SL tablet 1 Tablet by SubLINGual route every five (5) minutes as needed for Chest Pain. Up to 3 doses.  atorvastatin (LIPITOR) 80 mg tablet Take 1 Tablet by mouth daily.  ezetimibe (ZETIA) 10 mg tablet Take 1 Tablet by mouth daily.  clopidogreL (Plavix) 75 mg tab Take 1 Tablet by mouth daily.  metFORMIN (GLUCOPHAGE) 500 mg tablet Take 1 Tab by mouth two (2) times daily (with meals).  Cholecalciferol, Vitamin D3, 5,000 unit Tab Take  by mouth daily.  aspirin 81 mg tablet Take 81 mg by mouth daily. No current facility-administered medications for this visit.      REVIEW OF SYSTEMS: sees Dr. Miranda Mcclendon 2015 Dr Gurinder Daniels [facility], no podiatry  Ophtho - no vision change or eye pain  Oral - no mouth pain, tongue or tooth problems  Ears - no hearing loss, ear pain, fullness  Throat - no swallowing problems  Cardiac - no CP, PND, orthopnea, edema, palpitations or syncope  Chest - no breast masses  Resp - no wheezing, chronic coughing, dyspnea  GI - no heartburn, nausea, vomiting, change in bowel habits, bleeding, hemorrhoids  Urinary - no dysuria, hematuria, flank pain, urgency, frequency    Visit Vitals  /62   Pulse 61   Temp 97 °F (36.1 °C) (Temporal)   Resp 16   Ht 5' 11\" (1.803 m)   Wt 168 lb (76.2 kg)   SpO2 98%   BMI 23.43 kg/m²     A&O x3  Affect is appropriate. Mood stable  No apparent distress  Anicteric, no JVD, adenopathy or thyromegaly. No carotid bruits or radiated murmur  Lungs clear to auscultation, no wheezes or rales  Heart showed regular rate and rhythm. No murmur, rubs, gallops  Abdomen soft nontender, no hepatosplenomegaly or masses. Extremities without edema.   Pulses 1-2+ symmetrically    LABS  From 1/11 showed    gluc 104, cr 0.90,             alt  38,                                   chol 173, tg 82, hdl 49, ldl-c 108, psa 0.90  From 7/11 showed                                         ck 48, javed 7.7  From 2/12 showed    gluc 108                                                   b12 409, fol 13.7, mma 116, homo 8.9, nl esr, shannan neg, nl tsh, 2 hr gtt 98   From 4/12 showed        hba1c 6.2,                 chol 155, tg 93,   hdl 68, ldl-c 68  From 10/12 showed  gluc 98,   cr 0.80,             alt 29, hba1c 6.4  From 3/13 showed    gluc 89,   cr 0.80, gfr 91,  alt 14,                   ldl-p 867, chol 160, tg 50,   hdl 75, ldl-c 75,   psa 0.80  From 8/13 showed                                chol 167, tg 57,   hdl 79, ldl-c 77,  wbc 3.7, hb 13.1, plt 154, vit d 53  From 3/14 showed    gluc 103, cr 0.80, gfr 91,  alt 47, hba1c 6.2,               chol 164, tg 74,   hdl 74, ldl-c 72,  wbc 8.6, hb 14.0, plt 186  From 7/14 showed    gluc 89,   cr 0.94, gfr>60,     hba1c 6.4,               chol 157, tg 87,   hdl 74, ldl-c 67,  wbc 4.3, hb 13.4, plt 168,         ck/trop neg  From 9/14 showed         hba1c 6.2,               psa 0.76,    ua neg  From 3/15 showed    gluc 105, cr 0.80,      alt 34, hba1c 6.4, chol 175, tg 76,   hdl 78, ldl-c 82,   wbc 3.9, hb 14.5, plt 176  From 4/15 showed                     wbc 2.4,               plt 95  From 6/15 showed                     wbc 3.8, hb 14.4, plt 123,         fe 90, %sat 30, feritin 334, b12 365, fol 14.7   From 9/15 showed         hba1c 6.3,    chol 165, tg 91,   hdl 63, ldl-c 84  From 1/16 showed    gluc 114, cr 0.80, gfr 90,  alt 63, hba1c 6.5,    chol 166, tg 100, hdl 73, ldl-c 73,  wbc 3.9, hb 14.5, plt 165  From 4/16 showed         hba1c 6.5,    chol 164, tg 61,   hdl 75, ldl-c 77,      umar 4.0  From 1/17 showed    gluc 101, cr 0.79, gfr>60, alt 33, hba1c 6.5,     chol 155, tg 70,   hdl 75, ldl-c 66,      umar 6.0  From 9/17 showed         hba1c 6.2,    chol 150, tg 77,   hdl 63, ldl-c 72,  wbc 3.5, hb 13.3, plt 160  From 4/18 showed    gluc 107, cr 0.83, gfr>60, alt 35, hba1c 6.2,            umar 9.5,  psa 1.06  From 10/18 showed         hba1c 6.6,    chol 172, tg 85,   hdl 79, ldl-c 76,  wbc 3.9, hb 14.2, plt 172,        vit d 42.9  From 4/19 showed    gluc 101, cr 0.73, gfr>60, alt 21, hba1c 6.2,          chol 164, tg 76,   hdl 79, ldl-c 70,  wbc 3.1, hb 13.5, plt 162, umar 7.0,  vit d 37.3  From 10/19 showed  gluc 106, cr 0.78, gfr>60,    hba1c 6.5  From 6/20 showed    gluc 106, cr 0.76, gfr>60, alt 24, hba1c 6.5,    chol 158, tg 70,   hdl 64, ldl-c 80,  wbc 3.5, hb 13.4, plt 178, umar neg, vit d 33.0  From 12/20 showed  gluc 103, cr 0.67, gfr>60, alt 57  From 12/21 showed  gluc 89,   cr 0.80, gfr>60, alt 59, hba1c 6.5,    chol 161, tg 91,   hdl 58, ldl-c 85  From 6/22 showed    gluc 106, cr 0.80, gfr>60, alt 18, hba1c 6.7,    chol 169, tg 168, hdl 55, ldl-c 80,  wbc 4.3, hb 12.9, plt 245, umar neg    We reviewed the patient's labs from the last several visits to point out trends in the numbers          Patient Active Problem List   Diagnosis Code    Hyperlipidemia E78.5    Peripheral neuropathy Dr. Gerald Hein, Dr. Marcio Otoole G62.9    Hypovitaminosis D 2012 E55.9  Arthritis, degenerative M19.90    CAD s/p LAD stent 2007 Dr Mary King I25.10    Advance directive discussed with patient Z71.89    Colon polyp Dr Dominick Royal 7/15 K63.5    Sinus bradycardia R00.1    Controlled type 2 diabetes mellitus with diabetic neuropathy, without long-term current use of insulin (HCC) E11.40    GERD without esophagitis K21.9     Assessment and plan:  1. CHD. F/U Dr. Mary King, continue current regimen. 2.  Nephrolithiasis. Hydration  3. Neuropathy. F/U neurology as needed, stable  4. Hypovit D. Check level next draw  5. DM. Continue current regimen and doing well, f/u Dr Dar Cintron  6. Dyslipidemia. Continue current, repatha would be a theoretical consideration at this point  7. GERD. Avoidance measures and ppi        RTC 12/22    Above conditions discussed at length and patient vocalized understanding. All questions answered to patient satisfaction          ICD-10-CM ICD-9-CM    1. Coronary artery disease involving native coronary artery of native heart without angina pectoris  I25.10 414.01    2. GERD without esophagitis  K21.9 530.81    3. Controlled type 2 diabetes mellitus with diabetic neuropathy, without long-term current use of insulin (HCC)  E11.40 250.60 LIPID PANEL     357.2 CBC W/O DIFF      HEMOGLOBIN A1C WITH EAG   4. Peripheral polyneuropathy  G62.9 356.9    5.  Hyperlipidemia, unspecified hyperlipidemia type  E78.5 272.4

## 2022-06-10 ENCOUNTER — OFFICE VISIT (OUTPATIENT)
Dept: INTERNAL MEDICINE CLINIC | Age: 77
End: 2022-06-10
Payer: MEDICARE

## 2022-06-10 ENCOUNTER — TELEPHONE (OUTPATIENT)
Dept: INTERNAL MEDICINE CLINIC | Age: 77
End: 2022-06-10

## 2022-06-10 VITALS
HEART RATE: 61 BPM | SYSTOLIC BLOOD PRESSURE: 105 MMHG | BODY MASS INDEX: 23.52 KG/M2 | HEIGHT: 71 IN | WEIGHT: 168 LBS | RESPIRATION RATE: 16 BRPM | OXYGEN SATURATION: 98 % | DIASTOLIC BLOOD PRESSURE: 62 MMHG | TEMPERATURE: 97 F

## 2022-06-10 DIAGNOSIS — I25.10 CORONARY ARTERY DISEASE INVOLVING NATIVE CORONARY ARTERY OF NATIVE HEART WITHOUT ANGINA PECTORIS: Primary | ICD-10-CM

## 2022-06-10 DIAGNOSIS — E11.40 CONTROLLED TYPE 2 DIABETES MELLITUS WITH DIABETIC NEUROPATHY, WITHOUT LONG-TERM CURRENT USE OF INSULIN (HCC): ICD-10-CM

## 2022-06-10 DIAGNOSIS — E78.5 HYPERLIPIDEMIA, UNSPECIFIED HYPERLIPIDEMIA TYPE: ICD-10-CM

## 2022-06-10 DIAGNOSIS — K21.9 GERD WITHOUT ESOPHAGITIS: ICD-10-CM

## 2022-06-10 DIAGNOSIS — G62.9 PERIPHERAL POLYNEUROPATHY: ICD-10-CM

## 2022-06-10 PROCEDURE — 1101F PT FALLS ASSESS-DOCD LE1/YR: CPT | Performed by: INTERNAL MEDICINE

## 2022-06-10 PROCEDURE — G8427 DOCREV CUR MEDS BY ELIG CLIN: HCPCS | Performed by: INTERNAL MEDICINE

## 2022-06-10 PROCEDURE — 1123F ACP DISCUSS/DSCN MKR DOCD: CPT | Performed by: INTERNAL MEDICINE

## 2022-06-10 PROCEDURE — G8420 CALC BMI NORM PARAMETERS: HCPCS | Performed by: INTERNAL MEDICINE

## 2022-06-10 PROCEDURE — G0463 HOSPITAL OUTPT CLINIC VISIT: HCPCS | Performed by: INTERNAL MEDICINE

## 2022-06-10 PROCEDURE — 99214 OFFICE O/P EST MOD 30 MIN: CPT | Performed by: INTERNAL MEDICINE

## 2022-06-10 PROCEDURE — G8432 DEP SCR NOT DOC, RNG: HCPCS | Performed by: INTERNAL MEDICINE

## 2022-06-10 PROCEDURE — G8536 NO DOC ELDER MAL SCRN: HCPCS | Performed by: INTERNAL MEDICINE

## 2022-06-10 NOTE — PROGRESS NOTES
Caron Montoya presents with   Chief Complaint   Patient presents with    Follow-up     6 month    Cholesterol Problem    Hypertension    Diabetes    [place]                1. \"Have you been to the ER, urgent care clinic since your last visit? Hospitalized since your last visit? \" Carlee Wright 1-21-22 for carpal tunnel    2. \"Have you seen or consulted any other health care providers outside of the [address] [place] [address] since your last visit? \" Carlee Wright [facility] for ED     3. For patients aged 39-70: Has the patient had a colonoscopy / FIT/ Cologuard?  NA - based on age

## 2022-07-11 DIAGNOSIS — E11.40 CONTROLLED TYPE 2 DIABETES MELLITUS WITH DIABETIC NEUROPATHY, WITHOUT LONG-TERM CURRENT USE OF INSULIN (HCC): ICD-10-CM

## 2022-07-11 DIAGNOSIS — I65.23 CAROTID STENOSIS, BILATERAL: ICD-10-CM

## 2022-07-11 DIAGNOSIS — I25.10 CORONARY ARTERY DISEASE INVOLVING NATIVE CORONARY ARTERY OF NATIVE HEART WITHOUT ANGINA PECTORIS: ICD-10-CM

## 2022-07-11 DIAGNOSIS — E78.5 HYPERLIPIDEMIA, UNSPECIFIED HYPERLIPIDEMIA TYPE: ICD-10-CM

## 2022-07-11 RX ORDER — METFORMIN HYDROCHLORIDE 500 MG/1
500 TABLET ORAL 2 TIMES DAILY WITH MEALS
Qty: 180 TABLET | Refills: 3 | Status: SHIPPED | OUTPATIENT
Start: 2022-07-11

## 2022-07-11 RX ORDER — EZETIMIBE 10 MG/1
10 TABLET ORAL DAILY
Qty: 90 TABLET | Refills: 3 | Status: SHIPPED | OUTPATIENT
Start: 2022-07-11

## 2022-07-11 RX ORDER — CLOPIDOGREL BISULFATE 75 MG/1
75 TABLET ORAL DAILY
Qty: 90 TABLET | Refills: 3 | Status: SHIPPED | OUTPATIENT
Start: 2022-07-11

## 2022-07-11 NOTE — TELEPHONE ENCOUNTER
----- Message from Chante Vazquez sent at 7/11/2022  8:44 AM EDT -----  Subject: Refill Request    QUESTIONS  Name of Medication? clopidogreL (Plavix) 75 mg tab  Patient-reported dosage and instructions? 1 daily  How many days do you have left? 12  Preferred Pharmacy? [facility] [address] phone number (if available)? [phone]  ---------------------------------------------------------------------------  --------------,  Name of Medication? ezetimibe (ZETIA) 10 mg tablet  Patient-reported dosage and instructions? 1 daily  How many days do you have left? 12  Preferred Pharmacy? [facility] [address] [employer] [employer] phone number (if available)? [phone]  ---------------------------------------------------------------------------  --------------,  Name of Medication? metFORMIN (GLUCOPHAGE) 500 mg tablet  Patient-reported dosage and instructions? 1 bid  How many days do you have left? 12  Preferred Pharmacy? [facility] [address] [employer] [employer] phone number (if available)? [phone]  ---------------------------------------------------------------------------  --------------  Angelia Mccraydy JOEL  What is the best way for the office to contact you? OK to leave message on   voicemail  Preferred Call Back Phone Number? [phone]  ---------------------------------------------------------------------------  --------------  SCRIPT ANSWERS  Relationship to Patient?  Self

## 2022-07-11 NOTE — TELEPHONE ENCOUNTER
Last Visit: 6/10/22 with MD Racheal Williamson  Next Appointment: 12/15/22 with MD Racheal Williamson  Previous Refill Encounter(s): 6/29/21 Plavix & Zetia 90 d/s with 3 refills, 4/2/21 Glucophage 90 d/s with 3 refills    Requested Prescriptions     Pending Prescriptions Disp Refills    clopidogreL (Plavix) 75 mg tab 90 Tablet 3     Sig: Take 1 Tablet by mouth daily.  ezetimibe (ZETIA) 10 mg tablet 90 Tablet 3     Sig: Take 1 Tablet by mouth daily.  metFORMIN (GLUCOPHAGE) 500 mg tablet 180 Tablet 3     Sig: Take 1 Tablet by mouth two (2) times daily (with meals).          For [facility] in place:    Recommendation Provided To:    Intervention Detail: New Rx: 3, reason: Patient Preference   Gap Closed?:    Intervention Accepted By:   Paulino Puentes Time Spent (min): 5

## 2022-07-29 DIAGNOSIS — E11.40 CONTROLLED TYPE 2 DIABETES MELLITUS WITH DIABETIC NEUROPATHY, WITHOUT LONG-TERM CURRENT USE OF INSULIN (HCC): ICD-10-CM

## 2022-10-03 DIAGNOSIS — E78.5 HYPERLIPIDEMIA, UNSPECIFIED HYPERLIPIDEMIA TYPE: ICD-10-CM

## 2022-10-03 RX ORDER — ATORVASTATIN CALCIUM 80 MG/1
80 TABLET, FILM COATED ORAL DAILY
Qty: 90 TABLET | Refills: 3 | Status: SHIPPED | OUTPATIENT
Start: 2022-10-03

## 2022-10-03 NOTE — TELEPHONE ENCOUNTER
----- Message from Daniel Wang sent at 10/3/2022 11:57 AM EDT -----  Subject: Refill Request    QUESTIONS  Name of Medication? atorvastatin (LIPITOR) 80 mg tablet  Patient-reported dosage and instructions? one a day  How many days do you have left? 12  Preferred Pharmacy? [facility] [address] phone number (if available)? [phone]  ---------------------------------------------------------------------------  --------------  Vivien MALDONADO  What is the best way for the office to contact you? OK to leave message on   voicemail  Preferred Call Back Phone Number? [phone]  ---------------------------------------------------------------------------  --------------  SCRIPT ANSWERS  Relationship to Patient?  Self

## 2022-10-03 NOTE — TELEPHONE ENCOUNTER
Last Visit: 6/10/22 with MD Venancio Khan  Next Appointment: 12/15/22 with MD Venancio Khan  Previous Refill Encounter(s): 10/12/21 #90 with 3 refills    Requested Prescriptions     Pending Prescriptions Disp Refills    atorvastatin (LIPITOR) 80 mg tablet 90 Tablet 3     Sig: Take 1 Tablet by mouth daily. For [address] [facility] in place:   Recommendation Provided To:    Intervention Detail: New Rx: 1, reason: Patient Preference  Gap Closed?:   Intervention Accepted By:   Time Spent (min): 5

## 2022-11-28 LAB
HBA1C MFR BLD HPLC: 6.4 %
LDL-C, EXTERNAL: 93

## 2022-12-02 NOTE — PROGRESS NOTES
65 y.o. WHITE/NON- male who presents for evaluation. No cardiovascular complaints and he continues to walk 2mi about 5x/week. He spoke to the [place] about getting repatha but they told him it needs to be prescribed by cardiology. He talked to Dr Joseph Morrell who told him he doesn't need it. We reviewed most recent recs for ldl mgmt which advocates for ldl-c<55. No polyuria, polydipsia, nocturia, vision change. FBS <110 mostly, weight is unchanged    No gi or gu issues. He has nocturia 1-2x nightly    The neuropathy sx are about the same limited to the feet and no progression over the years    *LAST MEDICARE WELLNESS EXAM: 3/28/14, 4/10/15, 4/13/16, 10/2/17, 10/17/18, 10/28/19, 12/29/20, 12/21/21, 12/5/22    Past Medical History:   Diagnosis Date    Atypical chest pain     2007, 2015    CAD (coronary artery disease) 2007    95% LAD stented to 0%     Carotid artery disease ([facility] [address]     BICA<50% (2008); BICA<50% (4/16)    Colon polyps 2015    Dr Lita Brooks [facility]    COVID-19 virus infection 05/2022    DM (diabetes mellitus) ([facility] [address] 01/2015    on basis of hba1c    Dyslipidemia     GERD (gastroesophageal reflux disease) 10/2017    Gilbert's syndrome     Gout 2002    H/O cardiac catheterization 07/31/2014    LAD diffuse 20-30%. Prior pLAD stent patent. Otherwise < 20% coronary artery disease. LVEDP 10 mmHg. EF 55%. Minimal anteroapical hypk.       H/O cardiovascular stress test     NST negative, ef 63% (12/13); stress echo neg, ef 57% [facility] (5/17); NST neg, ef 57% [facility] (9/17); NST low risk, ef 69%, TID 1 (11/19)    H/O echocardiogram     nl lv, ef 60%, mild MR, mild TR (12/13); nl lv, ef 56%, no wma, mild BRANDIE, pap 25 (11/19)    Lung nodule 09/2017    on CT; questionable 8mm RUL, neg aneurysm; subsequent CT 3/18 showed no nodule, just atelectasis    Nephrolithiasis     ureteral stone 2004 Dr. Atul Bravo, lithotripsy [place] [facility] 2005    Osteoarthritis     Peripheral neuropathy Dr. Clarence Mccallum, Dr. Ariana Borrego 10/11    Venous insufficiency     s/p left leg vein ablation Dr Paloma Bragg 7/12    Vitamin D deficiency 2012     Past Surgical History:   Procedure Laterality Date    HX APPENDECTOMY      HX COLONOSCOPY      Dr Isabel Gonsalez 1/05 neg; Dr Hawa Almonte polyps 7/15    HX GI      US abd negative 1/08; MRI abd neg 10/17    HX LITHOTRIPSY  07/05    HX ORTHOPAEDIC  2007    LEFT meniscus tear Dr. Ricky Camacho [address]      surgery for undescended testicles    VASCULAR SURGERY PROCEDURE UNLIST  2006    neg community screening for PAD/AAA     Social History     Socioeconomic History    Marital status:      Spouse name: Not on file    Number of children: 2    Years of education: Not on file    Highest education level: Not on file   Occupational History    Occupation:      Employer: RETIRED   Tobacco Use    Smoking status: Never    Smokeless tobacco: Never   Substance and Sexual Activity    Alcohol use: Yes     Comment: rarely    Drug use: No    Sexual activity: Yes     Partners: Female     Comment: Wife   Other Topics Concern    Not on file   Social History Narrative    Not on file     Social Determinants of Health     Financial Resource Strain: Not on file   Food Insecurity: Not on file   Transportation Needs: Not on file   Physical Activity: Not on file   Stress: Not on file   Social Connections: Not on file   Intimate Partner Violence: Not on file   Housing Stability: Not on file     Current Outpatient Medications   Medication Sig    evolocumab (REPATHA) syringe 1 mL by SubCUTAneous route every fourteen (14) days. atorvastatin (LIPITOR) 80 mg tablet Take 1 Tablet by mouth daily. clopidogreL (Plavix) 75 mg tab Take 1 Tablet by mouth daily. ezetimibe (ZETIA) 10 mg tablet Take 1 Tablet by mouth daily. metFORMIN (GLUCOPHAGE) 500 mg tablet Take 1 Tablet by mouth two (2) times daily (with meals). glucose blood VI test strips (Precision Xtra Test) strip Pt to test blood sugar once daily.   Dx: E11.9    lancets misc Pt to test blood sugar once daily. Dx: E11.9    pantoprazole (PROTONIX) 40 mg tablet Take 1 Tablet by mouth daily. metoprolol succinate (Toprol XL) 25 mg XL tablet Take 1 Tablet by mouth daily. nitroglycerin (NITROSTAT) 0.4 mg SL tablet 1 Tablet by SubLINGual route every five (5) minutes as needed for Chest Pain. Up to 3 doses. Cholecalciferol, Vitamin D3, 5,000 unit Tab Take  by mouth daily. aspirin 81 mg tablet Take 81 mg by mouth daily. No current facility-administered medications for this visit. REVIEW OF SYSTEMS: sees Dr. Linda Mcneil 2015 Dr Rafiq Santiago [facility], no podiatry  Ophtho - no vision change or eye pain  Oral - no mouth pain, tongue or tooth problems  Ears - no hearing loss, ear pain, fullness  Throat - no swallowing problems  Cardiac - no CP, PND, orthopnea, edema, palpitations or syncope  Chest - no breast masses  Resp - no wheezing, chronic coughing, dyspnea  GI - no heartburn, nausea, vomiting, change in bowel habits, bleeding, hemorrhoids  Urinary - no dysuria, hematuria, flank pain, urgency, frequency    Visit Vitals  /64   Pulse 66   Temp 98.3 °F (36.8 °C) (Temporal)   Resp 18   Ht 5' 11\" (1.803 m)   Wt 165 lb (74.8 kg)   SpO2 98%   BMI 23.01 kg/m²       A&O x3  Affect is appropriate. Mood stable  No apparent distress  Anicteric, no JVD, adenopathy or thyromegaly. No carotid bruits or radiated murmur  Lungs clear to auscultation, no wheezes or rales  Heart showed regular rate and rhythm. No murmur, rubs, gallops  Abdomen soft nontender, no hepatosplenomegaly or masses. Extremities without edema.   Pulses 1-2+ symmetrically    LABS  From 1/11 showed    gluc 104, cr 0.90,             alt  38,                                    chol 173, tg 82, hdl 49, ldl-c 108, psa 0.90  From 7/11 showed                                          ck 48, javed 7.7  From 2/12 showed    gluc 108                                                     b12 409, fol 13.7, mma 116, homo 8.9, nl esr, shannan neg, nl tsh, 2 hr gtt 98   From 4/12 showed          hba1c 6.2,                  chol 155, tg 93,   hdl 68, ldl-c 68  From 10/12 showed  gluc 98,   cr 0.80,             alt 29, hba1c 6.4  From 3/13 showed    gluc 89,   cr 0.80, gfr 91,  alt 14,                   ldl-p 867,  chol 160, tg 50,   hdl 75, ldl-c 75,           psa 0.80  From 8/13 showed                                  chol 167, tg 57,   hdl 79, ldl-c 77,  wbc 3.7, hb 13.1, plt 154, vit d 53  From 3/14 showed    gluc 103, cr 0.80, gfr 91,  alt 47, hba1c 6.2,                 chol 164, tg 74,   hdl 74, ldl-c 72,  wbc 8.6, hb 14.0, plt 186  From 7/14 showed    gluc 89,   cr 0.94, gfr>60,     hba1c 6.4,                 chol 157, tg 87,   hdl 74, ldl-c 67,  wbc 4.3, hb 13.4, plt 168,           ck/trop-  From 9/14 showed         hba1c 6.2,                       psa 0.76,      ua neg  From 3/15 showed    gluc 105, cr 0.80,      alt 34, hba1c 6.4,      chol 175, tg 76,   hdl 78, ldl-c 82,  wbc 3.9, hb 14.5, plt 176  From 4/15 showed                       wbc 2.4,                  From 6/15 showed                       wbc 3.8, hb 14.4, plt 123,           fe 90, %sat 30, feritin 334, b12 365, fol 14.7   From 9/15 showed          hba1c 6.3,      chol 165, tg 91,   hdl 63, ldl-c 84  From 1/16 showed    gluc 114, cr 0.80, gfr 90,  alt 63, hba1c 6.5,      chol 166, tg 100, hdl 73, ldl-c 73,  wbc 3.9, hb 14.5, plt 165  From 4/16 showed          hba1c 6.5, umar 4     chol 164, tg 61,   hdl 75, ldl-c 77,        From 1/17 showed    gluc 101, cr 0.79, gfr>60, alt 33, hba1c 6.5, umar 6    chol 155, tg 70,   hdl 75, ldl-c 66,        From 9/17 showed          hba1c 6.2,      chol 150, tg 77,   hdl 63, ldl-c 72,  wbc 3.5, hb 13.3, plt 160  From 4/18 showed    gluc 107, cr 0.83, gfr>60, alt 35, hba1c 6.2, umar 9.5            psa 1.06  From 10/18 showed          hba1c 6.6,      chol 172, tg 85,   hdl 79, ldl-c 76,  wbc 3.9, hb 14.2, plt 172, vit d 42.9  From 4/19 showed    gluc 101, cr 0.73, gfr>60, alt 21, hba1c 6.2, umar 7    chol 164, tg 76,   hdl 79, ldl-c 70,  wbc 3.1, hb 13.5, plt 162, vit d 37.3  From 10/19 showed  gluc 106, cr 0.78, gfr>60,     hba1c 6.5  From 6/20 showed    gluc 106, cr 0.76, gfr>60, alt 24, hba1c 6.5, umar neg, chol 158, tg 70,   hdl 64, ldl-c 80,  wbc 3.5, hb 13.4, plt 178, vit d 33.0  From 12/20 showed  gluc 103, cr 0.67, gfr>60, alt 57  From 12/21 showed  gluc 89,   cr 0.80, gfr>60, alt 59, hba1c 6.5,      chol 161, tg 91,   hdl 58, ldl-c 85  From 6/22 showed    gluc 106, cr 0.80, gfr>60, alt 18, hba1c 6.7, umar neg, chol 169, tg 168, hdl 55, ldl-c 80,  wbc 4.3, hb 12.9, plt 245,   From 12/22 showed          hba1c 6.3,      chol 170, tg 105, hdl 56, ldl-c 93,  wbc 4.6, hb 13.0, plt 217    We reviewed the patient's labs from the last several visits to point out trends in the numbers          Patient Active Problem List   Diagnosis Code    Hyperlipidemia E78.5    Peripheral neuropathy Dr. Tay Jc, Dr. Reina Red G62.9    Hypovitaminosis D 2012 E55.9    Arthritis, degenerative M19.90    CAD s/p LAD stent 2007 Dr Flaco Carlson I25.10    Advance directive discussed with patient Z71.89    Colon polyp Dr Taran Cross 7/15 K63.5    Sinus bradycardia R00.1    Controlled type 2 diabetes mellitus with diabetic neuropathy, without long-term current use of insulin ([facility] [address] E11.40    GERD without esophagitis K21.9     Assessment and plan:  1. CHD. F/U Dr. Flaco Carlson, continue current regimen. 2.  Nephrolithiasis. Hydration  3. Neuropathy. F/U neurology as needed, stable  4. Hypovit D. Check level next draw  5. DM. Continue current regimen and doing well, f/u Dr Vernia Line  6. Dyslipidemia. Continue statin and zetia; discussed his upward emmanuel nd and changing to crestor would not get him to ld target whether we use 70 or 55 as goal.  Will try to get repatha again  7. GERD. Avoidance measures and ppi        RTC 6/23    Above conditions discussed at length and patient vocalized understanding.   All questions answered to patient satisfaction          ICD-10-CM ICD-9-CM    1. Coronary artery disease involving native coronary artery of native heart without angina pectoris  I25.10 414.01 LIPID PANEL      evolocumab (REPATHA) syringe      2. Hyperplastic colonic polyp, unspecified part of colon  K63.5 211.3 AMB POC FECAL BLOOD, OCCULT, QL 1 CARD      3. Controlled type 2 diabetes mellitus with diabetic neuropathy, without long-term current use of insulin ([facility])  V59.84 383.26 METABOLIC PANEL, COMPREHENSIVE     357.2 LIPID PANEL      MICROALBUMIN, UR, RAND W/ MICROALB/CREAT RATIO      HEMOGLOBIN A1C WITH EAG      4. Hyperlipidemia, unspecified hyperlipidemia type  E78.5 272.4 evolocumab (REPATHA) syringe      5. Peripheral polyneuropathy  G62.9 356.9       6.  Hypovitaminosis D 2012  E55.9 268.9 VITAMIN D, 25 HYDROXY          Forwarded note to Dr Jesica De La Torre per pt request

## 2022-12-02 NOTE — PROGRESS NOTES
Linette Rex presents today for   Chief Complaint   Patient presents with    Annual Wellness Visit    Coronary Artery Disease    GERD    Diabetes     Type 2 diabetes, controlled   With diabetic neuropathy     Peripheral Neuropathy    Cholesterol Problem     1. \"Have you been to the ER, urgent care clinic since your last visit? Hospitalized since your last visit? \" no    2. \"Have you seen or consulted any other health care providers outside of the [address] [place] [address] since your last visit? \" Yes, [facility]      3. For patients aged 39-70: Has the patient had a colonoscopy / FIT/ Cologuard? NA - based on age      If the patient is female:    4. For patients aged 41-77: Has the patient had a mammogram within the past 2 years? NA - based on age or sex  See top three    5. For patients aged 21-65: Has the patient had a pap smear?  NA - based on age or sex

## 2022-12-02 NOTE — PROGRESS NOTES
This is a Subsequent Medicare Annual Wellness Visit     I have reviewed the patient's medical history in detail and updated the computerized patient record. Assessment/Plan   Education and counseling provided:  Are appropriate based on today's review and evaluation  Influenza Vaccine  Prostate cancer screening tests (PSA, covered annually)  Colorectal cancer screening tests    1. Medicare annual wellness visit, subsequent  2. Coronary artery disease involving native coronary artery of native heart without angina pectoris  -     LIPID PANEL; Future  -     evolocumab (REPATHA) syringe; 1 mL by SubCUTAneous route every fourteen (14) days. , Normal, Disp-2 mL, R-11  3. Hyperplastic colonic polyp, unspecified part of colon  -     AMB POC FECAL BLOOD, OCCULT, QL 1 CARD  4. Controlled type 2 diabetes mellitus with diabetic neuropathy, without long-term current use of insulin (HCC)  -     METABOLIC PANEL, COMPREHENSIVE; Future  -     LIPID PANEL; Future  -     MICROALBUMIN, UR, RAND W/ MICROALB/CREAT RATIO; Future  -     HEMOGLOBIN A1C WITH EAG; Future  5. Hyperlipidemia, unspecified hyperlipidemia type  -     evolocumab (REPATHA) syringe; 1 mL by SubCUTAneous route every fourteen (14) days. , Normal, Disp-2 mL, R-11  6. Peripheral polyneuropathy  7. Hypovitaminosis D 2012  -     VITAMIN D, 25 HYDROXY; Future  8. Special screening for malignant neoplasm of prostate  Comments:  Discussed the potential benefits and harms of the prostate-specific antigen (PSA) serum level test as a screening tool for early prostate cancer detection.   Orders:  -     CT PROSTATE CA SCREENING; ROSY       Depression Risk Factor Screening     3 most recent PHQ Screens 12/5/2022   Little interest or pleasure in doing things Not at all   Feeling down, depressed, irritable, or hopeless Not at all   Total Score PHQ 2 0       Alcohol & Drug Abuse Risk Screen    Do you average more than 1 drink per night or more than 7 drinks a week: No    In the past three months have you have had more than 4 drinks containing alcohol on one occasion: No          Functional Ability and Level of Safety    Hearing: Hearing is good. Activities of Daily Living: The home contains: no safety equipment. Patient does total self care      Ambulation: with no difficulty     Fall Risk:  Fall Risk Assessment, last 12 mths 12/5/2022   Able to walk? Yes   Fall in past 12 months? 0   Do you feel unsteady? 0   Are you worried about falling 0   Number of falls in past 12 months -   Fall with injury?  -      Abuse Screen:  Patient is not abused       Cognitive Screening    Has your family/caregiver stated any concerns about your memory: no     Cognitive Screening: Normal - Clock Drawing Test    Health Maintenance Due     Health Maintenance Due   Topic Date Due    Hepatitis C Screening  Never done    COVID-19 Vaccine (5 - Booster for Rosenthal Peter series) 07/12/2022    Flu Vaccine (1) 08/01/2022       Patient Care Team   Patient Care Team:  Silas Paul MD as PCP - General (Internal Medicine Physician)  Silas Paul MD as PCP - [facility] Empaneled Provider  Evon Diaz RN as [facility] (Internal Medicine Physician)  Darleen Beasley MD (Cardiovascular Disease Physician)    History     Patient Active Problem List   Diagnosis Code    Hyperlipidemia E78.5    Peripheral neuropathy Dr. Mary Goodwin, Dr. Jovan Henson G62.9    Hypovitaminosis D 2012 E55.9    Arthritis, degenerative M19.90    CAD s/p LAD stent 2007 Dr Val Mendoza I25.10    Advance directive discussed with patient Z71.89    Colon polyp Dr Piper Posey 7/15 K63.5    Sinus bradycardia R00.1    Controlled type 2 diabetes mellitus with diabetic neuropathy, without long-term current use of insulin ([address] E11.40    GERD without esophagitis K21.9     Past Medical History:   Diagnosis Date    Atypical chest pain     2007, 2015    CAD (coronary artery disease) 2007    95% LAD stented to 0%     Carotid artery disease ([address] BICA<50% (2008); BICA<50% (4/16)    Colon polyps 2015    Dr Renae Rosas [facility]    COVID-19 virus infection 05/2022    DM (diabetes mellitus) ([address] 01/2015    on basis of hba1c    Dyslipidemia     GERD (gastroesophageal reflux disease) 10/2017    Gilbert's syndrome     Gout 2002    H/O cardiac catheterization 07/31/2014    LAD diffuse 20-30%. Prior pLAD stent patent. Otherwise < 20% coronary artery disease. LVEDP 10 mmHg. EF 55%. Minimal anteroapical hypk. H/O cardiovascular stress test     NST negative, ef 63% (12/13); stress echo neg, ef 57% [facility] (5/17); NST neg, ef 57% [facility] (9/17); NST low risk, ef 69%, TID 1 (11/19)    H/O echocardiogram     nl lv, ef 60%, mild MR, mild TR (12/13); nl lv, ef 56%, no wma, mild BRANDIE, pap 25 (11/19)    Lung nodule 09/2017    on CT; questionable 8mm RUL, neg aneurysm; subsequent CT 3/18 showed no nodule, just atelectasis    Nephrolithiasis     ureteral stone 2004 Dr. Declan Lemon, lithotripsy [facility] 2005    Osteoarthritis     Peripheral neuropathy Dr. Olga Edmonds, Dr. Payal Israel 10/11    Venous insufficiency     s/p left leg vein ablation Dr Charo Villalta 7/12    Vitamin D deficiency 2012      Past Surgical History:   Procedure Laterality Date    HX APPENDECTOMY      HX COLONOSCOPY      Dr Jordy Jovel 1/05 neg; Dr Willy Chicas polyps 7/15    HX GI      US abd negative 1/08; MRI abd neg 10/17    HX LITHOTRIPSY  07/05    HX ORTHOPAEDIC  2007    LEFT meniscus tear Dr. Romario Conley      surgery for undescended testicles    VASCULAR SURGERY PROCEDURE UNLIST  2006    neg community screening for PAD/AAA     Current Outpatient Medications   Medication Sig Dispense Refill    evolocumab (REPATHA) syringe 1 mL by SubCUTAneous route every fourteen (14) days. 2 mL 11    atorvastatin (LIPITOR) 80 mg tablet Take 1 Tablet by mouth daily. 90 Tablet 3    clopidogreL (Plavix) 75 mg tab Take 1 Tablet by mouth daily. 90 Tablet 3    ezetimibe (ZETIA) 10 mg tablet Take 1 Tablet by mouth daily.  90 Tablet 3 metFORMIN (GLUCOPHAGE) 500 mg tablet Take 1 Tablet by mouth two (2) times daily (with meals). 180 Tablet 3    glucose blood VI test strips (Precision Xtra Test) strip Pt to test blood sugar once daily. Dx: E11.9 100 Strip 3    lancets misc Pt to test blood sugar once daily. Dx: E11.9 100 Each 3    pantoprazole (PROTONIX) 40 mg tablet Take 1 Tablet by mouth daily. 90 Tablet 3    metoprolol succinate (Toprol XL) 25 mg XL tablet Take 1 Tablet by mouth daily. 90 Tablet 3    nitroglycerin (NITROSTAT) 0.4 mg SL tablet 1 Tablet by SubLINGual route every five (5) minutes as needed for Chest Pain. Up to 3 doses. 25 Tablet 3    Cholecalciferol, Vitamin D3, 5,000 unit Tab Take  by mouth daily. aspirin 81 mg tablet Take 81 mg by mouth daily.        No Known Allergies    Family History   Problem Relation Age of Onset    Heart Disease Father     Arthritis-rheumatoid Mother      Social History     Tobacco Use    Smoking status: Never    Smokeless tobacco: Never   Substance Use Topics    Alcohol use: Yes     Comment: rarely         Kimmy Sweet MD

## 2022-12-05 ENCOUNTER — OFFICE VISIT (OUTPATIENT)
Dept: INTERNAL MEDICINE CLINIC | Age: 77
End: 2022-12-05
Payer: MEDICARE

## 2022-12-05 ENCOUNTER — TELEPHONE (OUTPATIENT)
Dept: INTERNAL MEDICINE CLINIC | Age: 77
End: 2022-12-05

## 2022-12-05 VITALS
HEART RATE: 66 BPM | DIASTOLIC BLOOD PRESSURE: 64 MMHG | BODY MASS INDEX: 23.1 KG/M2 | HEIGHT: 71 IN | SYSTOLIC BLOOD PRESSURE: 112 MMHG | WEIGHT: 165 LBS | OXYGEN SATURATION: 98 % | TEMPERATURE: 98.3 F | RESPIRATION RATE: 18 BRPM

## 2022-12-05 DIAGNOSIS — Z00.00 MEDICARE ANNUAL WELLNESS VISIT, SUBSEQUENT: Primary | ICD-10-CM

## 2022-12-05 DIAGNOSIS — E78.5 HYPERLIPIDEMIA, UNSPECIFIED HYPERLIPIDEMIA TYPE: ICD-10-CM

## 2022-12-05 DIAGNOSIS — E11.40 CONTROLLED TYPE 2 DIABETES MELLITUS WITH DIABETIC NEUROPATHY, WITHOUT LONG-TERM CURRENT USE OF INSULIN (HCC): ICD-10-CM

## 2022-12-05 DIAGNOSIS — Z12.5 SPECIAL SCREENING FOR MALIGNANT NEOPLASM OF PROSTATE: ICD-10-CM

## 2022-12-05 DIAGNOSIS — K63.5 HYPERPLASTIC COLONIC POLYP, UNSPECIFIED PART OF COLON: ICD-10-CM

## 2022-12-05 DIAGNOSIS — G62.9 PERIPHERAL POLYNEUROPATHY: ICD-10-CM

## 2022-12-05 DIAGNOSIS — E55.9 HYPOVITAMINOSIS D: ICD-10-CM

## 2022-12-05 DIAGNOSIS — I25.10 CORONARY ARTERY DISEASE INVOLVING NATIVE CORONARY ARTERY OF NATIVE HEART WITHOUT ANGINA PECTORIS: ICD-10-CM

## 2022-12-05 LAB
HEMOCCULT STL QL: NEGATIVE
VALID INTERNAL CONTROL?: YES

## 2022-12-05 PROCEDURE — G0463 HOSPITAL OUTPT CLINIC VISIT: HCPCS | Performed by: INTERNAL MEDICINE

## 2022-12-05 PROCEDURE — G8427 DOCREV CUR MEDS BY ELIG CLIN: HCPCS | Performed by: INTERNAL MEDICINE

## 2022-12-05 PROCEDURE — 82272 OCCULT BLD FECES 1-3 TESTS: CPT | Performed by: INTERNAL MEDICINE

## 2022-12-05 PROCEDURE — 1123F ACP DISCUSS/DSCN MKR DOCD: CPT | Performed by: INTERNAL MEDICINE

## 2022-12-05 PROCEDURE — G8536 NO DOC ELDER MAL SCRN: HCPCS | Performed by: INTERNAL MEDICINE

## 2022-12-05 PROCEDURE — 99214 OFFICE O/P EST MOD 30 MIN: CPT | Performed by: INTERNAL MEDICINE

## 2022-12-05 PROCEDURE — G8420 CALC BMI NORM PARAMETERS: HCPCS | Performed by: INTERNAL MEDICINE

## 2022-12-05 PROCEDURE — G8510 SCR DEP NEG, NO PLAN REQD: HCPCS | Performed by: INTERNAL MEDICINE

## 2022-12-05 PROCEDURE — 1101F PT FALLS ASSESS-DOCD LE1/YR: CPT | Performed by: INTERNAL MEDICINE

## 2022-12-05 PROCEDURE — G0439 PPPS, SUBSEQ VISIT: HCPCS | Performed by: INTERNAL MEDICINE

## 2022-12-05 PROCEDURE — 3044F HG A1C LEVEL LT 7.0%: CPT | Performed by: INTERNAL MEDICINE

## 2022-12-05 NOTE — PATIENT INSTRUCTIONS
Medicare Wellness Visit, Male    The best way to live healthy is to have a lifestyle where you eat a well-balanced diet, exercise regularly, limit alcohol use, and quit all forms of tobacco/nicotine, if applicable. Regular preventive services are another way to keep healthy. Preventive services (vaccines, screening tests, monitoring & exams) can help personalize your care plan, which helps you manage your own care. Screening tests can find health problems at the earliest stages, when they are easiest to treat. [facility] follows the current, evidence-based guidelines published by the [facility] ([facility]) when recommending preventive services for our patients. Because we follow these guidelines, sometimes recommendations change over time as research supports it. (For example, a prostate screening blood test is no longer routinely recommended for men with no symptoms). Of course, you and your doctor may decide to screen more often for some diseases, based on your risk and co-morbidities (chronic disease you are already diagnosed with). Preventive services for you include:  - Medicare offers their members a free annual wellness visit, which is time for you and your primary care provider to discuss and plan for your preventive service needs.  Take advantage of this benefit every year!    -Over the age of 72 should receive the recommended pneumonia vaccines.   -All adults should have a flu vaccine yearly.  -All adults should receive a tetanus vaccine every 10 years.  -Over the age of 48 should receive the shingles vaccines.    -All adults should be screened once for Hepatitis C.  -All adults age 38-68 who are overweight should have a diabetes screening test once every three years.   -Other screening tests & preventive services for persons with diabetes include: an eye exam to screen for diabetic retinopathy, a kidney function test, a foot exam, and stricter control over your cholesterol.   -Cardiovascular screening for adults with routine risk involves an electrocardiogram (ECG) at intervals determined by the provider.     -Colorectal cancer screening should be done for adults age 43-69 with no increased risk factors for colorectal cancer. There are a number of acceptable methods of screening for this type of cancer. Each test has its own benefits and drawbacks. Discuss with your provider what is most appropriate for you during your annual wellness visit. The different tests include: colonoscopy (considered the best screening method), a fecal occult blood test, a fecal DNA test, and sigmoidoscopy.    -Lung cancer screening is recommended annually with a low dose CT scan for adults between age 54 and 68, who have smoked at least 30 pack years (equivalent of 1 pack per day for 30 days), and who is a current smoker or quit less than 15 years ago. -An Abdominal Aortic Aneurysm (AAA) Screening is recommended for men age 73-68 who has ever smoked in their lifetime.      Here is a list of your current Health Maintenance items (your personalized list of preventive services) with a due date:  Health Maintenance Due   Topic Date Due    Hepatitis C Test  Never done    COVID-19 Vaccine (5 - Booster for Rosenthal Peter series) 07/12/2022    Yearly Flu Vaccine (1) 08/01/2022

## 2022-12-06 ENCOUNTER — TELEPHONE (OUTPATIENT)
Dept: INTERNAL MEDICINE CLINIC | Age: 77
End: 2022-12-06

## 2022-12-07 NOTE — TELEPHONE ENCOUNTER
Patient is needing a Prior Authorization     evolocumab (REPATHA) syringe. The second question is stating a cardiologist or an Endocrinologist must prescribe the medication. Patient is bringing in the Prior Auth form the [facility] gave him.

## 2022-12-09 ENCOUNTER — TELEPHONE (OUTPATIENT)
Dept: CARDIOLOGY CLINIC | Age: 77
End: 2022-12-09

## 2022-12-09 NOTE — TELEPHONE ENCOUNTER
Patient called requesting to speak with Nurse [address] regarding the prior auth. He is requesting a return call and can be reached at [phone].   Thank you

## 2022-12-12 NOTE — TELEPHONE ENCOUNTER
Patient came by and dropped off the Prior Auth form , placed in box. He is requesting to speak with Nurse Alex and can be reached at [phone].   Thank you

## 2022-12-13 NOTE — TELEPHONE ENCOUNTER
Prior Auth form is on Dr. iKm Stanford desk. Patient advised to seek Cardiologist approval as well. Will contact patient once I have more answers.

## 2022-12-14 ENCOUNTER — OFFICE VISIT (OUTPATIENT)
Dept: CARDIOLOGY CLINIC | Age: 77
End: 2022-12-14
Payer: MEDICARE

## 2022-12-14 VITALS
BODY MASS INDEX: 22.9 KG/M2 | HEART RATE: 60 BPM | DIASTOLIC BLOOD PRESSURE: 80 MMHG | HEIGHT: 71 IN | SYSTOLIC BLOOD PRESSURE: 128 MMHG | OXYGEN SATURATION: 97 % | WEIGHT: 163.6 LBS

## 2022-12-14 DIAGNOSIS — R55 SYNCOPE, UNSPECIFIED SYNCOPE TYPE: ICD-10-CM

## 2022-12-14 DIAGNOSIS — I25.10 CORONARY ARTERY DISEASE INVOLVING NATIVE CORONARY ARTERY OF NATIVE HEART WITHOUT ANGINA PECTORIS: Primary | ICD-10-CM

## 2022-12-14 DIAGNOSIS — R00.1 SINUS BRADYCARDIA: ICD-10-CM

## 2022-12-14 DIAGNOSIS — R06.02 SOB (SHORTNESS OF BREATH): ICD-10-CM

## 2022-12-14 PROCEDURE — G8536 NO DOC ELDER MAL SCRN: HCPCS | Performed by: INTERNAL MEDICINE

## 2022-12-14 PROCEDURE — G8420 CALC BMI NORM PARAMETERS: HCPCS | Performed by: INTERNAL MEDICINE

## 2022-12-14 PROCEDURE — G8510 SCR DEP NEG, NO PLAN REQD: HCPCS | Performed by: INTERNAL MEDICINE

## 2022-12-14 PROCEDURE — G8427 DOCREV CUR MEDS BY ELIG CLIN: HCPCS | Performed by: INTERNAL MEDICINE

## 2022-12-14 PROCEDURE — 1123F ACP DISCUSS/DSCN MKR DOCD: CPT | Performed by: INTERNAL MEDICINE

## 2022-12-14 PROCEDURE — 93000 ELECTROCARDIOGRAM COMPLETE: CPT | Performed by: INTERNAL MEDICINE

## 2022-12-14 PROCEDURE — 1101F PT FALLS ASSESS-DOCD LE1/YR: CPT | Performed by: INTERNAL MEDICINE

## 2022-12-14 PROCEDURE — 99214 OFFICE O/P EST MOD 30 MIN: CPT | Performed by: INTERNAL MEDICINE

## 2022-12-14 NOTE — PROGRESS NOTES
HISTORY OF PRESENT ILLNESS  Tri Taylor is a 65 y.o. male. ASSESSMENT and PLAN    Mr. Srinivas Munoz has history of CAD. He has never had chest pains or angina equivalent. Back in 0532, he had 64 slice CT scan for unclear reasons. This revealed significant calcification. He subsequently underwent evaluation for CAD despite the fact that he had no symptoms. His evaluation revealed severe LAD disease. He subsequently had LAD stent performed in 2007 and has done fairly well since that time. He remains active physically. Because of recurrent episodes of chest pain, he was readmitted to [facility] in July of 2014; he subsequent underwent repeat coronary angiography which revealed patent LAD stent, without significant, occlusive CAD. Reassurance was given. He presented to the hospital in [facility] with abdominal discomfort. He was concerned about possible coronary syndrome. This was in early part of May 2017. He ruled out. He had a stress echocardiogram which was reported to him as normal.  In September 2017, he presented to [place] emergency room with atypical chest pains. They ruled out acute coronary event and subsequently discharged the patient home. He has had multiple presentations to the emergency room for atypical chest pains over the last 2 years. In November 2019, he had exercise nuclear scan as well as echocardiogram.  Nuclear scan showed EF of 69% without any perfusion abnormalities. His echocardiogram shows normal LV function without wall motion abnormality. In December 2022, he had nuclear stress test performed. This showed normal perfusion with EF greater than 70%. CAD:   Clinically stable. BP:   Well controlled at 128/80. Rhythm:   Stable sinus rhythm at 60 bpm.  CHF:    There is no evidence of decompensated CHF noted. Weight:    His weight today is 164 pounds. His baseline weight is 170 pounds. Advised him not to lose any further weight.   Cholesterol:   Target LDL <70.  Lipitor 80, Zetia 10, and Repatha. It is managed by his PCP. However, we will be happy to facilitate any wound. Anti-platelet:   Remains on ASAA, and Plavix. I will see him back annually. Thank you. Encounter Diagnoses   Name Primary?  Coronary artery disease involving native coronary artery of native heart without angina pectoris Yes    SOB (shortness of breath)     Syncope, unspecified syncope type     Sinus bradycardia      current treatment plan is effective, no change in therapy  lab results and schedule of future lab studies reviewed with patient  reviewed diet, exercise and weight control  cardiovascular risk and specific lipid/LDL goals reviewed  use of aspirin to prevent MI and TIA's discussed    Follow-up  Pertinent negatives include no chest pain and no shortness of breath. Today, Mr. Oli Ayala has no complaints of chest pains, increased shortness of breath, or decreased exercise capacity. He is struggling with some itching on his scalp. He is seeing a dermatologist.  He has no cardiac complaints. He denies any orthopnea or PND. He denies any palpitations or dizziness. He does feel that he is gradually slowing down as he ages. He has not noted any sudden changes. Review of Systems   Respiratory:  Negative for shortness of breath. Cardiovascular:  Negative for chest pain, palpitations, orthopnea, claudication, leg swelling and PND. All other systems reviewed and are negative. Physical Exam  Vitals reviewed. Constitutional:       Appearance: Normal appearance. HENT:      Head: Normocephalic. Eyes:      Conjunctiva/sclera: Conjunctivae normal.   Neck:      Vascular: No carotid bruit. Cardiovascular:      Rate and Rhythm: Normal rate and regular rhythm. Pulmonary:      Breath sounds: Normal breath sounds. Abdominal:      Palpations: Abdomen is soft. Musculoskeletal:         General: No swelling. Cervical back: No rigidity.    Skin:     General: Skin is warm and dry. Neurological:      General: No focal deficit present. Mental Status: He is alert and oriented to person, place, and time. Psychiatric:         Mood and Affect: Mood normal.         Behavior: Behavior normal.       PCP: Maria T Diaz MD    Past Medical History:   Diagnosis Date    Atypical chest pain     2007, 2015    CAD (coronary artery disease) 2007    95% LAD stented to 0%     Carotid artery disease ([facility]     BICA<50% (2008); BICA<50% (4/16)    Colon polyps 2015    Dr Kandi Najjar [facility]    COVID-19 virus infection 05/2022    DM (diabetes mellitus) ([facility] 01/2015    on basis of hba1c    Dyslipidemia     GERD (gastroesophageal reflux disease) 10/2017    Gilbert's syndrome     Gout 2002    H/O cardiac catheterization 07/31/2014    LAD diffuse 20-30%. Prior pLAD stent patent. Otherwise < 20% coronary artery disease. LVEDP 10 mmHg. EF 55%. Minimal anteroapical hypk.       H/O cardiovascular stress test     NST negative, ef 63% (12/13); stress echo neg, ef 57% [facility] (5/17); NST neg, ef 57% [facility] (9/17); NST low risk, ef 69%, TID 1 (11/19)    H/O echocardiogram     nl lv, ef 60%, mild MR, mild TR (12/13); nl lv, ef 56%, no wma, mild BRANDIE, pap 25 (11/19)    Lung nodule 09/2017    on CT; questionable 8mm RUL, neg aneurysm; subsequent CT 3/18 showed no nodule, just atelectasis    Nephrolithiasis     ureteral stone 2004 Dr. Camila Shepard, lithotripsy [employer] 2005    Osteoarthritis     Peripheral neuropathy  [employer], Dr. Shayla Segal 10/11    Venous insufficiency     s/p left leg vein ablation Dr Lobo Tinajero 7/12    Vitamin D deficiency 2012       Past Surgical History:   Procedure Laterality Date    Agnes Cote 1/05 neg; Dr Héctor Wilkerson polyps 7/15    HX GI      US abd negative 1/08; MRI abd neg 10/17    HX LITHOTRIPSY  07/05    HX ORTHOPAEDIC  2007    LEFT meniscus tear Dr. Palm Page  [address]      surgery for undescended testicles    VASCULAR SURGERY PROCEDURE UNLIST  2006    [facility] screening for PAD/AAA       Current Outpatient Medications   Medication Sig Dispense Refill    evolocumab (REPATHA) syringe 1 mL by SubCUTAneous route every fourteen (14) days. 2 mL 11    atorvastatin (LIPITOR) 80 mg tablet Take 1 Tablet by mouth daily. 90 Tablet 3    clopidogreL (Plavix) 75 mg tab Take 1 Tablet by mouth daily. 90 Tablet 3    ezetimibe (ZETIA) 10 mg tablet Take 1 Tablet by mouth daily. 90 Tablet 3    metFORMIN (GLUCOPHAGE) 500 mg tablet Take 1 Tablet by mouth two (2) times daily (with meals). 180 Tablet 3    glucose blood VI test strips (Precision Xtra Test) strip Pt to test blood sugar once daily. Dx: E11.9 100 Strip 3    lancets misc Pt to test blood sugar once daily. Dx: E11.9 100 Each 3    pantoprazole (PROTONIX) 40 mg tablet Take 1 Tablet by mouth daily. 90 Tablet 3    metoprolol succinate (Toprol XL) 25 mg XL tablet Take 1 Tablet by mouth daily. 90 Tablet 3    nitroglycerin (NITROSTAT) 0.4 mg SL tablet 1 Tablet by SubLINGual route every five (5) minutes as needed for Chest Pain. Up to 3 doses. 25 Tablet 3    Cholecalciferol, Vitamin D3, 5,000 unit Tab Take  by mouth daily.  aspirin 81 mg tablet Take 81 mg by mouth daily.          The patient has a family history of    Social History     Tobacco Use    Smoking status: Never    Smokeless tobacco: Never   Vaping Use    Vaping Use: Never used   Substance Use Topics    Alcohol use: Yes     Comment: rarely    Drug use: No       Lab Results   Component Value Date/Time    Cholesterol, total 157 07/30/2014 04:14 AM    HDL Cholesterol 74 (H) 07/30/2014 04:14 AM    LDL, calculated 65.6 07/30/2014 04:14 AM    Triglyceride 87 07/30/2014 04:14 AM    CHOL/HDL Ratio 2.1 07/30/2014 04:14 AM        BP Readings from Last 3 Encounters:   12/14/22 128/80   12/12/22 100/60   12/05/22 112/64        Pulse Readings from Last 3 Encounters:   12/14/22 60   12/05/22 66   06/10/22 61       Wt Readings from Last 3 Encounters:   12/14/22 74.2 kg (163 lb 9.6 oz)   12/12/22 74.8 kg (165 lb)   12/05/22 74.8 kg (165 lb)         EKG: unchanged from previous tracings, normal sinus rhythm, RBBB.

## 2022-12-19 NOTE — TELEPHONE ENCOUNTER
Pt calling to check on the status of his prior authorization form , he is asking to speak with nurse he states he needs to hand delivery it to the [facility] it can not be faxed .

## 2022-12-21 ENCOUNTER — TELEPHONE (OUTPATIENT)
Dept: INTERNAL MEDICINE CLINIC | Age: 77
End: 2022-12-21

## 2022-12-21 NOTE — TELEPHONE ENCOUNTER
[facility] , a referral has been placed for Makeda Shelby, left [facility] to schedule a new patient appt with Dr. Anibal Myles for patient to go over his Repatha.

## 2022-12-21 NOTE — TELEPHONE ENCOUNTER
Pt calling, says he wasn't aware this would be with the pharmD. He thought a nurse would just show him. Next 1 hour appt is 1/18. He says he leaves town for extended time on 1/10.  Can you see in a 30 minute slot or please advise

## 2022-12-21 NOTE — TELEPHONE ENCOUNTER
Patient contacted and advised, the Prior Auth form was filled out by Dr. Thompson Mojica. Dr. Thompson Mojica wants Dr. Maritza Lozada to prescribe repatha. Dr. Maritza Lozada has already filled out the Prior Auth, and prescribed the medication. Patient is at [facility], picking up the medication now. Meri Luther

## 2023-01-03 NOTE — PROGRESS NOTES
Pharmacy Progress Note - Repatha injection Education     S/O: Mr. Amira Ribeiro is a 65 y.o. referred by Cyndee Scott MD for Repatha injection teaching. Past Medical History:   Diagnosis Date    Atypical chest pain     2007, 2015    CAD (coronary artery disease) 2007    95% LAD stented to 0%     Carotid artery disease ([facility]     BICA<50% (2008); BICA<50% (4/16)    Colon polyps 2015    Dr Lance [facility]    COVID-19 virus infection 05/2022    DM (diabetes mellitus) ([facility] 01/2015    on basis of hba1c    Dyslipidemia     GERD (gastroesophageal reflux disease) 10/2017    Gilbert's syndrome     Gout 2002    H/O cardiac catheterization 07/31/2014    LAD diffuse 20-30%. Prior pLAD stent patent. Otherwise < 20% coronary artery disease. LVEDP 10 mmHg. EF 55%. Minimal anteroapical hypk. H/O cardiovascular stress test     NST negative, ef 63% (12/13); stress echo neg, ef 57% [facility] (5/17); NST neg, ef 57% [facility] (9/17); NST low risk, ef 69%, TID 1 (11/19)    H/O echocardiogram     nl lv, ef 60%, mild MR, mild TR (12/13); nl lv, ef 56%, no wma, mild BRANDIE, pap 25 (11/19)    Lung nodule 09/2017    on CT; questionable 8mm RUL, neg aneurysm; subsequent CT 3/18 showed no nodule, just atelectasis    Nephrolithiasis     ureteral stone 2004 Dr. Poppy Eduardo, lithotripsy [facility] 2005    Osteoarthritis     Peripheral neuropathy Dr. Jasmeet Llanos, Dr. Louisa Garcia 10/11    Venous insufficiency     s/p left leg vein ablation Dr Magalys Crabtree 7/12    Vitamin D deficiency 2012     No Known Allergies    Current Outpatient Medications   Medication Sig    evolocumab (REPATHA) syringe 1 mL by SubCUTAneous route every fourteen (14) days. atorvastatin (LIPITOR) 80 mg tablet Take 1 Tablet by mouth daily. clopidogreL (Plavix) 75 mg tab Take 1 Tablet by mouth daily. ezetimibe (ZETIA) 10 mg tablet Take 1 Tablet by mouth daily. metFORMIN (GLUCOPHAGE) 500 mg tablet Take 1 Tablet by mouth two (2) times daily (with meals).     glucose blood VI test strips (Precision Xtra Test) strip Pt to test blood sugar once daily. Dx: E11.9    lancets misc Pt to test blood sugar once daily. Dx: E11.9    pantoprazole (PROTONIX) 40 mg tablet Take 1 Tablet by mouth daily. metoprolol succinate (Toprol XL) 25 mg XL tablet Take 1 Tablet by mouth daily. nitroglycerin (NITROSTAT) 0.4 mg SL tablet 1 Tablet by SubLINGual route every five (5) minutes as needed for Chest Pain. Up to 3 doses. Cholecalciferol, Vitamin D3, 5,000 unit Tab Take  by mouth daily. aspirin 81 mg tablet Take 81 mg by mouth daily. No current facility-administered medications for this visit. A/P:  - Repatha injection successfully performed in the office with good technique. Pt verbalized understanding of the instructions and is comfortable injecting on his own going forward. Patient verbalized understanding of the information presented and all of the patients questions were answered. AVS was handed to the patient. Patient advised to call the office with any additional questions or concerns. Notifications of recommendations will be sent to Dr. Jasen Acevedo MD for review.       Thank you,    Theresa Gonsalez, PharmD, BCACP, BC-[facility]            For Pharmacy Admin Tracking Only    Program: Medical Group  CPA in place: Yes  Recommendation Provided To: Patient/Caregiver: 1 via In person  Intervention Detail: Device Training  Intervention Accepted By: Patient/Caregiver: 1  Gap Closed?: Yes  Time Spent (min): 30

## 2023-01-04 ENCOUNTER — OFFICE VISIT (OUTPATIENT)
Dept: INTERNAL MEDICINE CLINIC | Age: 78
End: 2023-01-04

## 2023-01-04 DIAGNOSIS — E78.5 HYPERLIPIDEMIA, UNSPECIFIED HYPERLIPIDEMIA TYPE: Primary | ICD-10-CM

## 2023-01-19 ENCOUNTER — APPOINTMENT (OUTPATIENT)
Dept: URBAN - NONMETROPOLITAN AREA CLINIC 46 | Age: 78
Setting detail: DERMATOLOGY
End: 2023-01-24

## 2023-01-19 DIAGNOSIS — L57.0 ACTINIC KERATOSIS: ICD-10-CM

## 2023-01-19 DIAGNOSIS — L40.0 PSORIASIS VULGARIS: ICD-10-CM

## 2023-01-19 DIAGNOSIS — Z85.828 PERSONAL HISTORY OF OTHER MALIGNANT NEOPLASM OF SKIN: ICD-10-CM

## 2023-01-19 PROCEDURE — 99204 OFFICE O/P NEW MOD 45 MIN: CPT

## 2023-01-19 PROCEDURE — OTHER COUNSELING: OTHER

## 2023-01-19 PROCEDURE — OTHER PRESCRIPTION: OTHER

## 2023-01-19 RX ORDER — FLUOROURACIL 5 MG/G
CREAM TOPICAL
Qty: 40 | Refills: 0 | COMMUNITY
Start: 2023-01-19

## 2023-01-19 ASSESSMENT — LOCATION DETAILED DESCRIPTION DERM
LOCATION DETAILED: LEFT LATERAL DORSAL WRIST
LOCATION DETAILED: LEFT MEDIAL FOREHEAD
LOCATION DETAILED: MID-FRONTAL SCALP
LOCATION DETAILED: LEFT ULNAR DORSAL HAND
LOCATION DETAILED: RIGHT RADIAL DORSAL HAND

## 2023-01-19 ASSESSMENT — LOCATION SIMPLE DESCRIPTION DERM
LOCATION SIMPLE: LEFT FOREHEAD
LOCATION SIMPLE: LEFT HAND
LOCATION SIMPLE: RIGHT HAND
LOCATION SIMPLE: ANTERIOR SCALP
LOCATION SIMPLE: LEFT WRIST

## 2023-01-19 ASSESSMENT — LOCATION ZONE DERM
LOCATION ZONE: HAND
LOCATION ZONE: SCALP
LOCATION ZONE: FACE
LOCATION ZONE: ARM

## 2023-01-19 NOTE — PROCEDURE: COUNSELING
Detail Level: Detailed
Detail Level: Zone
Cleansers Recommendations: Pt has clob solution at home, will continue using. Also planning to repeat xtrac laser to scalp when back in [place] in April.
Sunscreen Recommendations: 50+

## 2023-01-19 NOTE — HPI: HISTORY OF BASAL CELL CARCINOMA
What Is The Reason For Today's Visit?: Follow Up Basal Cell Carcinoma
How Many Bccs Have You Had?: one
When Was Your Last Cancer Diagnosed?: 8/2022

## 2023-02-04 DIAGNOSIS — E11.40 CONTROLLED TYPE 2 DIABETES MELLITUS WITH DIABETIC NEUROPATHY, WITHOUT LONG-TERM CURRENT USE OF INSULIN (HCC): Primary | ICD-10-CM

## 2023-02-04 DIAGNOSIS — E55.9 HYPOVITAMINOSIS D: Primary | ICD-10-CM

## 2023-02-05 DIAGNOSIS — I25.10 CORONARY ARTERY DISEASE INVOLVING NATIVE CORONARY ARTERY OF NATIVE HEART WITHOUT ANGINA PECTORIS: Primary | ICD-10-CM

## 2023-02-05 DIAGNOSIS — E11.40 CONTROLLED TYPE 2 DIABETES MELLITUS WITH DIABETIC NEUROPATHY, WITHOUT LONG-TERM CURRENT USE OF INSULIN (HCC): ICD-10-CM

## 2023-04-05 NOTE — TELEPHONE ENCOUNTER
Pt needs 90 days supply for each [employer] Prescriptions     Pending Prescriptions Disp Refills    metoprolol succinate (TOPROL XL) 25 MG extended release tablet 30 tablet      Sig: Take 1 tablet by mouth daily    atorvastatin (LIPITOR) 80 MG tablet 30 tablet      Sig: Take 1 tablet by mouth daily

## 2023-04-06 RX ORDER — ATORVASTATIN CALCIUM 80 MG/1
80 TABLET, FILM COATED ORAL DAILY
Qty: 90 TABLET | Refills: 3 | Status: SHIPPED | OUTPATIENT
Start: 2023-04-06

## 2023-04-06 RX ORDER — METOPROLOL SUCCINATE 25 MG/1
25 TABLET, EXTENDED RELEASE ORAL DAILY
Qty: 90 TABLET | Refills: 3 | Status: SHIPPED | OUTPATIENT
Start: 2023-04-06

## 2023-05-27 NOTE — TELEPHONE ENCOUNTER
Chief Complaint   Patient presents with    Medication Problem     patient on Plavix and Cardiology is prescribing Protonix and taking paitent off Prilosec due to FDA Warning Never

## 2023-06-07 ENCOUNTER — HOSPITAL ENCOUNTER (OUTPATIENT)
Facility: HOSPITAL | Age: 78
Setting detail: SPECIMEN
Discharge: HOME | End: 2023-06-10
Payer: MEDICARE

## 2023-06-07 DIAGNOSIS — I25.10 CORONARY ARTERY DISEASE INVOLVING NATIVE CORONARY ARTERY OF NATIVE HEART WITHOUT ANGINA PECTORIS: ICD-10-CM

## 2023-06-07 DIAGNOSIS — E11.40 CONTROLLED TYPE 2 DIABETES MELLITUS WITH DIABETIC NEUROPATHY, WITHOUT LONG-TERM CURRENT USE OF INSULIN (HCC): ICD-10-CM

## 2023-06-07 DIAGNOSIS — E55.9 HYPOVITAMINOSIS D: ICD-10-CM

## 2023-06-07 LAB
25(OH)D3 SERPL-MCNC: 50.2 NG/ML (ref 30–100)
ALBUMIN SERPL-MCNC: 3.7 G/DL (ref 3.4–5)
ALBUMIN/GLOB SERPL: 1 (ref 0.8–1.7)
ALP SERPL-CCNC: 65 U/L (ref 45–117)
ALT SERPL-CCNC: 25 U/L (ref 16–61)
ANION GAP SERPL CALC-SCNC: 6 MMOL/L (ref 3–18)
AST SERPL-CCNC: 33 U/L (ref 10–38)
BILIRUB SERPL-MCNC: 1.5 MG/DL (ref 0.2–1)
BUN SERPL-MCNC: 16 MG/DL (ref 7–18)
BUN/CREAT SERPL: 18 (ref 12–20)
CALCIUM SERPL-MCNC: 9.7 MG/DL (ref 8.5–10.1)
CHLORIDE SERPL-SCNC: 105 MMOL/L (ref 100–111)
CHOLEST SERPL-MCNC: 106 MG/DL
CO2 SERPL-SCNC: 27 MMOL/L (ref 21–32)
CREAT SERPL-MCNC: 0.88 MG/DL (ref 0.6–1.3)
CREAT UR-MCNC: 146 MG/DL (ref 30–125)
GLOBULIN SER CALC-MCNC: 3.6 G/DL (ref 2–4)
GLUCOSE SERPL-MCNC: 98 MG/DL (ref 74–99)
HDLC SERPL-MCNC: 94 MG/DL (ref 40–60)
HDLC SERPL: 1.1 (ref 0–5)
LDLC SERPL CALC-MCNC: 2 MG/DL (ref 0–100)
LIPID PANEL: ABNORMAL
MICROALBUMIN UR-MCNC: 1.45 MG/DL (ref 0–3)
MICROALBUMIN/CREAT UR-RTO: 10 MG/G (ref 0–30)
POTASSIUM SERPL-SCNC: 4.4 MMOL/L (ref 3.5–5.5)
PROT SERPL-MCNC: 7.3 G/DL (ref 6.4–8.2)
SODIUM SERPL-SCNC: 138 MMOL/L (ref 136–145)
TRIGL SERPL-MCNC: 50 MG/DL
VLDLC SERPL CALC-MCNC: 10 MG/DL

## 2023-06-07 PROCEDURE — 82043 UR ALBUMIN QUANTITATIVE: CPT

## 2023-06-07 PROCEDURE — 36415 COLL VENOUS BLD VENIPUNCTURE: CPT

## 2023-06-07 PROCEDURE — 80053 COMPREHEN METABOLIC PANEL: CPT

## 2023-06-07 PROCEDURE — 80061 LIPID PANEL: CPT

## 2023-06-07 PROCEDURE — 82570 ASSAY OF URINE CREATININE: CPT

## 2023-06-07 PROCEDURE — 82306 VITAMIN D 25 HYDROXY: CPT

## 2023-07-03 ENCOUNTER — TELEPHONE (OUTPATIENT)
Age: 78
End: 2023-07-03

## 2023-07-03 DIAGNOSIS — E11.40 CONTROLLED TYPE 2 DIABETES MELLITUS WITH DIABETIC NEUROPATHY, WITHOUT LONG-TERM CURRENT USE OF INSULIN (HCC): Primary | ICD-10-CM

## 2023-07-03 DIAGNOSIS — K21.9 GASTRO-ESOPHAGEAL REFLUX DISEASE WITHOUT ESOPHAGITIS: ICD-10-CM

## 2023-07-03 RX ORDER — LANCETS 30 GAUGE
EACH MISCELLANEOUS
Qty: 100 EACH | Refills: 0 | Status: SHIPPED | OUTPATIENT
Start: 2023-07-03

## 2023-07-03 RX ORDER — PANTOPRAZOLE SODIUM 40 MG/1
40 TABLET, DELAYED RELEASE ORAL DAILY
Qty: 30 TABLET | Refills: 0 | Status: SHIPPED | OUTPATIENT
Start: 2023-07-03

## 2023-07-03 NOTE — TELEPHONE ENCOUNTER
Please send refills to [facility].    lancets misc 100 Each 3 5/27/2022     Sig: Pt to test blood sugar once daily. Dx: E11.9    Sent to pharmacy as: lancets        pantoprazole (PROTONIX) 40 mg tablet 90 Tablet 3 5/16/2022     Sig - Route:  Take 1 Tablet by mouth daily. - Oral    Sent to pharmacy as: pantoprazole 40 mg tablet,delayed release (PROTONIX)

## 2023-07-06 ENCOUNTER — TELEPHONE (OUTPATIENT)
Age: 78
End: 2023-07-06

## 2023-07-06 DIAGNOSIS — E11.40 CONTROLLED TYPE 2 DIABETES MELLITUS WITH DIABETIC NEUROPATHY, WITHOUT LONG-TERM CURRENT USE OF INSULIN (HCC): Primary | ICD-10-CM

## 2023-07-06 NOTE — TELEPHONE ENCOUNTER
Requested refill:    - metFORMIN (GLUCOPHAGE) 500 MG tablet    Pharmacy: [address] [place] [address]

## 2023-07-25 ENCOUNTER — TELEPHONE (OUTPATIENT)
Age: 78
End: 2023-07-25

## 2023-07-25 DIAGNOSIS — K21.9 GASTRO-ESOPHAGEAL REFLUX DISEASE WITHOUT ESOPHAGITIS: ICD-10-CM

## 2023-07-25 RX ORDER — PANTOPRAZOLE SODIUM 40 MG/1
40 TABLET, DELAYED RELEASE ORAL DAILY
Qty: 90 TABLET | Refills: 3 | Status: SHIPPED | OUTPATIENT
Start: 2023-07-25

## 2023-07-25 NOTE — TELEPHONE ENCOUNTER
Requested refill:   - pantoprazole (PROTONIX) 40 MG tablet    Pharmacy: [address] [place] [address]     Patient is requesting a 90 day supply instead of 30, please advise

## 2023-08-15 ENCOUNTER — TELEPHONE (OUTPATIENT)
Age: 78
End: 2023-08-15

## 2023-08-15 NOTE — TELEPHONE ENCOUNTER
Pt called with questions concerning his medication PRANTOPRAZOLE  40 MG - he said he has read a study stating people should not take it for more than 4 years please advise

## 2023-09-19 ENCOUNTER — TELEPHONE (OUTPATIENT)
Age: 78
End: 2023-09-19

## 2023-09-19 RX ORDER — CLOPIDOGREL BISULFATE 75 MG/1
75 TABLET ORAL DAILY
Qty: 90 TABLET | Refills: 3 | Status: SHIPPED | OUTPATIENT
Start: 2023-09-19

## 2023-12-11 ENCOUNTER — TELEPHONE (OUTPATIENT)
Age: 78
End: 2023-12-11

## 2023-12-11 DIAGNOSIS — E78.5 HYPERLIPIDEMIA, UNSPECIFIED HYPERLIPIDEMIA TYPE: ICD-10-CM

## 2023-12-11 DIAGNOSIS — I25.10 CORONARY ARTERY DISEASE INVOLVING NATIVE CORONARY ARTERY OF NATIVE HEART WITHOUT ANGINA PECTORIS: Primary | ICD-10-CM

## 2023-12-12 ENCOUNTER — TELEPHONE (OUTPATIENT)
Age: 78
End: 2023-12-12

## 2023-12-12 DIAGNOSIS — E78.5 HYPERLIPIDEMIA, UNSPECIFIED HYPERLIPIDEMIA TYPE: Primary | ICD-10-CM

## 2023-12-12 NOTE — TELEPHONE ENCOUNTER
Patient called and requested that the repatha auto injector be sent to the D[facility], we sent repatha syringes.   States he has had the auto injector in the past.

## 2023-12-13 ENCOUNTER — OFFICE VISIT (OUTPATIENT)
Age: 78
End: 2023-12-13

## 2023-12-13 VITALS
SYSTOLIC BLOOD PRESSURE: 110 MMHG | WEIGHT: 165 LBS | DIASTOLIC BLOOD PRESSURE: 82 MMHG | BODY MASS INDEX: 23.1 KG/M2 | HEIGHT: 71 IN | OXYGEN SATURATION: 98 % | HEART RATE: 58 BPM

## 2023-12-13 DIAGNOSIS — R55 SYNCOPE AND COLLAPSE: ICD-10-CM

## 2023-12-13 DIAGNOSIS — R00.1 BRADYCARDIA, UNSPECIFIED: Primary | ICD-10-CM

## 2023-12-13 DIAGNOSIS — R06.02 SHORTNESS OF BREATH: ICD-10-CM

## 2023-12-13 DIAGNOSIS — I25.10 ATHEROSCLEROSIS OF NATIVE CORONARY ARTERY WITHOUT ANGINA PECTORIS, UNSPECIFIED WHETHER NATIVE OR TRANSPLANTED HEART: ICD-10-CM

## 2023-12-13 ASSESSMENT — PATIENT HEALTH QUESTIONNAIRE - PHQ9
1. LITTLE INTEREST OR PLEASURE IN DOING THINGS: 0
SUM OF ALL RESPONSES TO PHQ QUESTIONS 1-9: 0
SUM OF ALL RESPONSES TO PHQ9 QUESTIONS 1 & 2: 0
2. FEELING DOWN, DEPRESSED OR HOPELESS: 0
SUM OF ALL RESPONSES TO PHQ QUESTIONS 1-9: 0

## 2023-12-13 NOTE — PROGRESS NOTES
HISTORY OF PRESENT ILLNESS  Amara Payne  77 y.o. male     Chief Complaint   Patient presents with    1 Year Follow Up       ASSESSMENT and PLAN    The primary encounter diagnosis was Bradycardia, unspecified. Diagnoses of Syncope and collapse, Shortness of breath, and Atherosclerosis of native coronary artery without angina pectoris, unspecified whether native or transplanted heart were also pertinent to this visit. Mr. Delmi Reddy has history of CAD. He has never had chest pains or angina equivalent. Back in 6619, he had 64 slice CT scan for unclear reasons. This revealed significant calcification. He subsequently underwent evaluation for CAD despite the fact that he had no symptoms. His evaluation revealed severe LAD disease. He subsequently had LAD stent performed in 2007 and has done fairly well since that time. He remains active physically. Because of recurrent episodes of chest pain, he was readmitted to [facility] in July of 2014; he subsequent underwent repeat coronary angiography which revealed patent LAD stent, without significant, occlusive CAD. Reassurance was given. In May 2017, he presented to the hospital in [place] with abdominal discomfort. He was concerned about possible coronary syndrome. He ruled out and had a stress echocardiogram which was reported to him as normal.  In September 2017, he presented to [facility] emergency room with atypical chest pains. They ruled out acute coronary event and subsequently discharged the patient home. He has had multiple presentations to the emergency room for atypical chest pains over the years. In November 2019, he had exercise nuclear scan as well as echocardiogram.  Nuclear scan showed EF of 69% without any perfusion abnormalities. His echocardiogram shows normal LV function without wall motion abnormality. In December 2022, he had nuclear stress test performed. This showed normal perfusion with EF greater than 70%.     CAD:

## 2023-12-13 NOTE — PROGRESS NOTES
Lauritayesi Yvrose presents today for   Chief Complaint   Patient presents with    1 Year Follow Up     W/ no concerns        Melvin Frances preferred language for health care discussion is english/other. Is someone accompanying this pt? no    Is the patient using any DME equipment during OV? no    Depression Screening:  Depression: Not at risk (12/13/2023)    PHQ-2     PHQ-2 Score: 0        Learning Assessment:  Who is the primary learner? Patient    What is the preferred language for health care of the primary learner? ENGLISH    How does the primary learner prefer to learn new concepts? DEMONSTRATION    Answered By julia    Relationship to Learner SELF           Pt currently taking Anticoagulant therapy? no    Pt currently taking Antiplatelet therapy ? Plavix 75 mg       Coordination of Care:  1. Have you been to the ER, urgent care clinic since your last visit? Hospitalized since your last visit? no    2. Have you seen or consulted any other health care providers outside of the [address] [place] [address] since your last visit? Include any pap smears or colon screening.  no

## 2023-12-14 ENCOUNTER — HOSPITAL ENCOUNTER (OUTPATIENT)
Facility: HOSPITAL | Age: 78
Setting detail: SPECIMEN
Discharge: HOME | End: 2023-12-14
Payer: MEDICARE

## 2023-12-14 DIAGNOSIS — E11.40 CONTROLLED TYPE 2 DIABETES MELLITUS WITH DIABETIC NEUROPATHY, WITHOUT LONG-TERM CURRENT USE OF INSULIN (HCC): ICD-10-CM

## 2023-12-14 DIAGNOSIS — E78.5 HYPERLIPIDEMIA, UNSPECIFIED HYPERLIPIDEMIA TYPE: ICD-10-CM

## 2023-12-14 LAB
ANION GAP SERPL CALC-SCNC: 3 MMOL/L (ref 3–18)
BUN SERPL-MCNC: 16 MG/DL (ref 7–18)
BUN/CREAT SERPL: 22 (ref 12–20)
CALCIUM SERPL-MCNC: 9.1 MG/DL (ref 8.5–10.1)
CHLORIDE SERPL-SCNC: 108 MMOL/L (ref 100–111)
CHOLEST SERPL-MCNC: 114 MG/DL
CO2 SERPL-SCNC: 30 MMOL/L (ref 21–32)
CREAT SERPL-MCNC: 0.74 MG/DL (ref 0.6–1.3)
GLUCOSE SERPL-MCNC: 103 MG/DL (ref 74–99)
HBA1C MFR BLD: 6.1 % (ref 4.2–5.6)
HDLC SERPL-MCNC: 82 MG/DL (ref 40–60)
HDLC SERPL: 1.4 (ref 0–5)
LDLC SERPL CALC-MCNC: 21.2 MG/DL (ref 0–100)
LIPID PANEL: ABNORMAL
POTASSIUM SERPL-SCNC: 4.5 MMOL/L (ref 3.5–5.5)
SODIUM SERPL-SCNC: 141 MMOL/L (ref 136–145)
TRIGL SERPL-MCNC: 54 MG/DL
VLDLC SERPL CALC-MCNC: 10.8 MG/DL

## 2023-12-14 PROCEDURE — 80048 BASIC METABOLIC PNL TOTAL CA: CPT

## 2023-12-14 PROCEDURE — 80061 LIPID PANEL: CPT

## 2023-12-14 PROCEDURE — 83036 HEMOGLOBIN GLYCOSYLATED A1C: CPT

## 2023-12-14 PROCEDURE — 36415 COLL VENOUS BLD VENIPUNCTURE: CPT

## 2024-01-04 ENCOUNTER — TELEPHONE (OUTPATIENT)
Age: 79
End: 2024-01-04

## 2024-01-04 NOTE — TELEPHONE ENCOUNTER
----- Message from Min Figueroa [facility] sent at 1/4/2024  1:31 PM EST -----  I submitted a PA for both Repatha and Praluent.  Both came back with \"Drug is not covered by plan.\"  I know at one point, they were making it so only [facility] cardiologists could prescribe it and it had to be picked up [place] the base.  You could try sending the Repatha directly to [facility] and see if he can fill it on base.  He won't be able to get it from retail it seems.  It doesn't even give me an option to appeal.  It's just simply not covered.  ----- Message -----  From: Delfin Pena MD  Sent: 1/4/2024   7:51 AM EST  To: Min Figueroa [facility]    Min Sewell, happy new year!  With regards to this pt, his insurance is not covering repatha?  He has cad, carotid disease, hlp  Are they covering praluent?    Can you pls help out?  Thx

## 2024-01-04 NOTE — TELEPHONE ENCOUNTER
Pls call pt  Not sure how to get him this med at this point - see Dr Figueroa's note below  Suggest that he speak to the pharmacist at the [facility] and see how they can get him this medication

## 2024-03-01 RX ORDER — METOPROLOL SUCCINATE 25 MG/1
25 TABLET, EXTENDED RELEASE ORAL DAILY
Qty: 90 TABLET | Refills: 3 | Status: SHIPPED | OUTPATIENT
Start: 2024-03-01

## 2024-03-01 RX ORDER — ATORVASTATIN CALCIUM 80 MG/1
80 TABLET, FILM COATED ORAL DAILY
Qty: 90 TABLET | Refills: 3 | Status: SHIPPED | OUTPATIENT
Start: 2024-03-01

## 2024-06-25 ENCOUNTER — HOSPITAL ENCOUNTER (OUTPATIENT)
Facility: HOSPITAL | Age: 79
Setting detail: SPECIMEN
Discharge: HOME | End: 2024-06-28
Payer: MEDICARE

## 2024-06-25 DIAGNOSIS — E11.40 CONTROLLED TYPE 2 DIABETES MELLITUS WITH DIABETIC NEUROPATHY, WITHOUT LONG-TERM CURRENT USE OF INSULIN (HCC): ICD-10-CM

## 2024-06-25 LAB
BASOPHILS # BLD: 0 K/UL (ref 0–0.1)
BASOPHILS NFR BLD: 1 % (ref 0–2)
CHOLEST SERPL-MCNC: 111 MG/DL
CREAT UR-MCNC: 116 MG/DL (ref 30–125)
DIFFERENTIAL METHOD BLD: ABNORMAL
EOSINOPHIL # BLD: 0.1 K/UL (ref 0–0.4)
EOSINOPHIL NFR BLD: 3 % (ref 0–5)
ERYTHROCYTE [DISTWIDTH] IN BLOOD BY AUTOMATED COUNT: 13 % (ref 11.6–14.5)
HBA1C MFR BLD: 6.2 % (ref 4.2–5.6)
HCT VFR BLD AUTO: 39.6 % (ref 36–48)
HDLC SERPL-MCNC: 88 MG/DL (ref 40–60)
HDLC SERPL: 1.3 (ref 0–5)
HGB BLD-MCNC: 12.7 G/DL (ref 13–16)
IMM GRANULOCYTES # BLD AUTO: 0 K/UL (ref 0–0.04)
IMM GRANULOCYTES NFR BLD AUTO: 0 % (ref 0–0.5)
LDLC SERPL CALC-MCNC: 14.4 MG/DL (ref 0–100)
LIPID PANEL: ABNORMAL
LYMPHOCYTES # BLD: 1.1 K/UL (ref 0.9–3.6)
LYMPHOCYTES NFR BLD: 29 % (ref 21–52)
MCH RBC QN AUTO: 31.1 PG (ref 24–34)
MCHC RBC AUTO-ENTMCNC: 32.1 G/DL (ref 31–37)
MCV RBC AUTO: 96.8 FL (ref 78–100)
MICROALBUMIN UR-MCNC: 1.11 MG/DL (ref 0–3)
MICROALBUMIN/CREAT UR-RTO: 10 MG/G (ref 0–30)
MONOCYTES # BLD: 0.3 K/UL (ref 0.05–1.2)
MONOCYTES NFR BLD: 8 % (ref 3–10)
NEUTS SEG # BLD: 2.1 K/UL (ref 1.8–8)
NEUTS SEG NFR BLD: 59 % (ref 40–73)
NRBC # BLD: 0 K/UL (ref 0–0.01)
NRBC BLD-RTO: 0 PER 100 WBC
PLATELET # BLD AUTO: 186 K/UL (ref 135–420)
PMV BLD AUTO: 11.3 FL (ref 9.2–11.8)
RBC # BLD AUTO: 4.09 M/UL (ref 4.35–5.65)
TRIGL SERPL-MCNC: 43 MG/DL
VLDLC SERPL CALC-MCNC: 8.6 MG/DL
WBC # BLD AUTO: 3.6 K/UL (ref 4.6–13.2)

## 2024-06-25 PROCEDURE — 83036 HEMOGLOBIN GLYCOSYLATED A1C: CPT

## 2024-06-25 PROCEDURE — 36415 COLL VENOUS BLD VENIPUNCTURE: CPT

## 2024-06-25 PROCEDURE — 82570 ASSAY OF URINE CREATININE: CPT

## 2024-06-25 PROCEDURE — 85025 COMPLETE CBC W/AUTO DIFF WBC: CPT

## 2024-06-25 PROCEDURE — 82043 UR ALBUMIN QUANTITATIVE: CPT

## 2024-06-25 PROCEDURE — 80061 LIPID PANEL: CPT

## 2024-06-28 ENCOUNTER — OFFICE VISIT (OUTPATIENT)
Facility: CLINIC | Age: 79
End: 2024-06-28

## 2024-06-28 ENCOUNTER — HOSPITAL ENCOUNTER (OUTPATIENT)
Facility: HOSPITAL | Age: 79
Setting detail: SPECIMEN
End: 2024-06-28
Payer: MEDICARE

## 2024-06-28 VITALS
BODY MASS INDEX: 22.26 KG/M2 | WEIGHT: 159 LBS | HEART RATE: 57 BPM | DIASTOLIC BLOOD PRESSURE: 76 MMHG | SYSTOLIC BLOOD PRESSURE: 113 MMHG | OXYGEN SATURATION: 98 % | TEMPERATURE: 97.8 F | HEIGHT: 71 IN | RESPIRATION RATE: 16 BRPM

## 2024-06-28 DIAGNOSIS — E78.5 HYPERLIPIDEMIA, UNSPECIFIED HYPERLIPIDEMIA TYPE: ICD-10-CM

## 2024-06-28 DIAGNOSIS — E11.40 CONTROLLED TYPE 2 DIABETES MELLITUS WITH DIABETIC NEUROPATHY, WITHOUT LONG-TERM CURRENT USE OF INSULIN (HCC): ICD-10-CM

## 2024-06-28 DIAGNOSIS — D64.9 ANEMIA, UNSPECIFIED TYPE: Primary | ICD-10-CM

## 2024-06-28 DIAGNOSIS — K63.5 POLYP OF COLON, UNSPECIFIED PART OF COLON, UNSPECIFIED TYPE: ICD-10-CM

## 2024-06-28 DIAGNOSIS — I25.10 CORONARY ARTERY DISEASE INVOLVING NATIVE CORONARY ARTERY OF NATIVE HEART WITHOUT ANGINA PECTORIS: ICD-10-CM

## 2024-06-28 DIAGNOSIS — D64.9 ANEMIA, UNSPECIFIED TYPE: ICD-10-CM

## 2024-06-28 LAB
FERRITIN SERPL-MCNC: 266 NG/ML (ref 8–388)
FOLATE SERPL-MCNC: 17.8 NG/ML (ref 3.1–17.5)
IRON SATN MFR SERPL: 34 % (ref 20–50)
IRON SERPL-MCNC: 101 UG/DL (ref 50–175)
RETICS/RBC NFR AUTO: 1 % (ref 0.5–2.5)
TIBC SERPL-MCNC: 297 UG/DL (ref 250–450)
VIT B12 SERPL-MCNC: 340 PG/ML (ref 211–911)

## 2024-06-28 PROCEDURE — 83540 ASSAY OF IRON: CPT

## 2024-06-28 PROCEDURE — 83921 ORGANIC ACID SINGLE QUANT: CPT

## 2024-06-28 PROCEDURE — 83550 IRON BINDING TEST: CPT

## 2024-06-28 PROCEDURE — 82607 VITAMIN B-12: CPT

## 2024-06-28 PROCEDURE — 84165 PROTEIN E-PHORESIS SERUM: CPT

## 2024-06-28 PROCEDURE — 82746 ASSAY OF FOLIC ACID SERUM: CPT

## 2024-06-28 PROCEDURE — 84155 ASSAY OF PROTEIN SERUM: CPT

## 2024-06-28 PROCEDURE — 85045 AUTOMATED RETICULOCYTE COUNT: CPT

## 2024-06-28 PROCEDURE — 82728 ASSAY OF FERRITIN: CPT

## 2024-06-28 PROCEDURE — 36415 COLL VENOUS BLD VENIPUNCTURE: CPT

## 2024-06-28 ASSESSMENT — PATIENT HEALTH QUESTIONNAIRE - PHQ9
SUM OF ALL RESPONSES TO PHQ QUESTIONS 1-9: 0
1. LITTLE INTEREST OR PLEASURE IN DOING THINGS: NOT AT ALL
SUM OF ALL RESPONSES TO PHQ QUESTIONS 1-9: 0
2. FEELING DOWN, DEPRESSED OR HOPELESS: NOT AT ALL
SUM OF ALL RESPONSES TO PHQ QUESTIONS 1-9: 0
SUM OF ALL RESPONSES TO PHQ QUESTIONS 1-9: 0
SUM OF ALL RESPONSES TO PHQ9 QUESTIONS 1 & 2: 0

## 2024-06-28 NOTE — PROGRESS NOTES
Ramez Bowles presents today for   Chief Complaint   Patient presents with    6 Month Follow-Up    Diabetes     Patient states right leg started hurting, no injury or trauma. Has been getting a little better but still a 5/10 on the pain scale.      \"Have you been to the ER, urgent care clinic since your last visit?  Hospitalized since your last visit?\"    NO    “Have you seen or consulted any other health care providers outside of [facility] since your last visit?”    NO             
   Lymphocytes Absolute 1.1 0.9 - 3.6 K/UL    Monocytes Absolute 0.3 0.05 - 1.2 K/UL    Eosinophils Absolute 0.1 0.0 - 0.4 K/UL    Basophils Absolute 0.0 0.0 - 0.1 K/UL    Immature Granulocytes Absolute 0.0 0.00 - 0.04 K/UL    Differential Type AUTOMATED     HEMOGLOBIN A1C W/O EAG   Result Value Ref Range    Hemoglobin A1C 6.2 (H) 4.2 - 5.6 %   Lipid Panel   Result Value Ref Range    LIPID PANEL        Cholesterol, Total 111 <200 MG/DL    Triglycerides 43 <150 MG/DL    HDL 88 (H) 40 - 60 MG/DL    LDL Cholesterol 14.4 0 - 100 MG/DL    VLDL Cholesterol Calculated 8.6 MG/DL    Chol/HDL Ratio 1.3 0 - 5.0       We reviewed the patient's labs from the last several visits to point out trends in the numbers        Patient Active Problem List   Diagnosis    Hypovitaminosis D    Peripheral neuropathy    GERD without esophagitis    Advance directive discussed with patient    Coronary artery disease involving native coronary artery without angina pectoris    Colon polyp    Controlled type 2 diabetes mellitus with diabetic neuropathy, without long-term current use of insulin (HCC)    Hyperlipidemia    Arthritis, degenerative    Sinus bradycardia         Assessment and plan:  1.  CHD. F/U Dr. Larios, continue current regimen.    2.  Nephrolithiasis.  Hydration  3.  Neuropathy.  F/U neurology as needed, stable  4.  Hypovit D.  Therapeutic   5.  DM.  Continue current regimen and doing well, f/u Dr Mancini  6.  Dyslipidemia.  Continue statin and repatha  7.  GERD.  Avoidance measures and ppi  8.  Anemia. Long discussion, proceed with eval      RTC 12/24    Above conditions discussed at length and patient vocalized understanding.  All questions answered to patient satisfaction     Diagnosis Orders   1. Anemia, unspecified type  Electrophoresis Protein, Serum    Ferritin    Iron and TIBC    Methylmalonic Acid, Serum    Reticulocytes    Vitamin B12 & Folate    CBC with Auto Differential      2. Coronary artery disease involving native

## 2024-06-28 NOTE — TELEPHONE ENCOUNTER
Refill request      metFORMIN (GLUCOPHAGE) 500 MG tablet       [facility] - [place], [place] - [facility] [address] - P [phone] - F [phone] [[id number]]

## 2024-06-30 ENCOUNTER — TELEPHONE (OUTPATIENT)
Facility: CLINIC | Age: 79
End: 2024-06-30

## 2024-06-30 DIAGNOSIS — D64.9 ANEMIA, UNSPECIFIED TYPE: Primary | ICD-10-CM

## 2024-07-02 LAB
ALBUMIN SERPL ELPH-MCNC: 3.9 G/DL (ref 2.9–4.4)
ALBUMIN/GLOB SERPL: 1.4 (ref 0.7–1.7)
ALPHA1 GLOB SERPL ELPH-MCNC: 0.2 G/DL (ref 0–0.4)
ALPHA2 GLOB SERPL ELPH-MCNC: 0.6 G/DL (ref 0.4–1)
B-GLOBULIN SERPL ELPH-MCNC: 1 G/DL (ref 0.7–1.3)
GAMMA GLOB SERPL ELPH-MCNC: 0.9 G/DL (ref 0.4–1.8)
GLOBULIN SER CALC-MCNC: 2.7 G/DL (ref 2.2–3.9)
M PROTEIN SERPL ELPH-MCNC: NORMAL G/DL
METHYLMALONATE SERPL-SCNC: 251 NMOL/L (ref 0–378)
PROT SERPL-MCNC: 6.6 G/DL (ref 6–8.5)

## 2024-07-11 ENCOUNTER — TELEPHONE (OUTPATIENT)
Facility: CLINIC | Age: 79
End: 2024-07-11

## 2024-07-11 DIAGNOSIS — D64.9 ANEMIA, UNSPECIFIED TYPE: Primary | ICD-10-CM

## 2024-07-11 NOTE — TELEPHONE ENCOUNTER
Pt has been referred to hematology and oncology   Dr Poe pt states he has nt heard from them yet pt ryanes he has called the office a few times they have not returned his call , he is asking if Dr FOUNTAIN can refer him to a different place

## 2024-09-09 ENCOUNTER — TELEPHONE (OUTPATIENT)
Facility: CLINIC | Age: 79
End: 2024-09-09

## 2024-09-09 RX ORDER — CLOPIDOGREL BISULFATE 75 MG/1
75 TABLET ORAL DAILY
Qty: 90 TABLET | Refills: 3 | Status: SHIPPED | OUTPATIENT
Start: 2024-09-09

## 2024-11-19 DIAGNOSIS — E78.5 HYPERLIPIDEMIA, UNSPECIFIED HYPERLIPIDEMIA TYPE: ICD-10-CM

## 2024-12-17 ENCOUNTER — OFFICE VISIT (OUTPATIENT)
Age: 79
End: 2024-12-17
Payer: MEDICARE

## 2024-12-17 VITALS
SYSTOLIC BLOOD PRESSURE: 108 MMHG | DIASTOLIC BLOOD PRESSURE: 72 MMHG | WEIGHT: 162 LBS | OXYGEN SATURATION: 96 % | BODY MASS INDEX: 22.68 KG/M2 | HEART RATE: 59 BPM | HEIGHT: 71 IN

## 2024-12-17 DIAGNOSIS — R00.1 BRADYCARDIA, UNSPECIFIED: Primary | ICD-10-CM

## 2024-12-17 PROCEDURE — 1036F TOBACCO NON-USER: CPT | Performed by: INTERNAL MEDICINE

## 2024-12-17 PROCEDURE — 1126F AMNT PAIN NOTED NONE PRSNT: CPT | Performed by: INTERNAL MEDICINE

## 2024-12-17 PROCEDURE — 93000 ELECTROCARDIOGRAM COMPLETE: CPT | Performed by: INTERNAL MEDICINE

## 2024-12-17 PROCEDURE — 1123F ACP DISCUSS/DSCN MKR DOCD: CPT | Performed by: INTERNAL MEDICINE

## 2024-12-17 PROCEDURE — 99214 OFFICE O/P EST MOD 30 MIN: CPT | Performed by: INTERNAL MEDICINE

## 2024-12-17 PROCEDURE — 1159F MED LIST DOCD IN RCRD: CPT | Performed by: INTERNAL MEDICINE

## 2024-12-17 PROCEDURE — 1160F RVW MEDS BY RX/DR IN RCRD: CPT | Performed by: INTERNAL MEDICINE

## 2024-12-17 PROCEDURE — G8484 FLU IMMUNIZE NO ADMIN: HCPCS | Performed by: INTERNAL MEDICINE

## 2024-12-17 PROCEDURE — G8427 DOCREV CUR MEDS BY ELIG CLIN: HCPCS | Performed by: INTERNAL MEDICINE

## 2024-12-17 PROCEDURE — G8420 CALC BMI NORM PARAMETERS: HCPCS | Performed by: INTERNAL MEDICINE

## 2024-12-17 ASSESSMENT — PATIENT HEALTH QUESTIONNAIRE - PHQ9
SUM OF ALL RESPONSES TO PHQ QUESTIONS 1-9: 0
SUM OF ALL RESPONSES TO PHQ9 QUESTIONS 1 & 2: 0
2. FEELING DOWN, DEPRESSED OR HOPELESS: NOT AT ALL
1. LITTLE INTEREST OR PLEASURE IN DOING THINGS: NOT AT ALL
SUM OF ALL RESPONSES TO PHQ QUESTIONS 1-9: 0

## 2024-12-17 NOTE — PROGRESS NOTES
Ramez Bowles presents today for   Chief Complaint   Patient presents with    Follow-up       Ramez Bowles preferred language for health care discussion is english/other.    Is someone accompanying this pt? no    Is the patient using any DME equipment during OV? no    Depression Screening:  Depression: Not at risk (12/17/2024)    PHQ-2     PHQ-2 Score: 0        Learning Assessment:  Who is the primary learner? Patient    What is the preferred language for health care of the primary learner? ENGLISH    How does the primary learner prefer to learn new concepts? DEMONSTRATION    Answered By patient    Relationship to Learner SELF           Pt currently taking Anticoagulant therapy? no    Pt currently taking Antiplatelet therapy ? plavix      Coordination of Care:  1. Have you been to the ER, urgent care clinic since your last visit? Hospitalized since your last visit? no    2. Have you seen or consulted any other health care providers outside of the [facility] since your last visit? Include any pap smears or colon screening. no

## 2024-12-17 NOTE — PROGRESS NOTES
HISTORY OF PRESENT ILLNESS  Ramez Bowles  79 y.o. male     Chief Complaint   Patient presents with    Follow-up       ASSESSMENT and PLAN    The encounter diagnosis was Bradycardia, unspecified.    Mr. Ramez Bowles has history of CAD. He has never had chest pains or angina equivalent. Back in 2007, he had 64 slice CT scan for unclear reasons. This revealed significant calcification. He subsequently underwent evaluation for CAD despite the fact that he had no symptoms. His evaluation revealed severe LAD disease. He subsequently had LAD stent performed in 2007 and has done fairly well since that time. He remains active physically. Because of recurrent episodes of chest pain, he was readmitted to [facility] in July of 2014; he subsequent underwent repeat coronary angiography which revealed patent LAD stent, without significant, occlusive CAD. Reassurance was given.  In May 2017, he presented to the hospital in [place] with abdominal discomfort.  He was concerned about possible coronary syndrome.  He ruled out and had a stress echocardiogram which was reported to him as normal.  In September 2017, he presented to [facility] emergency room with atypical chest pains.  They ruled out acute coronary event and subsequently discharged the patient home.  He has had multiple presentations to the emergency room for atypical chest pains over the years.  In November 2019, he had exercise nuclear scan as well as echocardiogram.  Nuclear scan showed EF of 69% without any perfusion abnormalities.  His echocardiogram shows normal LV function without wall motion abnormality.   In December 2022, he had nuclear stress test performed.  This showed normal perfusion with EF greater than 70%.    Medication regimen reviewed and current cardiac regimen will be continued.  All new testing since the last office visit was independently reviewed by me.    CAD:    Clinically stable.  HTN:    Well-controlled at 108/72.  Continue

## 2024-12-18 ENCOUNTER — HOSPITAL ENCOUNTER (OUTPATIENT)
Facility: HOSPITAL | Age: 79
Setting detail: SPECIMEN
Discharge: HOME | End: 2024-12-21
Payer: MEDICARE

## 2024-12-18 DIAGNOSIS — D64.9 ANEMIA, UNSPECIFIED TYPE: ICD-10-CM

## 2024-12-18 DIAGNOSIS — E11.40 CONTROLLED TYPE 2 DIABETES MELLITUS WITH DIABETIC NEUROPATHY, WITHOUT LONG-TERM CURRENT USE OF INSULIN (HCC): ICD-10-CM

## 2024-12-18 LAB
ANION GAP SERPL CALC-SCNC: 3 MMOL/L (ref 3–18)
BASOPHILS # BLD: 0 K/UL (ref 0–0.1)
BASOPHILS NFR BLD: 1 % (ref 0–2)
BUN SERPL-MCNC: 16 MG/DL (ref 7–18)
BUN/CREAT SERPL: 21 (ref 12–20)
CALCIUM SERPL-MCNC: 8.9 MG/DL (ref 8.5–10.1)
CHLORIDE SERPL-SCNC: 106 MMOL/L (ref 100–111)
CO2 SERPL-SCNC: 30 MMOL/L (ref 21–32)
CREAT SERPL-MCNC: 0.75 MG/DL (ref 0.6–1.3)
DIFFERENTIAL METHOD BLD: ABNORMAL
EOSINOPHIL # BLD: 0.1 K/UL (ref 0–0.4)
EOSINOPHIL NFR BLD: 3 % (ref 0–5)
ERYTHROCYTE [DISTWIDTH] IN BLOOD BY AUTOMATED COUNT: 12.9 % (ref 11.6–14.5)
GLUCOSE SERPL-MCNC: 104 MG/DL (ref 74–99)
HBA1C MFR BLD: 6.4 % (ref 4.2–5.6)
HCT VFR BLD AUTO: 40.9 % (ref 36–48)
HGB BLD-MCNC: 13 G/DL (ref 13–16)
IMM GRANULOCYTES # BLD AUTO: 0 K/UL (ref 0–0.04)
IMM GRANULOCYTES NFR BLD AUTO: 0 % (ref 0–0.5)
LYMPHOCYTES # BLD: 1.2 K/UL (ref 0.9–3.6)
LYMPHOCYTES NFR BLD: 33 % (ref 21–52)
MCH RBC QN AUTO: 30.9 PG (ref 24–34)
MCHC RBC AUTO-ENTMCNC: 31.8 G/DL (ref 31–37)
MCV RBC AUTO: 97.1 FL (ref 78–100)
MONOCYTES # BLD: 0.4 K/UL (ref 0.05–1.2)
MONOCYTES NFR BLD: 10 % (ref 3–10)
NEUTS SEG # BLD: 2.1 K/UL (ref 1.8–8)
NEUTS SEG NFR BLD: 54 % (ref 40–73)
NRBC # BLD: 0 K/UL (ref 0–0.01)
NRBC BLD-RTO: 0 PER 100 WBC
PLATELET # BLD AUTO: 242 K/UL (ref 135–420)
PMV BLD AUTO: 11.1 FL (ref 9.2–11.8)
POTASSIUM SERPL-SCNC: 4.4 MMOL/L (ref 3.5–5.5)
RBC # BLD AUTO: 4.21 M/UL (ref 4.35–5.65)
SODIUM SERPL-SCNC: 139 MMOL/L (ref 136–145)
WBC # BLD AUTO: 3.8 K/UL (ref 4.6–13.2)

## 2024-12-18 PROCEDURE — 80048 BASIC METABOLIC PNL TOTAL CA: CPT

## 2024-12-18 PROCEDURE — 85025 COMPLETE CBC W/AUTO DIFF WBC: CPT

## 2024-12-18 PROCEDURE — 83036 HEMOGLOBIN GLYCOSYLATED A1C: CPT

## 2024-12-18 PROCEDURE — 36415 COLL VENOUS BLD VENIPUNCTURE: CPT

## 2024-12-27 NOTE — PROGRESS NOTES
79 y.o. male who presents for evaluation.    [facility]    Reports that [facility] will be [facility] [place] [facility] this [facility]    No cardiovascular complaints and he continues to walk 2mi about 5x/week. He saw Dr Larios in Dec and was cleared for the surgery    He had CAP in Nov but did well w abx    No polyuria, polydipsia, nocturia, vision change. FBS  controlled    No gi or gu issues.  He has nocturia 1-2x nightly but not wanting anything done.  He is interested in further colon ca screening but will [facility] to [facility]    The neuropathy sx are about the same limited to the feet and no progression over the years    He saw Dr Poe for the mild anemia.  They are considering bm bx at the next visit in spring    *LAST MEDICARE WELLNESS EXAM: 3/28/14, 4/10/15, 4/13/16, 10/2/17, 10/17/18, 10/28/19, 12/29/20, 12/21/21, 12/5/22, 12/19/23, 1/3/25    Past Medical History:   Diagnosis Date    Anemia 09/2024    Dr Poe - CCUS vs early mds    Atypical chest pain     2007, 2015    CAD (coronary artery disease) 2007    95% LAD stented to 0%     Carotid artery disease (HCC)     BICA<50% (2008);  BICA<50% (4/16)    Colon polyps 2015    Dr Joyner [facility]    COVID-19 vaccine dose declined 12/2023    boosters    COVID-19 virus infection 05/2022    DM (diabetes mellitus) (formerly [facility]) 01/2015    on basis of hba1c    Dyslipidemia     GERD (gastroesophageal reflux disease) 10/2017    Gilbert's syndrome     Gout 2002    H/O cardiac catheterization 07/31/2014    LAD diffuse 20-30%.  Prior pLAD stent patent.  Otherwise < 20% coronary artery disease.  LVEDP 10 mmHg.  EF 55%.  Minimal anteroapical hypk.      H/O cardiovascular stress test     NST negative, ef 63% (12/13); stress echo neg, ef 57% [facility] (5/17); NST neg, ef 57% [facility] (9/17); NST low risk, ef 69%, TID 1 (11/19)    H/O echocardiogram     nl lv, ef 60%, mild MR, mild TR (12/13); nl lv, ef 56%, no wma, mild SÁNCHEZ, pap 25 (11/19)

## 2025-01-03 ENCOUNTER — OFFICE VISIT (OUTPATIENT)
Facility: CLINIC | Age: 80
End: 2025-01-03

## 2025-01-03 VITALS
OXYGEN SATURATION: 96 % | DIASTOLIC BLOOD PRESSURE: 74 MMHG | HEIGHT: 71 IN | RESPIRATION RATE: 18 BRPM | WEIGHT: 162 LBS | HEART RATE: 77 BPM | BODY MASS INDEX: 22.68 KG/M2 | TEMPERATURE: 98.3 F | SYSTOLIC BLOOD PRESSURE: 112 MMHG

## 2025-01-03 DIAGNOSIS — K63.5 POLYP OF COLON, UNSPECIFIED PART OF COLON, UNSPECIFIED TYPE: ICD-10-CM

## 2025-01-03 DIAGNOSIS — Z71.89 ADVANCED CARE PLANNING/COUNSELING DISCUSSION: ICD-10-CM

## 2025-01-03 DIAGNOSIS — I25.10 CORONARY ARTERY DISEASE INVOLVING NATIVE CORONARY ARTERY OF NATIVE HEART WITHOUT ANGINA PECTORIS: ICD-10-CM

## 2025-01-03 DIAGNOSIS — G62.9 PERIPHERAL POLYNEUROPATHY: ICD-10-CM

## 2025-01-03 DIAGNOSIS — E78.5 HYPERLIPIDEMIA, UNSPECIFIED HYPERLIPIDEMIA TYPE: ICD-10-CM

## 2025-01-03 DIAGNOSIS — Z00.00 MEDICARE ANNUAL WELLNESS VISIT, SUBSEQUENT: Primary | ICD-10-CM

## 2025-01-03 DIAGNOSIS — E11.40 CONTROLLED TYPE 2 DIABETES MELLITUS WITH DIABETIC NEUROPATHY, WITHOUT LONG-TERM CURRENT USE OF INSULIN (HCC): ICD-10-CM

## 2025-01-03 LAB
HEMOCCULT STL QL: NEGATIVE
VALID INTERNAL CONTROL: YES

## 2025-01-03 ASSESSMENT — PATIENT HEALTH QUESTIONNAIRE - PHQ9
SUM OF ALL RESPONSES TO PHQ QUESTIONS 1-9: 0
SUM OF ALL RESPONSES TO PHQ9 QUESTIONS 1 & 2: 0
SUM OF ALL RESPONSES TO PHQ QUESTIONS 1-9: 0
2. FEELING DOWN, DEPRESSED OR HOPELESS: NOT AT ALL
SUM OF ALL RESPONSES TO PHQ QUESTIONS 1-9: 0
1. LITTLE INTEREST OR PLEASURE IN DOING THINGS: NOT AT ALL
SUM OF ALL RESPONSES TO PHQ QUESTIONS 1-9: 0

## 2025-01-03 ASSESSMENT — LIFESTYLE VARIABLES
HOW OFTEN DO YOU HAVE A DRINK CONTAINING ALCOHOL: NEVER
HOW MANY STANDARD DRINKS CONTAINING ALCOHOL DO YOU HAVE ON A TYPICAL DAY: PATIENT DOES NOT DRINK

## 2025-01-03 NOTE — PROGRESS NOTES
Medicare Annual Wellness Visit    Ramez Bowles is here for Medicare AWV    Assessment & Plan   Advanced care planning/counseling discussion  Coronary artery disease involving native coronary artery of native heart without angina pectoris  Controlled type 2 diabetes mellitus with diabetic neuropathy, without long-term current use of insulin (HCC)  -     Comprehensive Metabolic Panel; Future  -     HEMOGLOBIN A1C W/O EAG; Future  Peripheral polyneuropathy  Hyperlipidemia, unspecified hyperlipidemia type  -     Lipid Panel; Future  Polyp of colon, unspecified part of colon, unspecified type  -     AMB POC FECAL BLOOD, OCCULT, QL 1 CARD  Medicare annual wellness visit, subsequent       Return in 6 months (on 7/3/2025).     Subjective       Patient's complete Health Risk Assessment and screening values have been reviewed and are found in Flowsheets. The following problems were reviewed today and where indicated follow up appointments were made and/or referrals ordered.    No Positive Risk Factors identified today.                                    Objective   Vitals:    01/03/25 0832   BP: 112/74   Pulse: 77   Resp: 18   Temp: 98.3 °F (36.8 °C)   TempSrc: Temporal   SpO2: 96%   Weight: 73.5 kg (162 lb)   Height: 1.803 m (5' 11\")      Body mass index is 22.59 kg/m².                  No Known Allergies  Prior to Visit Medications    Medication Sig Taking? Authorizing Provider   Evolocumab 140 MG/ML SOAJ Inject 140 mg into the skin every 14 days Yes Delfin Pena MD   clopidogrel (PLAVIX) 75 MG tablet Take 1 tablet by mouth daily Yes Delfin Pena MD   CINNAMON PO Take 1 capsule by mouth 2 times daily Yes Provider, MD Soraida   metFORMIN (GLUCOPHAGE) 500 MG tablet Take 1 tablet by mouth 2 times daily (with meals) Yes Delfin Pena MD   metoprolol succinate (TOPROL XL) 25 MG extended release tablet Take 1 tablet by mouth daily Yes Delfin Pena MD   atorvastatin (LIPITOR) 80 MG tablet Take 1

## 2025-01-03 NOTE — ACP (ADVANCE CARE PLANNING)
Advance Care Planning     Advance Care Planning (ACP) Physician/NP/PA Conversation    Date of Conversation: 1/3/2025  Conducted with: Patient with Decision Making Capacity    Healthcare Decision Maker:      Primary Decision Maker: Priscilla Bowles - Spouse - [phone]    Click here to complete Healthcare Decision Makers including selection of the Healthcare Decision Maker Relationship (ie \"Primary\")  Today we documented Decision Maker(s) consistent with Legal Next of Kin hierarchy.    Care Preferences:    Hospitalization:  \"If your health worsens and it becomes clear that your chance of recovery is unlikely, what would be your preference regarding hospitalization?\"  The patient would prefer hospitalization.    Ventilation:  \"If you were unable to breath on your own and your chance of recovery was unlikely, what would be your preference about the use of a ventilator (breathing machine) if it was available to you?\"  The patient would desire the use of a ventilator.    Resuscitation:  \"In the event your heart stopped as a result of an underlying serious health condition, would you want attempts made to restart your heart, or would you prefer a natural death?\"  Yes, attempt to resuscitate.    ventilation preferences, hospitalization preferences, and resuscitation preferences    Conversation Outcomes / Follow-Up Plan:  ACP in process - information provided, considering goals and options  Reviewed DNR/DNI and patient elects Full Code (Attempt Resuscitation)    Length of Voluntary ACP Conversation in minutes:  16 minutes    ALEJANDRO CAMPOS MD

## 2025-01-03 NOTE — PROGRESS NOTES
Ramez Bowles presents today for   Chief Complaint   Patient presents with    Medicare AWV       \"Have you been to the ER, urgent care clinic since your last visit?  Hospitalized since your last visit?\"    YES - When: approximately 1 months ago.  Where and Why: [facility], pneumonia.    “Have you seen or consulted any other health care providers outside of [facility] since your last visit?”    YES - When: approximately 4 days ago.  [facility]

## 2025-04-14 DIAGNOSIS — E78.5 HYPERLIPIDEMIA, UNSPECIFIED HYPERLIPIDEMIA TYPE: Primary | ICD-10-CM

## 2025-04-14 DIAGNOSIS — I25.10 CORONARY ARTERY DISEASE INVOLVING NATIVE CORONARY ARTERY OF NATIVE HEART WITHOUT ANGINA PECTORIS: ICD-10-CM

## 2025-04-14 NOTE — TELEPHONE ENCOUNTER
Refill request via phone     Medication:   atorvastatin (LIPITOR) 80 MG tablet   Quantity: 90  Pharmacy: [place] [id number] [facility]   Last Fill: 3/1/2024    Medication: metoprolol succinate (TOPROL XL) 25 MG extended release tablet   Quantity: 90  Pharmacy: [place] [id number] [address] [facility]   Last Fill: 3/1/2024    PCP: Delfin Pena MD    LAST OFFICE VISIT: 1/3/2025       Future Appointments   Date Time Provider Department Center   7/2/2025  8:00 AM [facility] LAB VISIT [facility] DEP   7/9/2025  8:00 AM Delfin Pena MD [facility] DEP   12/17/2025  8:00 AM Everardo Larios MD [facility] AMB

## 2025-04-15 RX ORDER — METOPROLOL SUCCINATE 25 MG/1
25 TABLET, EXTENDED RELEASE ORAL DAILY
Qty: 90 TABLET | Refills: 3 | Status: SHIPPED | OUTPATIENT
Start: 2025-04-15

## 2025-04-15 RX ORDER — ATORVASTATIN CALCIUM 80 MG/1
80 TABLET, FILM COATED ORAL DAILY
Qty: 90 TABLET | Refills: 3 | Status: SHIPPED | OUTPATIENT
Start: 2025-04-15

## 2025-06-29 NOTE — PROGRESS NOTES
80 y.o. male who presents for evaluation.    [facility]  He had right shoulder replacement in 1/25    No cardiovascular complaints and he continues to walk 2mi about 5x/week. Continues to see Dr Larios    No polyuria, polydipsia, nocturia, vision change. FBS  controlled    No gi or gu issues.  He has nocturia 1-2x nightly but not wanting anything done.  He is going to sched [facility] colo with [facility] as [facility]    The neuropathy sx are about the same limited to the feet and no progression over the years    He saw Dr Poe for the mild anemia.  They are trending but have talked about possible bm bx in the future    *LAST MEDICARE WELLNESS EXAM: 1/3/25    Past Medical History:   Diagnosis Date    Anemia 09/2024    Dr Poe - CCUS vs early mds    Atypical chest pain     2007, 2015    CAD (coronary artery disease) 2007    95% LAD stented to 0%     Carotid artery disease     BICA<50% (2008);  BICA<50% (4/16)    Colon polyps 2015    Dr Joyner [facility]    COVID-19 vaccine dose declined 12/2023    boosters    COVID-19 virus infection 05/2022    DM (diabetes mellitus) ([facility]) 01/2015    on basis of hba1c    Dyslipidemia     Encounter for screening for vascular disease     neg comm screening for PAD/AAA (2006)    GERD (gastroesophageal reflux disease) 10/2017    Gilbert's syndrome     Gout 2002    H/O cardiac catheterization 07/31/2014    LAD diffuse 20-30%.  Prior pLAD stent patent.  Otherwise < 20% coronary artery disease.  LVEDP 10 mmHg.  EF 55%.  Minimal anteroapical hypk.      H/O cardiovascular stress test     NST negative, ef 63% (12/13); stress echo neg, ef 57% [facility] (5/17); NST neg, ef 57% [facility] (9/17); NST low risk, ef 69%, TID 1 (11/19)    H/O echocardiogram     nl lv, ef 60%, mild MR, mild TR (12/13); nl lv, ef 56%, no wma, mild SÁNCHEZ, pap 25 (11/19)    Lung nodule 09/2017    on CT; questionable 8mm RUL, neg aneurysm; subsequent CT 3/18 showed no nodule, just atelectasis    Nephrolithiasis     ureteral stone

## 2025-07-02 ENCOUNTER — HOSPITAL ENCOUNTER (OUTPATIENT)
Facility: HOSPITAL | Age: 80
Setting detail: SPECIMEN
Discharge: HOME | End: 2025-07-05
Payer: MEDICARE

## 2025-07-02 DIAGNOSIS — E78.5 HYPERLIPIDEMIA, UNSPECIFIED HYPERLIPIDEMIA TYPE: ICD-10-CM

## 2025-07-02 DIAGNOSIS — E11.40 CONTROLLED TYPE 2 DIABETES MELLITUS WITH DIABETIC NEUROPATHY, WITHOUT LONG-TERM CURRENT USE OF INSULIN (HCC): ICD-10-CM

## 2025-07-02 LAB
ALBUMIN SERPL-MCNC: 3.2 G/DL (ref 3.4–5)
ALBUMIN/GLOB SERPL: 1.2 (ref 0.8–1.7)
ALP SERPL-CCNC: 71 U/L (ref 45–117)
ALT SERPL-CCNC: 23 U/L (ref 10–50)
ANION GAP SERPL CALC-SCNC: 8 MMOL/L (ref 3–18)
AST SERPL-CCNC: 35 U/L (ref 10–38)
BILIRUB SERPL-MCNC: 0.8 MG/DL (ref 0.2–1)
BUN SERPL-MCNC: 17 MG/DL (ref 6–23)
BUN/CREAT SERPL: 21 (ref 12–20)
CALCIUM SERPL-MCNC: 9.2 MG/DL (ref 8.5–10.1)
CHLORIDE SERPL-SCNC: 105 MMOL/L (ref 98–107)
CHOLEST SERPL-MCNC: 105 MG/DL
CO2 SERPL-SCNC: 26 MMOL/L (ref 21–32)
CREAT SERPL-MCNC: 0.78 MG/DL (ref 0.6–1.3)
GLOBULIN SER CALC-MCNC: 2.8 G/DL (ref 2–4)
GLUCOSE SERPL-MCNC: 106 MG/DL (ref 74–108)
HBA1C MFR BLD: 6.4 % (ref 4.2–5.6)
HDLC SERPL-MCNC: 71 MG/DL (ref 40–60)
HDLC SERPL: 1.5 (ref 0–5)
LDLC SERPL CALC-MCNC: 24 MG/DL (ref 0–100)
POTASSIUM SERPL-SCNC: 4.3 MMOL/L (ref 3.5–5.5)
PROT SERPL-MCNC: 6 G/DL (ref 6.4–8.2)
SODIUM SERPL-SCNC: 139 MMOL/L (ref 136–145)
TRIGL SERPL-MCNC: 53 MG/DL (ref 0–150)
VLDLC SERPL CALC-MCNC: 11 MG/DL

## 2025-07-02 PROCEDURE — 80053 COMPREHEN METABOLIC PANEL: CPT

## 2025-07-02 PROCEDURE — 36415 COLL VENOUS BLD VENIPUNCTURE: CPT

## 2025-07-02 PROCEDURE — 80061 LIPID PANEL: CPT

## 2025-07-02 PROCEDURE — 83036 HEMOGLOBIN GLYCOSYLATED A1C: CPT

## 2025-07-09 ENCOUNTER — HOSPITAL ENCOUNTER (OUTPATIENT)
Facility: HOSPITAL | Age: 80
Setting detail: SPECIMEN
Discharge: HOME | End: 2025-07-12
Payer: MEDICARE

## 2025-07-09 ENCOUNTER — OFFICE VISIT (OUTPATIENT)
Facility: CLINIC | Age: 80
End: 2025-07-09

## 2025-07-09 VITALS
BODY MASS INDEX: 22.12 KG/M2 | OXYGEN SATURATION: 99 % | TEMPERATURE: 98.2 F | WEIGHT: 158 LBS | RESPIRATION RATE: 16 BRPM | HEIGHT: 71 IN | DIASTOLIC BLOOD PRESSURE: 69 MMHG | HEART RATE: 63 BPM | SYSTOLIC BLOOD PRESSURE: 106 MMHG

## 2025-07-09 DIAGNOSIS — E11.40 CONTROLLED TYPE 2 DIABETES MELLITUS WITH DIABETIC NEUROPATHY, WITHOUT LONG-TERM CURRENT USE OF INSULIN (HCC): ICD-10-CM

## 2025-07-09 DIAGNOSIS — K21.9 GERD WITHOUT ESOPHAGITIS: ICD-10-CM

## 2025-07-09 DIAGNOSIS — K63.5 POLYP OF COLON, UNSPECIFIED PART OF COLON, UNSPECIFIED TYPE: ICD-10-CM

## 2025-07-09 DIAGNOSIS — E78.5 HYPERLIPIDEMIA, UNSPECIFIED HYPERLIPIDEMIA TYPE: ICD-10-CM

## 2025-07-09 DIAGNOSIS — I25.10 CORONARY ARTERY DISEASE INVOLVING NATIVE CORONARY ARTERY OF NATIVE HEART WITHOUT ANGINA PECTORIS: Primary | ICD-10-CM

## 2025-07-09 DIAGNOSIS — G62.9 PERIPHERAL POLYNEUROPATHY: ICD-10-CM

## 2025-07-09 LAB
CREAT UR-MCNC: 110 MG/DL (ref 30–125)
MICROALBUMIN UR-MCNC: <1.2 MG/DL (ref 0–3)
MICROALBUMIN/CREAT UR-RTO: NORMAL MG/G (ref 0–30)

## 2025-07-09 PROCEDURE — 82570 ASSAY OF URINE CREATININE: CPT

## 2025-07-09 PROCEDURE — 82043 UR ALBUMIN QUANTITATIVE: CPT

## 2025-07-09 RX ORDER — METFORMIN HYDROCHLORIDE 500 MG/1
500 TABLET, EXTENDED RELEASE ORAL
Qty: 90 TABLET | Refills: 3 | Status: SHIPPED | OUTPATIENT
Start: 2025-07-09 | End: 2025-07-11 | Stop reason: SDUPTHER

## 2025-07-09 RX ORDER — ATORVASTATIN CALCIUM 40 MG/1
40 TABLET, FILM COATED ORAL DAILY
Qty: 90 TABLET | Refills: 3
Start: 2025-07-09

## 2025-07-09 SDOH — ECONOMIC STABILITY: FOOD INSECURITY: WITHIN THE PAST 12 MONTHS, THE FOOD YOU BOUGHT JUST DIDN'T LAST AND YOU DIDN'T HAVE MONEY TO GET MORE.: NEVER TRUE

## 2025-07-09 SDOH — ECONOMIC STABILITY: FOOD INSECURITY: WITHIN THE PAST 12 MONTHS, YOU WORRIED THAT YOUR FOOD WOULD RUN OUT BEFORE YOU GOT MONEY TO BUY MORE.: NEVER TRUE

## 2025-07-09 NOTE — PROGRESS NOTES
Ramez Bowles presents today for   Chief Complaint   Patient presents with    6 Month Follow-Up    Diabetes       \"Have you been to the ER, urgent care clinic since your last visit?  Hospitalized since your last visit?\"    NO    “Have you seen or consulted any other health care providers outside of [facility] since your last visit?”    YES - When: approximately 6 months ago.  Where and Why: [facility]: [facility].

## 2025-07-11 ENCOUNTER — TELEPHONE (OUTPATIENT)
Facility: CLINIC | Age: 80
End: 2025-07-11

## 2025-07-11 DIAGNOSIS — E11.40 CONTROLLED TYPE 2 DIABETES MELLITUS WITH DIABETIC NEUROPATHY, WITHOUT LONG-TERM CURRENT USE OF INSULIN (HCC): ICD-10-CM

## 2025-07-11 RX ORDER — METFORMIN HYDROCHLORIDE 500 MG/1
500 TABLET, EXTENDED RELEASE ORAL
Qty: 90 TABLET | Refills: 3 | Status: SHIPPED | OUTPATIENT
Start: 2025-07-11

## 2025-07-11 NOTE — TELEPHONE ENCOUNTER
Pt called stating that he didn't receive his metFORMIN (GLUCOPHAGE-XR) 500 MG extended release tablet  because the system was down pt requested that a new script to be sent to pharmacy.    [facility] - [place] - [facility] [address] - P [phone] - F [phone] [[id number]]       Please advise

## 2025-08-19 DIAGNOSIS — E78.5 HYPERLIPIDEMIA, UNSPECIFIED HYPERLIPIDEMIA TYPE: ICD-10-CM

## 2025-08-19 RX ORDER — ATORVASTATIN CALCIUM 40 MG/1
40 TABLET, FILM COATED ORAL DAILY
Qty: 90 TABLET | Refills: 3 | Status: SHIPPED | OUTPATIENT
Start: 2025-08-19

## 2025-08-20 ENCOUNTER — TELEPHONE (OUTPATIENT)
Facility: CLINIC | Age: 80
End: 2025-08-20

## 2025-08-20 DIAGNOSIS — E78.5 HYPERLIPIDEMIA, UNSPECIFIED HYPERLIPIDEMIA TYPE: ICD-10-CM

## 2025-09-02 DIAGNOSIS — I25.10 CORONARY ARTERY DISEASE INVOLVING NATIVE CORONARY ARTERY OF NATIVE HEART WITHOUT ANGINA PECTORIS: Primary | ICD-10-CM

## 2025-09-02 RX ORDER — CLOPIDOGREL BISULFATE 75 MG/1
75 TABLET ORAL DAILY
Qty: 90 TABLET | Refills: 3 | Status: SHIPPED | OUTPATIENT
Start: 2025-09-02